# Patient Record
Sex: FEMALE | Race: WHITE
[De-identification: names, ages, dates, MRNs, and addresses within clinical notes are randomized per-mention and may not be internally consistent; named-entity substitution may affect disease eponyms.]

---

## 2018-12-10 ENCOUNTER — HOSPITAL ENCOUNTER (EMERGENCY)
Dept: HOSPITAL 84 - D.ER | Age: 83
Discharge: HOME | End: 2018-12-10
Payer: MEDICARE

## 2018-12-10 DIAGNOSIS — I10: ICD-10-CM

## 2018-12-10 DIAGNOSIS — E11.9: ICD-10-CM

## 2018-12-10 DIAGNOSIS — Z95.0: ICD-10-CM

## 2018-12-10 DIAGNOSIS — M54.5: Primary | ICD-10-CM

## 2018-12-10 DIAGNOSIS — I48.2: ICD-10-CM

## 2018-12-10 DIAGNOSIS — Z79.01: ICD-10-CM

## 2018-12-10 LAB
INR PPP: 1.74 (ref 0.85–1.17)
PROTHROMBIN TIME: 19.8 SECONDS (ref 11.6–15)

## 2018-12-13 ENCOUNTER — HOSPITAL ENCOUNTER (INPATIENT)
Dept: HOSPITAL 84 - D.ER | Age: 83
LOS: 6 days | Discharge: NURSING, REHAB OR LONG-TERM CARE | DRG: 543 | End: 2018-12-19
Attending: FAMILY MEDICINE | Admitting: FAMILY MEDICINE
Payer: MEDICARE

## 2018-12-13 VITALS
HEIGHT: 65 IN | WEIGHT: 126 LBS | HEIGHT: 65 IN | BODY MASS INDEX: 20.99 KG/M2 | BODY MASS INDEX: 20.99 KG/M2 | WEIGHT: 126 LBS | BODY MASS INDEX: 20.99 KG/M2 | BODY MASS INDEX: 20.99 KG/M2

## 2018-12-13 DIAGNOSIS — E11.9: ICD-10-CM

## 2018-12-13 DIAGNOSIS — Z86.73: ICD-10-CM

## 2018-12-13 DIAGNOSIS — I48.91: ICD-10-CM

## 2018-12-13 DIAGNOSIS — E03.9: ICD-10-CM

## 2018-12-13 DIAGNOSIS — D75.89: ICD-10-CM
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
## 2018-12-13 DIAGNOSIS — Z79.01: ICD-10-CM

## 2018-12-13 DIAGNOSIS — N17.9: ICD-10-CM

## 2018-12-13 DIAGNOSIS — I10: ICD-10-CM

## 2018-12-13 DIAGNOSIS — M48.54XA: Primary | ICD-10-CM

## 2018-12-13 DIAGNOSIS — M48.56XA: ICD-10-CM

## 2018-12-13 DIAGNOSIS — E87.5: ICD-10-CM

## 2018-12-14 VITALS — SYSTOLIC BLOOD PRESSURE: 123 MMHG | DIASTOLIC BLOOD PRESSURE: 64 MMHG

## 2018-12-14 VITALS — SYSTOLIC BLOOD PRESSURE: 131 MMHG | DIASTOLIC BLOOD PRESSURE: 41 MMHG

## 2018-12-14 VITALS — DIASTOLIC BLOOD PRESSURE: 65 MMHG | SYSTOLIC BLOOD PRESSURE: 128 MMHG

## 2018-12-14 VITALS — SYSTOLIC BLOOD PRESSURE: 122 MMHG | DIASTOLIC BLOOD PRESSURE: 62 MMHG

## 2018-12-14 VITALS — SYSTOLIC BLOOD PRESSURE: 130 MMHG | DIASTOLIC BLOOD PRESSURE: 61 MMHG

## 2018-12-14 LAB
ALBUMIN SERPL-MCNC: 2.4 G/DL (ref 3.4–5)
ALBUMIN SERPL-MCNC: 2.5 G/DL (ref 3.4–5)
ALP SERPL-CCNC: 216 U/L (ref 46–116)
ALP SERPL-CCNC: 242 U/L (ref 46–116)
ALT SERPL-CCNC: 108 U/L (ref 10–68)
ALT SERPL-CCNC: 96 U/L (ref 10–68)
ANION GAP SERPL CALC-SCNC: 13.8 MMOL/L (ref 8–16)
ANION GAP SERPL CALC-SCNC: 17.9 MMOL/L (ref 8–16)
APTT BLD: 37.8 SECONDS (ref 22.8–39.4)
BASOPHILS NFR BLD AUTO: 0.2 % (ref 0–2)
BASOPHILS NFR BLD AUTO: 0.3 % (ref 0–2)
BILIRUB SERPL-MCNC: 0.88 MG/DL (ref 0.2–1.3)
BILIRUB SERPL-MCNC: 0.95 MG/DL (ref 0.2–1.3)
BUN SERPL-MCNC: 79 MG/DL (ref 7–18)
BUN SERPL-MCNC: 87 MG/DL (ref 7–18)
CALCIUM SERPL-MCNC: 8 MG/DL (ref 8.5–10.1)
CALCIUM SERPL-MCNC: 8.6 MG/DL (ref 8.5–10.1)
CHLORIDE SERPL-SCNC: 103 MMOL/L (ref 98–107)
CHLORIDE SERPL-SCNC: 104 MMOL/L (ref 98–107)
CO2 SERPL-SCNC: 25.4 MMOL/L (ref 21–32)
CO2 SERPL-SCNC: 27.1 MMOL/L (ref 21–32)
CREAT SERPL-MCNC: 2.6 MG/DL (ref 0.6–1.3)
CREAT SERPL-MCNC: 2.7 MG/DL (ref 0.6–1.3)
EOSINOPHIL NFR BLD: 0.5 % (ref 0–7)
EOSINOPHIL NFR BLD: 0.6 % (ref 0–7)
ERYTHROCYTE [DISTWIDTH] IN BLOOD BY AUTOMATED COUNT: 15.3 % (ref 11.5–14.5)
ERYTHROCYTE [DISTWIDTH] IN BLOOD BY AUTOMATED COUNT: 15.5 % (ref 11.5–14.5)
GLOBULIN SER-MCNC: 3.7 G/L
GLOBULIN SER-MCNC: 3.9 G/L
GLUCOSE SERPL-MCNC: 117 MG/DL (ref 74–106)
GLUCOSE SERPL-MCNC: 93 MG/DL (ref 74–106)
HCT VFR BLD CALC: 34.8 % (ref 36–48)
HCT VFR BLD CALC: 34.9 % (ref 36–48)
HGB BLD-MCNC: 10.9 G/DL (ref 12–16)
HGB BLD-MCNC: 11 G/DL (ref 12–16)
IMM GRANULOCYTES NFR BLD: 0.3 % (ref 0–5)
IMM GRANULOCYTES NFR BLD: 0.3 % (ref 0–5)
INR PPP: 2.05 (ref 0.85–1.17)
LYMPHOCYTES NFR BLD AUTO: 10.6 % (ref 15–50)
LYMPHOCYTES NFR BLD AUTO: 8.6 % (ref 15–50)
MAGNESIUM SERPL-MCNC: 2.6 MG/DL (ref 1.8–2.4)
MCH RBC QN AUTO: 31.6 PG (ref 26–34)
MCH RBC QN AUTO: 31.7 PG (ref 26–34)
MCHC RBC AUTO-ENTMCNC: 31.3 G/DL (ref 31–37)
MCHC RBC AUTO-ENTMCNC: 31.5 G/DL (ref 31–37)
MCV RBC: 100.3 FL (ref 80–100)
MCV RBC: 101.2 FL (ref 80–100)
MONOCYTES NFR BLD: 14.7 % (ref 2–11)
MONOCYTES NFR BLD: 16.9 % (ref 2–11)
NEUTROPHILS NFR BLD AUTO: 71.4 % (ref 40–80)
NEUTROPHILS NFR BLD AUTO: 75.6 % (ref 40–80)
OSMOLALITY SERPL CALC.SUM OF ELEC: 302 MOSM/KG (ref 275–300)
OSMOLALITY SERPL CALC.SUM OF ELEC: 306 MOSM/KG (ref 275–300)
PLATELET # BLD: 321 10X3/UL (ref 130–400)
PLATELET # BLD: 329 10X3/UL (ref 130–400)
PMV BLD AUTO: 10.5 FL (ref 7.4–10.4)
PMV BLD AUTO: 10.7 FL (ref 7.4–10.4)
POTASSIUM SERPL-SCNC: 4.9 MMOL/L (ref 3.5–5.1)
POTASSIUM SERPL-SCNC: 6.3 MMOL/L (ref 3.5–5.1)
PROT SERPL-MCNC: 6.1 G/DL (ref 6.4–8.2)
PROT SERPL-MCNC: 6.4 G/DL (ref 6.4–8.2)
PROTHROMBIN TIME: 22.5 SECONDS (ref 11.6–15)
RBC # BLD AUTO: 3.44 10X6/UL (ref 4–5.4)
RBC # BLD AUTO: 3.48 10X6/UL (ref 4–5.4)
SODIUM SERPL-SCNC: 140 MMOL/L (ref 136–145)
SODIUM SERPL-SCNC: 140 MMOL/L (ref 136–145)
WBC # BLD AUTO: 6.6 10X3/UL (ref 4.8–10.8)
WBC # BLD AUTO: 7.9 10X3/UL (ref 4.8–10.8)

## 2018-12-15 VITALS — SYSTOLIC BLOOD PRESSURE: 148 MMHG | DIASTOLIC BLOOD PRESSURE: 61 MMHG

## 2018-12-15 VITALS — DIASTOLIC BLOOD PRESSURE: 65 MMHG | SYSTOLIC BLOOD PRESSURE: 146 MMHG

## 2018-12-15 VITALS — SYSTOLIC BLOOD PRESSURE: 84 MMHG | DIASTOLIC BLOOD PRESSURE: 59 MMHG

## 2018-12-15 VITALS — DIASTOLIC BLOOD PRESSURE: 48 MMHG | SYSTOLIC BLOOD PRESSURE: 107 MMHG

## 2018-12-15 VITALS — SYSTOLIC BLOOD PRESSURE: 103 MMHG | DIASTOLIC BLOOD PRESSURE: 77 MMHG

## 2018-12-15 VITALS — SYSTOLIC BLOOD PRESSURE: 144 MMHG | DIASTOLIC BLOOD PRESSURE: 42 MMHG

## 2018-12-15 LAB
ALBUMIN SERPL-MCNC: 1.9 G/DL (ref 3.4–5)
ALP SERPL-CCNC: 184 U/L (ref 46–116)
ALT SERPL-CCNC: 65 U/L (ref 10–68)
ANION GAP SERPL CALC-SCNC: 16.2 MMOL/L (ref 8–16)
APPEARANCE UR: CLEAR
BASOPHILS NFR BLD AUTO: 0.3 % (ref 0–2)
BILIRUB SERPL-MCNC: 0.51 MG/DL (ref 0.2–1.3)
BILIRUB SERPL-MCNC: NEGATIVE MG/DL
BUN SERPL-MCNC: 80 MG/DL (ref 7–18)
CALCIUM SERPL-MCNC: 7.5 MG/DL (ref 8.5–10.1)
CHLORIDE SERPL-SCNC: 108 MMOL/L (ref 98–107)
CO2 SERPL-SCNC: 22.4 MMOL/L (ref 21–32)
COLOR UR: (no result)
CREAT SERPL-MCNC: 2.6 MG/DL (ref 0.6–1.3)
EOSINOPHIL NFR BLD: 0.3 % (ref 0–7)
ERYTHROCYTE [DISTWIDTH] IN BLOOD BY AUTOMATED COUNT: 15.4 % (ref 11.5–14.5)
GLOBULIN SER-MCNC: 3.6 G/L
GLUCOSE SERPL-MCNC: 101 MG/DL (ref 74–106)
GLUCOSE SERPL-MCNC: NEGATIVE MG/DL
HCT VFR BLD CALC: 32.4 % (ref 36–48)
HGB BLD-MCNC: 9.9 G/DL (ref 12–16)
IMM GRANULOCYTES NFR BLD: 0.4 % (ref 0–5)
INR PPP: 2.11 (ref 0.85–1.17)
KETONES UR STRIP-MCNC: NEGATIVE MG/DL
LYMPHOCYTES NFR BLD AUTO: 8.2 % (ref 15–50)
MAGNESIUM SERPL-MCNC: 2.5 MG/DL (ref 1.8–2.4)
MCH RBC QN AUTO: 31.1 PG (ref 26–34)
MCHC RBC AUTO-ENTMCNC: 30.6 G/DL (ref 31–37)
MCV RBC: 101.9 FL (ref 80–100)
MONOCYTES NFR BLD: 14.6 % (ref 2–11)
NEUTROPHILS NFR BLD AUTO: 76.2 % (ref 40–80)
NITRITE UR-MCNC: NEGATIVE MG/ML
OSMOLALITY SERPL CALC.SUM OF ELEC: 306 MOSM/KG (ref 275–300)
PH UR STRIP: 5 [PH] (ref 5–6)
PLATELET # BLD: 339 10X3/UL (ref 130–400)
PMV BLD AUTO: 10.6 FL (ref 7.4–10.4)
POTASSIUM SERPL-SCNC: 4.6 MMOL/L (ref 3.5–5.1)
PROT SERPL-MCNC: 5.5 G/DL (ref 6.4–8.2)
PROT UR-MCNC: NEGATIVE MG/DL
PROTHROMBIN TIME: 23 SECONDS (ref 11.6–15)
RBC # BLD AUTO: 3.18 10X6/UL (ref 4–5.4)
SODIUM SERPL-SCNC: 142 MMOL/L (ref 136–145)
SP GR UR STRIP: 1.01 (ref 1–1.02)
UROBILINOGEN UR-MCNC: NORMAL MG/DL
WBC # BLD AUTO: 7.2 10X3/UL (ref 4.8–10.8)

## 2018-12-16 VITALS — DIASTOLIC BLOOD PRESSURE: 58 MMHG | SYSTOLIC BLOOD PRESSURE: 142 MMHG

## 2018-12-16 VITALS — DIASTOLIC BLOOD PRESSURE: 47 MMHG | SYSTOLIC BLOOD PRESSURE: 149 MMHG

## 2018-12-16 VITALS — SYSTOLIC BLOOD PRESSURE: 167 MMHG | DIASTOLIC BLOOD PRESSURE: 58 MMHG

## 2018-12-16 VITALS — DIASTOLIC BLOOD PRESSURE: 52 MMHG | SYSTOLIC BLOOD PRESSURE: 157 MMHG

## 2018-12-16 VITALS — DIASTOLIC BLOOD PRESSURE: 53 MMHG | SYSTOLIC BLOOD PRESSURE: 130 MMHG

## 2018-12-16 VITALS — SYSTOLIC BLOOD PRESSURE: 144 MMHG | DIASTOLIC BLOOD PRESSURE: 50 MMHG

## 2018-12-16 LAB
ALBUMIN SERPL-MCNC: 1.7 G/DL (ref 3.4–5)
ALP SERPL-CCNC: 167 U/L (ref 46–116)
ALT SERPL-CCNC: 44 U/L (ref 10–68)
ANION GAP SERPL CALC-SCNC: 19.1 MMOL/L (ref 8–16)
BASOPHILS NFR BLD AUTO: 0.1 % (ref 0–2)
BILIRUB SERPL-MCNC: 0.53 MG/DL (ref 0.2–1.3)
BUN SERPL-MCNC: 71 MG/DL (ref 7–18)
CALCIUM SERPL-MCNC: 7.4 MG/DL (ref 8.5–10.1)
CHLORIDE SERPL-SCNC: 112 MMOL/L (ref 98–107)
CO2 SERPL-SCNC: 21.3 MMOL/L (ref 21–32)
CREAT SERPL-MCNC: 1.9 MG/DL (ref 0.6–1.3)
EOSINOPHIL NFR BLD: 0.3 % (ref 0–7)
ERYTHROCYTE [DISTWIDTH] IN BLOOD BY AUTOMATED COUNT: 15.6 % (ref 11.5–14.5)
GLOBULIN SER-MCNC: 3.7 G/L
GLUCOSE SERPL-MCNC: 96 MG/DL (ref 74–106)
HCT VFR BLD CALC: 31.6 % (ref 36–48)
HGB BLD-MCNC: 9.9 G/DL (ref 12–16)
IMM GRANULOCYTES NFR BLD: 0.8 % (ref 0–5)
INR PPP: 1.61 (ref 0.85–1.17)
LYMPHOCYTES NFR BLD AUTO: 11.2 % (ref 15–50)
MAGNESIUM SERPL-MCNC: 2.4 MG/DL (ref 1.8–2.4)
MCH RBC QN AUTO: 31.5 PG (ref 26–34)
MCHC RBC AUTO-ENTMCNC: 31.3 G/DL (ref 31–37)
MCV RBC: 100.6 FL (ref 80–100)
MONOCYTES NFR BLD: 10.3 % (ref 2–11)
NEUTROPHILS NFR BLD AUTO: 77.3 % (ref 40–80)
OSMOLALITY SERPL CALC.SUM OF ELEC: 316 MOSM/KG (ref 275–300)
PLATELET # BLD: 336 10X3/UL (ref 130–400)
PMV BLD AUTO: 10.1 FL (ref 7.4–10.4)
POTASSIUM SERPL-SCNC: 3.4 MMOL/L (ref 3.5–5.1)
PROT SERPL-MCNC: 5.4 G/DL (ref 6.4–8.2)
PROTHROMBIN TIME: 18.5 SECONDS (ref 11.6–15)
RBC # BLD AUTO: 3.14 10X6/UL (ref 4–5.4)
SODIUM SERPL-SCNC: 149 MMOL/L (ref 136–145)
WBC # BLD AUTO: 7.9 10X3/UL (ref 4.8–10.8)

## 2018-12-17 VITALS — SYSTOLIC BLOOD PRESSURE: 135 MMHG | DIASTOLIC BLOOD PRESSURE: 64 MMHG

## 2018-12-17 VITALS — SYSTOLIC BLOOD PRESSURE: 131 MMHG | DIASTOLIC BLOOD PRESSURE: 68 MMHG

## 2018-12-17 VITALS — DIASTOLIC BLOOD PRESSURE: 72 MMHG | SYSTOLIC BLOOD PRESSURE: 112 MMHG

## 2018-12-17 VITALS — DIASTOLIC BLOOD PRESSURE: 61 MMHG | SYSTOLIC BLOOD PRESSURE: 135 MMHG

## 2018-12-17 VITALS — SYSTOLIC BLOOD PRESSURE: 122 MMHG | DIASTOLIC BLOOD PRESSURE: 66 MMHG

## 2018-12-17 VITALS — SYSTOLIC BLOOD PRESSURE: 129 MMHG | DIASTOLIC BLOOD PRESSURE: 72 MMHG

## 2018-12-17 LAB
ALBUMIN SERPL-MCNC: 1.7 G/DL (ref 3.4–5)
ALP SERPL-CCNC: 146 U/L (ref 46–116)
ALT SERPL-CCNC: 28 U/L (ref 10–68)
ANION GAP SERPL CALC-SCNC: 14.5 MMOL/L (ref 8–16)
BASOPHILS NFR BLD AUTO: 0.1 % (ref 0–2)
BILIRUB SERPL-MCNC: 0.38 MG/DL (ref 0.2–1.3)
BUN SERPL-MCNC: 54 MG/DL (ref 7–18)
CALCIUM SERPL-MCNC: 7.5 MG/DL (ref 8.5–10.1)
CHLORIDE SERPL-SCNC: 115 MMOL/L (ref 98–107)
CO2 SERPL-SCNC: 23 MMOL/L (ref 21–32)
CREAT SERPL-MCNC: 1.5 MG/DL (ref 0.6–1.3)
EOSINOPHIL NFR BLD: 0.4 % (ref 0–7)
ERYTHROCYTE [DISTWIDTH] IN BLOOD BY AUTOMATED COUNT: 15.4 % (ref 11.5–14.5)
GLOBULIN SER-MCNC: 3.5 G/L
GLUCOSE SERPL-MCNC: 106 MG/DL (ref 74–106)
HCT VFR BLD CALC: 31.3 % (ref 36–48)
HGB BLD-MCNC: 10 G/DL (ref 12–16)
IMM GRANULOCYTES NFR BLD: 0.9 % (ref 0–5)
INR PPP: 1.39 (ref 0.85–1.17)
LYMPHOCYTES NFR BLD AUTO: 7.2 % (ref 15–50)
MAGNESIUM SERPL-MCNC: 2.3 MG/DL (ref 1.8–2.4)
MCH RBC QN AUTO: 32.2 PG (ref 26–34)
MCHC RBC AUTO-ENTMCNC: 31.9 G/DL (ref 31–37)
MCV RBC: 100.6 FL (ref 80–100)
MONOCYTES NFR BLD: 7.2 % (ref 2–11)
NEUTROPHILS NFR BLD AUTO: 84.2 % (ref 40–80)
OSMOLALITY SERPL CALC.SUM OF ELEC: 312 MOSM/KG (ref 275–300)
PLATELET # BLD: 317 10X3/UL (ref 130–400)
PMV BLD AUTO: 9.8 FL (ref 7.4–10.4)
POTASSIUM SERPL-SCNC: 2.5 MMOL/L (ref 3.5–5.1)
PROT SERPL-MCNC: 5.2 G/DL (ref 6.4–8.2)
PROTHROMBIN TIME: 16.5 SECONDS (ref 11.6–15)
RBC # BLD AUTO: 3.11 10X6/UL (ref 4–5.4)
SODIUM SERPL-SCNC: 150 MMOL/L (ref 136–145)
WBC # BLD AUTO: 8.1 10X3/UL (ref 4.8–10.8)

## 2018-12-18 VITALS — DIASTOLIC BLOOD PRESSURE: 60 MMHG | SYSTOLIC BLOOD PRESSURE: 158 MMHG

## 2018-12-18 VITALS — SYSTOLIC BLOOD PRESSURE: 118 MMHG | DIASTOLIC BLOOD PRESSURE: 68 MMHG

## 2018-12-18 VITALS — DIASTOLIC BLOOD PRESSURE: 70 MMHG | SYSTOLIC BLOOD PRESSURE: 120 MMHG

## 2018-12-18 VITALS — SYSTOLIC BLOOD PRESSURE: 140 MMHG | DIASTOLIC BLOOD PRESSURE: 58 MMHG

## 2018-12-18 VITALS — DIASTOLIC BLOOD PRESSURE: 66 MMHG | SYSTOLIC BLOOD PRESSURE: 124 MMHG

## 2018-12-18 VITALS — SYSTOLIC BLOOD PRESSURE: 126 MMHG | DIASTOLIC BLOOD PRESSURE: 59 MMHG

## 2018-12-18 LAB
ALBUMIN SERPL-MCNC: 1.7 G/DL (ref 3.4–5)
ALP SERPL-CCNC: 146 U/L (ref 46–116)
ALT SERPL-CCNC: 28 U/L (ref 10–68)
ANION GAP SERPL CALC-SCNC: 14.9 MMOL/L (ref 8–16)
BASOPHILS NFR BLD AUTO: 0.3 % (ref 0–2)
BILIRUB SERPL-MCNC: 0.32 MG/DL (ref 0.2–1.3)
BUN SERPL-MCNC: 44 MG/DL (ref 7–18)
CALCIUM SERPL-MCNC: 8.1 MG/DL (ref 8.5–10.1)
CHLORIDE SERPL-SCNC: 114 MMOL/L (ref 98–107)
CO2 SERPL-SCNC: 23 MMOL/L (ref 21–32)
CREAT SERPL-MCNC: 1.5 MG/DL (ref 0.6–1.3)
EOSINOPHIL NFR BLD: 0.7 % (ref 0–7)
ERYTHROCYTE [DISTWIDTH] IN BLOOD BY AUTOMATED COUNT: 15.6 % (ref 11.5–14.5)
GLOBULIN SER-MCNC: 3.9 G/L
GLUCOSE SERPL-MCNC: 87 MG/DL (ref 74–106)
HCT VFR BLD CALC: 34.5 % (ref 36–48)
HGB BLD-MCNC: 10.8 G/DL (ref 12–16)
IMM GRANULOCYTES NFR BLD: 1.5 % (ref 0–5)
INR PPP: 1.39 (ref 0.85–1.17)
LYMPHOCYTES NFR BLD AUTO: 5.9 % (ref 15–50)
MAGNESIUM SERPL-MCNC: 2.3 MG/DL (ref 1.8–2.4)
MCH RBC QN AUTO: 31.6 PG (ref 26–34)
MCHC RBC AUTO-ENTMCNC: 31.3 G/DL (ref 31–37)
MCV RBC: 100.9 FL (ref 80–100)
MONOCYTES NFR BLD: 8.5 % (ref 2–11)
NEUTROPHILS NFR BLD AUTO: 83.1 % (ref 40–80)
OSMOLALITY SERPL CALC.SUM OF ELEC: 303 MOSM/KG (ref 275–300)
PLATELET # BLD: 400 10X3/UL (ref 130–400)
PMV BLD AUTO: 10.3 FL (ref 7.4–10.4)
POTASSIUM SERPL-SCNC: 3.9 MMOL/L (ref 3.5–5.1)
PROT SERPL-MCNC: 5.6 G/DL (ref 6.4–8.2)
PROTHROMBIN TIME: 16.5 SECONDS (ref 11.6–15)
RBC # BLD AUTO: 3.42 10X6/UL (ref 4–5.4)
SODIUM SERPL-SCNC: 148 MMOL/L (ref 136–145)
WBC # BLD AUTO: 11.4 10X3/UL (ref 4.8–10.8)

## 2018-12-19 ENCOUNTER — HOSPITAL ENCOUNTER (INPATIENT)
Dept: HOSPITAL 84 - D.REHAB | Age: 83
LOS: 14 days | Discharge: ANOTHER HOSPITAL | DRG: 560 | End: 2019-01-02
Attending: FAMILY MEDICINE | Admitting: FAMILY MEDICINE
Payer: MEDICARE

## 2018-12-19 VITALS — SYSTOLIC BLOOD PRESSURE: 135 MMHG | DIASTOLIC BLOOD PRESSURE: 64 MMHG

## 2018-12-19 VITALS — DIASTOLIC BLOOD PRESSURE: 73 MMHG | SYSTOLIC BLOOD PRESSURE: 176 MMHG

## 2018-12-19 VITALS
WEIGHT: 140 LBS | BODY MASS INDEX: 23.32 KG/M2 | HEIGHT: 65 IN | WEIGHT: 140 LBS | BODY MASS INDEX: 23.32 KG/M2 | HEIGHT: 65 IN | BODY MASS INDEX: 23.32 KG/M2

## 2018-12-19 VITALS — SYSTOLIC BLOOD PRESSURE: 175 MMHG | DIASTOLIC BLOOD PRESSURE: 69 MMHG

## 2018-12-19 VITALS — DIASTOLIC BLOOD PRESSURE: 59 MMHG | SYSTOLIC BLOOD PRESSURE: 150 MMHG

## 2018-12-19 VITALS — DIASTOLIC BLOOD PRESSURE: 46 MMHG | SYSTOLIC BLOOD PRESSURE: 143 MMHG

## 2018-12-19 DIAGNOSIS — R13.12: ICD-10-CM

## 2018-12-19 DIAGNOSIS — M54.16: ICD-10-CM

## 2018-12-19 DIAGNOSIS — I10: ICD-10-CM

## 2018-12-19 DIAGNOSIS — I48.2: ICD-10-CM

## 2018-12-19 DIAGNOSIS — F17.200: ICD-10-CM

## 2018-12-19 DIAGNOSIS — N17.9: ICD-10-CM

## 2018-12-19 DIAGNOSIS — E86.0: ICD-10-CM

## 2018-12-19 DIAGNOSIS — S22.089D: Primary | ICD-10-CM

## 2018-12-19 DIAGNOSIS — M54.5: ICD-10-CM

## 2018-12-19 LAB
ALBUMIN SERPL-MCNC: 1.7 G/DL (ref 3.4–5)
ALP SERPL-CCNC: 134 U/L (ref 46–116)
ALT SERPL-CCNC: 20 U/L (ref 10–68)
ANION GAP SERPL CALC-SCNC: 13.8 MMOL/L (ref 8–16)
BASOPHILS NFR BLD AUTO: 0.2 % (ref 0–2)
BILIRUB SERPL-MCNC: 0.35 MG/DL (ref 0.2–1.3)
BUN SERPL-MCNC: 40 MG/DL (ref 7–18)
CALCIUM SERPL-MCNC: 8.3 MG/DL (ref 8.5–10.1)
CHLORIDE SERPL-SCNC: 116 MMOL/L (ref 98–107)
CO2 SERPL-SCNC: 21.4 MMOL/L (ref 21–32)
CREAT SERPL-MCNC: 1.4 MG/DL (ref 0.6–1.3)
EOSINOPHIL NFR BLD: 0.6 % (ref 0–7)
ERYTHROCYTE [DISTWIDTH] IN BLOOD BY AUTOMATED COUNT: 15.3 % (ref 11.5–14.5)
GLOBULIN SER-MCNC: 3.6 G/L
GLUCOSE SERPL-MCNC: 95 MG/DL (ref 74–106)
HCT VFR BLD CALC: 32.2 % (ref 36–48)
HGB BLD-MCNC: 10.1 G/DL (ref 12–16)
IMM GRANULOCYTES NFR BLD: 3.1 % (ref 0–5)
INR PPP: 1.31 (ref 0.85–1.17)
LYMPHOCYTES NFR BLD AUTO: 7.8 % (ref 15–50)
MAGNESIUM SERPL-MCNC: 2.1 MG/DL (ref 1.8–2.4)
MCH RBC QN AUTO: 31.4 PG (ref 26–34)
MCHC RBC AUTO-ENTMCNC: 31.4 G/DL (ref 31–37)
MCV RBC: 100 FL (ref 80–100)
MONOCYTES NFR BLD: 8.2 % (ref 2–11)
NEUTROPHILS NFR BLD AUTO: 80.1 % (ref 40–80)
OSMOLALITY SERPL CALC.SUM OF ELEC: 301 MOSM/KG (ref 275–300)
PLATELET # BLD: 404 10X3/UL (ref 130–400)
PMV BLD AUTO: 10 FL (ref 7.4–10.4)
POTASSIUM SERPL-SCNC: 4.2 MMOL/L (ref 3.5–5.1)
PROT SERPL-MCNC: 5.3 G/DL (ref 6.4–8.2)
PROTHROMBIN TIME: 15.7 SECONDS (ref 11.6–15)
RBC # BLD AUTO: 3.22 10X6/UL (ref 4–5.4)
SODIUM SERPL-SCNC: 147 MMOL/L (ref 136–145)
WBC # BLD AUTO: 14.7 10X3/UL (ref 4.8–10.8)

## 2018-12-20 VITALS — SYSTOLIC BLOOD PRESSURE: 154 MMHG | DIASTOLIC BLOOD PRESSURE: 56 MMHG

## 2018-12-20 VITALS — DIASTOLIC BLOOD PRESSURE: 36 MMHG | SYSTOLIC BLOOD PRESSURE: 123 MMHG

## 2018-12-20 LAB
ANION GAP SERPL CALC-SCNC: 14.1 MMOL/L (ref 8–16)
APPEARANCE UR: CLEAR
BACTERIA #/AREA URNS HPF: (no result) /HPF
BASOPHILS NFR BLD AUTO: 0.2 % (ref 0–2)
BILIRUB SERPL-MCNC: NEGATIVE MG/DL
BUN SERPL-MCNC: 33 MG/DL (ref 7–18)
CALCIUM SERPL-MCNC: 8.4 MG/DL (ref 8.5–10.1)
CHLORIDE SERPL-SCNC: 115 MMOL/L (ref 98–107)
CO2 SERPL-SCNC: 23.2 MMOL/L (ref 21–32)
COLOR UR: YELLOW
CREAT SERPL-MCNC: 1.3 MG/DL (ref 0.6–1.3)
EOSINOPHIL NFR BLD: 1.2 % (ref 0–7)
ERYTHROCYTE [DISTWIDTH] IN BLOOD BY AUTOMATED COUNT: 15.5 % (ref 11.5–14.5)
GLUCOSE SERPL-MCNC: 87 MG/DL (ref 74–106)
GLUCOSE SERPL-MCNC: NEGATIVE MG/DL
GRAN CASTS #/AREA URNS LPF: (no result) /LPF
HCT VFR BLD CALC: 32.1 % (ref 36–48)
HGB BLD-MCNC: 9.9 G/DL (ref 12–16)
HYALINE CASTS #/AREA URNS LPF: (no result) /LPF
IMM GRANULOCYTES NFR BLD: 3.7 % (ref 0–5)
KETONES UR STRIP-MCNC: NEGATIVE MG/DL
LYMPHOCYTES NFR BLD AUTO: 6.8 % (ref 15–50)
MCH RBC QN AUTO: 30.9 PG (ref 26–34)
MCHC RBC AUTO-ENTMCNC: 30.8 G/DL (ref 31–37)
MCV RBC: 100.3 FL (ref 80–100)
MONOCYTES NFR BLD: 6.8 % (ref 2–11)
NEUTROPHILS NFR BLD AUTO: 81.3 % (ref 40–80)
NITRITE UR-MCNC: NEGATIVE MG/ML
OSMOLALITY SERPL CALC.SUM OF ELEC: 299 MOSM/KG (ref 275–300)
PH UR STRIP: 5 [PH] (ref 5–6)
PLATELET # BLD: 391 10X3/UL (ref 130–400)
PMV BLD AUTO: 10.1 FL (ref 7.4–10.4)
POTASSIUM SERPL-SCNC: 4.3 MMOL/L (ref 3.5–5.1)
PROT UR-MCNC: (no result) MG/DL
RBC # BLD AUTO: 3.2 10X6/UL (ref 4–5.4)
RBC #/AREA URNS HPF: (no result) /HPF (ref 0–5)
SODIUM SERPL-SCNC: 148 MMOL/L (ref 136–145)
SP GR UR STRIP: 1.01 (ref 1–1.02)
SQUAMOUS #/AREA URNS HPF: (no result) /HPF (ref 0–5)
UROBILINOGEN UR-MCNC: NORMAL MG/DL
WBC # BLD AUTO: 13.6 10X3/UL (ref 4.8–10.8)
WBC #/AREA URNS HPF: (no result) /HPF (ref 0–5)

## 2018-12-21 VITALS — SYSTOLIC BLOOD PRESSURE: 165 MMHG | DIASTOLIC BLOOD PRESSURE: 55 MMHG

## 2018-12-21 VITALS — SYSTOLIC BLOOD PRESSURE: 137 MMHG | DIASTOLIC BLOOD PRESSURE: 58 MMHG

## 2018-12-21 LAB
ANION GAP SERPL CALC-SCNC: 14.5 MMOL/L (ref 8–16)
BASOPHILS NFR BLD AUTO: 0.2 % (ref 0–2)
BUN SERPL-MCNC: 34 MG/DL (ref 7–18)
CALCIUM SERPL-MCNC: 8.1 MG/DL (ref 8.5–10.1)
CHLORIDE SERPL-SCNC: 112 MMOL/L (ref 98–107)
CO2 SERPL-SCNC: 24.4 MMOL/L (ref 21–32)
CREAT SERPL-MCNC: 1.3 MG/DL (ref 0.6–1.3)
EOSINOPHIL NFR BLD: 1 % (ref 0–7)
ERYTHROCYTE [DISTWIDTH] IN BLOOD BY AUTOMATED COUNT: 15.4 % (ref 11.5–14.5)
GLUCOSE SERPL-MCNC: 88 MG/DL (ref 74–106)
HCT VFR BLD CALC: 30.9 % (ref 36–48)
HGB BLD-MCNC: 9.8 G/DL (ref 12–16)
IMM GRANULOCYTES NFR BLD: 2.6 % (ref 0–5)
INR PPP: 1.19 (ref 0.85–1.17)
LYMPHOCYTES NFR BLD AUTO: 7.5 % (ref 15–50)
MCH RBC QN AUTO: 31.7 PG (ref 26–34)
MCHC RBC AUTO-ENTMCNC: 31.7 G/DL (ref 31–37)
MCV RBC: 100 FL (ref 80–100)
MONOCYTES NFR BLD: 5.9 % (ref 2–11)
NEUTROPHILS NFR BLD AUTO: 82.8 % (ref 40–80)
OSMOLALITY SERPL CALC.SUM OF ELEC: 298 MOSM/KG (ref 275–300)
PLATELET # BLD: 347 10X3/UL (ref 130–400)
PMV BLD AUTO: 9.8 FL (ref 7.4–10.4)
POTASSIUM SERPL-SCNC: 3.9 MMOL/L (ref 3.5–5.1)
PROTHROMBIN TIME: 14.5 SECONDS (ref 11.6–15)
RBC # BLD AUTO: 3.09 10X6/UL (ref 4–5.4)
SODIUM SERPL-SCNC: 147 MMOL/L (ref 136–145)
WBC # BLD AUTO: 11.2 10X3/UL (ref 4.8–10.8)

## 2018-12-22 VITALS — SYSTOLIC BLOOD PRESSURE: 130 MMHG | DIASTOLIC BLOOD PRESSURE: 52 MMHG

## 2018-12-22 VITALS — SYSTOLIC BLOOD PRESSURE: 148 MMHG | DIASTOLIC BLOOD PRESSURE: 51 MMHG

## 2018-12-22 LAB
INR PPP: 1.14 (ref 0.85–1.17)
PROTHROMBIN TIME: 14 SECONDS (ref 11.6–15)

## 2018-12-23 VITALS — DIASTOLIC BLOOD PRESSURE: 46 MMHG | SYSTOLIC BLOOD PRESSURE: 112 MMHG

## 2018-12-23 LAB
INR PPP: 1.36 (ref 0.85–1.17)
PROTHROMBIN TIME: 16.2 SECONDS (ref 11.6–15)

## 2018-12-24 VITALS — DIASTOLIC BLOOD PRESSURE: 55 MMHG | SYSTOLIC BLOOD PRESSURE: 129 MMHG

## 2018-12-24 VITALS — DIASTOLIC BLOOD PRESSURE: 65 MMHG | SYSTOLIC BLOOD PRESSURE: 131 MMHG

## 2018-12-24 VITALS — DIASTOLIC BLOOD PRESSURE: 46 MMHG | SYSTOLIC BLOOD PRESSURE: 122 MMHG

## 2018-12-24 LAB
ANION GAP SERPL CALC-SCNC: 12.2 MMOL/L (ref 8–16)
BASOPHILS NFR BLD AUTO: 0.2 % (ref 0–2)
BUN SERPL-MCNC: 37 MG/DL (ref 7–18)
CALCIUM SERPL-MCNC: 8.2 MG/DL (ref 8.5–10.1)
CHLORIDE SERPL-SCNC: 105 MMOL/L (ref 98–107)
CO2 SERPL-SCNC: 27.5 MMOL/L (ref 21–32)
CREAT SERPL-MCNC: 1.2 MG/DL (ref 0.6–1.3)
EOSINOPHIL NFR BLD: 2.5 % (ref 0–7)
ERYTHROCYTE [DISTWIDTH] IN BLOOD BY AUTOMATED COUNT: 15.1 % (ref 11.5–14.5)
GLUCOSE SERPL-MCNC: 89 MG/DL (ref 74–106)
HCT VFR BLD CALC: 31.1 % (ref 36–48)
HGB BLD-MCNC: 9.6 G/DL (ref 12–16)
IMM GRANULOCYTES NFR BLD: 1.2 % (ref 0–5)
INR PPP: 1.73 (ref 0.85–1.17)
LYMPHOCYTES NFR BLD AUTO: 7.6 % (ref 15–50)
MCH RBC QN AUTO: 30.8 PG (ref 26–34)
MCHC RBC AUTO-ENTMCNC: 30.9 G/DL (ref 31–37)
MCV RBC: 99.7 FL (ref 80–100)
MONOCYTES NFR BLD: 6.7 % (ref 2–11)
NEUTROPHILS NFR BLD AUTO: 81.8 % (ref 40–80)
OSMOLALITY SERPL CALC.SUM OF ELEC: 288 MOSM/KG (ref 275–300)
PLATELET # BLD: 388 10X3/UL (ref 130–400)
PMV BLD AUTO: 10.1 FL (ref 7.4–10.4)
POTASSIUM SERPL-SCNC: 3.7 MMOL/L (ref 3.5–5.1)
PROTHROMBIN TIME: 19.6 SECONDS (ref 11.6–15)
RBC # BLD AUTO: 3.12 10X6/UL (ref 4–5.4)
SODIUM SERPL-SCNC: 141 MMOL/L (ref 136–145)
WBC # BLD AUTO: 13 10X3/UL (ref 4.8–10.8)

## 2018-12-25 VITALS — SYSTOLIC BLOOD PRESSURE: 119 MMHG | DIASTOLIC BLOOD PRESSURE: 35 MMHG

## 2018-12-25 VITALS — SYSTOLIC BLOOD PRESSURE: 159 MMHG | DIASTOLIC BLOOD PRESSURE: 58 MMHG

## 2018-12-25 LAB
INR PPP: 2.19 (ref 0.85–1.17)
PROTHROMBIN TIME: 23.7 SECONDS (ref 11.6–15)

## 2018-12-26 VITALS — SYSTOLIC BLOOD PRESSURE: 152 MMHG | DIASTOLIC BLOOD PRESSURE: 56 MMHG

## 2018-12-26 VITALS — SYSTOLIC BLOOD PRESSURE: 125 MMHG | DIASTOLIC BLOOD PRESSURE: 45 MMHG

## 2018-12-26 LAB
INR PPP: 2.77 (ref 0.85–1.17)
PROTHROMBIN TIME: 28.5 SECONDS (ref 11.6–15)

## 2018-12-27 VITALS — SYSTOLIC BLOOD PRESSURE: 143 MMHG | DIASTOLIC BLOOD PRESSURE: 48 MMHG

## 2018-12-27 VITALS — SYSTOLIC BLOOD PRESSURE: 111 MMHG | DIASTOLIC BLOOD PRESSURE: 48 MMHG

## 2018-12-27 LAB
INR PPP: 3.24 (ref 0.85–1.17)
PROTHROMBIN TIME: 32.3 SECONDS (ref 11.6–15)

## 2018-12-28 VITALS — DIASTOLIC BLOOD PRESSURE: 45 MMHG | SYSTOLIC BLOOD PRESSURE: 121 MMHG

## 2018-12-28 LAB
ANION GAP SERPL CALC-SCNC: 13.6 MMOL/L (ref 8–16)
BASOPHILS NFR BLD AUTO: 0.3 % (ref 0–2)
BUN SERPL-MCNC: 56 MG/DL (ref 7–18)
CALCIUM SERPL-MCNC: 8.3 MG/DL (ref 8.5–10.1)
CHLORIDE SERPL-SCNC: 106 MMOL/L (ref 98–107)
CO2 SERPL-SCNC: 28.8 MMOL/L (ref 21–32)
CREAT SERPL-MCNC: 2.8 MG/DL (ref 0.6–1.3)
EOSINOPHIL NFR BLD: 1.3 % (ref 0–7)
ERYTHROCYTE [DISTWIDTH] IN BLOOD BY AUTOMATED COUNT: 15.7 % (ref 11.5–14.5)
GLUCOSE SERPL-MCNC: 105 MG/DL (ref 74–106)
HCT VFR BLD CALC: 31.6 % (ref 36–48)
HGB BLD-MCNC: 9.8 G/DL (ref 12–16)
IMM GRANULOCYTES NFR BLD: 0.7 % (ref 0–5)
INR PPP: 3.32 (ref 0.85–1.17)
LYMPHOCYTES NFR BLD AUTO: 6.6 % (ref 15–50)
MCH RBC QN AUTO: 31.6 PG (ref 26–34)
MCHC RBC AUTO-ENTMCNC: 31 G/DL (ref 31–37)
MCV RBC: 101.9 FL (ref 80–100)
MONOCYTES NFR BLD: 8.7 % (ref 2–11)
NEUTROPHILS NFR BLD AUTO: 82.4 % (ref 40–80)
OSMOLALITY SERPL CALC.SUM OF ELEC: 300 MOSM/KG (ref 275–300)
PLATELET # BLD: 416 10X3/UL (ref 130–400)
PMV BLD AUTO: 10.3 FL (ref 7.4–10.4)
POTASSIUM SERPL-SCNC: 5.4 MMOL/L (ref 3.5–5.1)
PROTHROMBIN TIME: 32.9 SECONDS (ref 11.6–15)
RBC # BLD AUTO: 3.1 10X6/UL (ref 4–5.4)
SODIUM SERPL-SCNC: 143 MMOL/L (ref 136–145)
WBC # BLD AUTO: 11.6 10X3/UL (ref 4.8–10.8)

## 2018-12-29 VITALS — SYSTOLIC BLOOD PRESSURE: 133 MMHG | DIASTOLIC BLOOD PRESSURE: 55 MMHG

## 2018-12-29 VITALS — SYSTOLIC BLOOD PRESSURE: 113 MMHG | DIASTOLIC BLOOD PRESSURE: 37 MMHG

## 2018-12-29 LAB
INR PPP: 3.41 (ref 0.85–1.17)
PROTHROMBIN TIME: 33.6 SECONDS (ref 11.6–15)

## 2018-12-30 VITALS — DIASTOLIC BLOOD PRESSURE: 62 MMHG | SYSTOLIC BLOOD PRESSURE: 127 MMHG

## 2018-12-30 VITALS — DIASTOLIC BLOOD PRESSURE: 36 MMHG | SYSTOLIC BLOOD PRESSURE: 115 MMHG

## 2018-12-30 LAB
INR PPP: 3.14 (ref 0.85–1.17)
PROTHROMBIN TIME: 31.5 SECONDS (ref 11.6–15)

## 2018-12-31 VITALS — DIASTOLIC BLOOD PRESSURE: 46 MMHG | SYSTOLIC BLOOD PRESSURE: 135 MMHG

## 2018-12-31 LAB
ANION GAP SERPL CALC-SCNC: 13.6 MMOL/L (ref 8–16)
BASOPHILS NFR BLD AUTO: 0.2 % (ref 0–2)
BUN SERPL-MCNC: 57 MG/DL (ref 7–18)
CALCIUM SERPL-MCNC: 8.1 MG/DL (ref 8.5–10.1)
CHLORIDE SERPL-SCNC: 105 MMOL/L (ref 98–107)
CO2 SERPL-SCNC: 29.1 MMOL/L (ref 21–32)
CREAT SERPL-MCNC: 2.7 MG/DL (ref 0.6–1.3)
EOSINOPHIL NFR BLD: 0.6 % (ref 0–7)
ERYTHROCYTE [DISTWIDTH] IN BLOOD BY AUTOMATED COUNT: 16.2 % (ref 11.5–14.5)
GLUCOSE SERPL-MCNC: 85 MG/DL (ref 74–106)
HCT VFR BLD CALC: 31.4 % (ref 36–48)
HGB BLD-MCNC: 9.8 G/DL (ref 12–16)
IMM GRANULOCYTES NFR BLD: 0.3 % (ref 0–5)
INR PPP: 2.88 (ref 0.85–1.17)
LYMPHOCYTES NFR BLD AUTO: 10.5 % (ref 15–50)
MCH RBC QN AUTO: 31.1 PG (ref 26–34)
MCHC RBC AUTO-ENTMCNC: 31.2 G/DL (ref 31–37)
MCV RBC: 99.7 FL (ref 80–100)
MONOCYTES NFR BLD: 9.1 % (ref 2–11)
NEUTROPHILS NFR BLD AUTO: 79.3 % (ref 40–80)
OSMOLALITY SERPL CALC.SUM OF ELEC: 299 MOSM/KG (ref 275–300)
PLATELET # BLD: 439 10X3/UL (ref 130–400)
PMV BLD AUTO: 10.2 FL (ref 7.4–10.4)
POTASSIUM SERPL-SCNC: 4.7 MMOL/L (ref 3.5–5.1)
PROTHROMBIN TIME: 29.4 SECONDS (ref 11.6–15)
RBC # BLD AUTO: 3.15 10X6/UL (ref 4–5.4)
SODIUM SERPL-SCNC: 143 MMOL/L (ref 136–145)
WBC # BLD AUTO: 10.4 10X3/UL (ref 4.8–10.8)

## 2019-01-01 VITALS — DIASTOLIC BLOOD PRESSURE: 33 MMHG | SYSTOLIC BLOOD PRESSURE: 109 MMHG

## 2019-01-01 VITALS — DIASTOLIC BLOOD PRESSURE: 35 MMHG | SYSTOLIC BLOOD PRESSURE: 103 MMHG

## 2019-01-01 LAB
APPEARANCE UR: (no result)
BACTERIA #/AREA URNS HPF: (no result) /HPF
BILIRUB SERPL-MCNC: NEGATIVE MG/DL
COLOR UR: YELLOW
GLUCOSE SERPL-MCNC: NEGATIVE MG/DL
KETONES UR STRIP-MCNC: NEGATIVE MG/DL
NITRITE UR-MCNC: NEGATIVE MG/ML
PH UR STRIP: 6 [PH] (ref 5–6)
PROT UR-MCNC: (no result) MG/DL
RBC #/AREA URNS HPF: (no result) /HPF (ref 0–5)
SP GR UR STRIP: 1 (ref 1–1.02)
SQUAMOUS #/AREA URNS HPF: (no result) /HPF (ref 0–5)
UROBILINOGEN UR-MCNC: NORMAL MG/DL

## 2019-01-02 ENCOUNTER — HOSPITAL ENCOUNTER (INPATIENT)
Dept: HOSPITAL 84 - D.M2 | Age: 84
LOS: 22 days | Discharge: HOME HEALTH | DRG: 981 | End: 2019-01-24
Attending: FAMILY MEDICINE | Admitting: FAMILY MEDICINE
Payer: MEDICARE

## 2019-01-02 VITALS
BODY MASS INDEX: 24.01 KG/M2 | WEIGHT: 144.1 LBS | BODY MASS INDEX: 24.01 KG/M2 | BODY MASS INDEX: 24.01 KG/M2 | HEIGHT: 65 IN | WEIGHT: 144.1 LBS | HEIGHT: 65 IN | BODY MASS INDEX: 24.01 KG/M2

## 2019-01-02 VITALS — DIASTOLIC BLOOD PRESSURE: 38 MMHG | SYSTOLIC BLOOD PRESSURE: 111 MMHG

## 2019-01-02 VITALS — DIASTOLIC BLOOD PRESSURE: 46 MMHG | SYSTOLIC BLOOD PRESSURE: 123 MMHG

## 2019-01-02 VITALS — DIASTOLIC BLOOD PRESSURE: 38 MMHG | SYSTOLIC BLOOD PRESSURE: 120 MMHG

## 2019-01-02 DIAGNOSIS — E87.5: ICD-10-CM

## 2019-01-02 DIAGNOSIS — F03.90: ICD-10-CM

## 2019-01-02 DIAGNOSIS — E78.5: ICD-10-CM

## 2019-01-02 DIAGNOSIS — E43: ICD-10-CM

## 2019-01-02 DIAGNOSIS — E03.9: ICD-10-CM

## 2019-01-02 DIAGNOSIS — D75.89: ICD-10-CM

## 2019-01-02 DIAGNOSIS — Z95.0: ICD-10-CM

## 2019-01-02 DIAGNOSIS — R62.7: ICD-10-CM

## 2019-01-02 DIAGNOSIS — N18.9: ICD-10-CM

## 2019-01-02 DIAGNOSIS — Z79.01: ICD-10-CM

## 2019-01-02 DIAGNOSIS — M48.55XD: ICD-10-CM

## 2019-01-02 DIAGNOSIS — N39.0: ICD-10-CM

## 2019-01-02 DIAGNOSIS — Z86.73: ICD-10-CM

## 2019-01-02 DIAGNOSIS — B95.2: ICD-10-CM

## 2019-01-02 DIAGNOSIS — G93.41: ICD-10-CM

## 2019-01-02 DIAGNOSIS — N17.9: Primary | ICD-10-CM

## 2019-01-02 DIAGNOSIS — E11.22: ICD-10-CM

## 2019-01-02 DIAGNOSIS — A04.72: ICD-10-CM

## 2019-01-02 DIAGNOSIS — I12.9: ICD-10-CM

## 2019-01-02 DIAGNOSIS — B37.81: ICD-10-CM

## 2019-01-02 DIAGNOSIS — I48.91: ICD-10-CM

## 2019-01-02 LAB
ALBUMIN SERPL-MCNC: 2.2 G/DL (ref 3.4–5)
ALP SERPL-CCNC: 116 U/L (ref 46–116)
ALT SERPL-CCNC: 15 U/L (ref 10–68)
ANION GAP SERPL CALC-SCNC: 17.8 MMOL/L (ref 8–16)
ANION GAP SERPL CALC-SCNC: 18.2 MMOL/L (ref 8–16)
BASOPHILS NFR BLD AUTO: 0.1 % (ref 0–2)
BASOPHILS NFR BLD AUTO: 0.2 % (ref 0–2)
BILIRUB SERPL-MCNC: 0.3 MG/DL (ref 0.2–1.3)
BUN SERPL-MCNC: 76 MG/DL (ref 7–18)
BUN SERPL-MCNC: 80 MG/DL (ref 7–18)
CALCIUM SERPL-MCNC: 8 MG/DL (ref 8.5–10.1)
CALCIUM SERPL-MCNC: 8.1 MG/DL (ref 8.5–10.1)
CHLORIDE SERPL-SCNC: 104 MMOL/L (ref 98–107)
CHLORIDE SERPL-SCNC: 106 MMOL/L (ref 98–107)
CO2 SERPL-SCNC: 26 MMOL/L (ref 21–32)
CO2 SERPL-SCNC: 26.5 MMOL/L (ref 21–32)
CREAT SERPL-MCNC: 4.1 MG/DL (ref 0.6–1.3)
CREAT SERPL-MCNC: 4.2 MG/DL (ref 0.6–1.3)
EOSINOPHIL NFR BLD: 0.1 % (ref 0–7)
EOSINOPHIL NFR BLD: 0.3 % (ref 0–7)
ERYTHROCYTE [DISTWIDTH] IN BLOOD BY AUTOMATED COUNT: 16.3 % (ref 11.5–14.5)
ERYTHROCYTE [DISTWIDTH] IN BLOOD BY AUTOMATED COUNT: 16.4 % (ref 11.5–14.5)
ERYTHROCYTE [SEDIMENTATION RATE] IN BLOOD: 10 MM/HR (ref 0–42)
GLOBULIN SER-MCNC: 3.2 G/L
GLUCOSE SERPL-MCNC: 104 MG/DL (ref 74–106)
GLUCOSE SERPL-MCNC: 84 MG/DL (ref 74–106)
HCT VFR BLD CALC: 31.2 % (ref 36–48)
HCT VFR BLD CALC: 33.8 % (ref 36–48)
HGB BLD-MCNC: 10.5 G/DL (ref 12–16)
HGB BLD-MCNC: 9.8 G/DL (ref 12–16)
IMM GRANULOCYTES NFR BLD: 0.3 % (ref 0–5)
IMM GRANULOCYTES NFR BLD: 0.6 % (ref 0–5)
INR PPP: 3.57 (ref 0.85–1.17)
LYMPHOCYTES NFR BLD AUTO: 12.8 % (ref 15–50)
LYMPHOCYTES NFR BLD AUTO: 9.6 % (ref 15–50)
MCH RBC QN AUTO: 31.4 PG (ref 26–34)
MCH RBC QN AUTO: 31.4 PG (ref 26–34)
MCHC RBC AUTO-ENTMCNC: 31.1 G/DL (ref 31–37)
MCHC RBC AUTO-ENTMCNC: 31.4 G/DL (ref 31–37)
MCV RBC: 100 FL (ref 80–100)
MCV RBC: 101.2 FL (ref 80–100)
MONOCYTES NFR BLD: 8.9 % (ref 2–11)
MONOCYTES NFR BLD: 8.9 % (ref 2–11)
NEUTROPHILS NFR BLD AUTO: 77.2 % (ref 40–80)
NEUTROPHILS NFR BLD AUTO: 81 % (ref 40–80)
OSMOLALITY SERPL CALC.SUM OF ELEC: 308 MOSM/KG (ref 275–300)
OSMOLALITY SERPL CALC.SUM OF ELEC: 310 MOSM/KG (ref 275–300)
PLATELET # BLD: 466 10X3/UL (ref 130–400)
PLATELET # BLD: 470 10X3/UL (ref 130–400)
PMV BLD AUTO: 10.1 FL (ref 7.4–10.4)
PMV BLD AUTO: 10.2 FL (ref 7.4–10.4)
POTASSIUM SERPL-SCNC: 5.2 MMOL/L (ref 3.5–5.1)
POTASSIUM SERPL-SCNC: 5.3 MMOL/L (ref 3.5–5.1)
PROT SERPL-MCNC: 5.4 G/DL (ref 6.4–8.2)
PROTHROMBIN TIME: 34.9 SECONDS (ref 11.6–15)
RBC # BLD AUTO: 3.12 10X6/UL (ref 4–5.4)
RBC # BLD AUTO: 3.34 10X6/UL (ref 4–5.4)
SODIUM SERPL-SCNC: 143 MMOL/L (ref 136–145)
SODIUM SERPL-SCNC: 145 MMOL/L (ref 136–145)
TSH SERPL-ACNC: 4.09 UIU/ML (ref 0.36–3.74)
WBC # BLD AUTO: 11 10X3/UL (ref 4.8–10.8)
WBC # BLD AUTO: 11.7 10X3/UL (ref 4.8–10.8)

## 2019-01-02 NOTE — NUR
RESUME CARE. PT IN BED A&O NO C/O OF PAIN/ OR DISTRESS AT THIS TIME CALL LIGHT
IN REACH WILL CONT TO GARY

## 2019-01-02 NOTE — NUR
DR RESTREPO IN ROOM. NEW ORDERS RECIEVED FOR NEW IV WITH NS TO INFUSE. ATTEMPT
AT IV PLACEMENT UNSUCCESFUL. REPORT GIVEN TO NIGHT NURSE.

## 2019-01-02 NOTE — NUR
PATIENT TO UNIT FROM REHAB UNIT. PATIENT IS AWAKE AND ALERT. ORIENTED X 4.
SPEECH IS CLEAR. DEMONSTRATES RT SIDE GAZE AND LT SIDE NEGLECT.EQUAL STRENGTH
TO UPPER AND LOWER EXTREMITIES. PACEMAKER IMPLANT INTACT. HEART RRR. LUNGS
CLEAR JOSH. VITAL SIGNS ARE GOOD. PATIENT DENIES ANY NEEDS OR PAIN. NO FAMILY
AT BEDSIDE. WILL CONTINUE WITH PLAN OF CARE.

## 2019-01-02 NOTE — HEMODYNAMI
PATIENT:CHANTAL TIPTON                                     MEDICAL RECORD: [id number]
: [birth year]                                            LOCATION:[facility]     
ACCT# [id number]                                       ADMISSION DATE: 19
 
 
 Generatedon:201916:16
Patient name: CHANTAL TIPTON Patient #: [id number] Visit #: [id number] SSN: :
 1931
Date of study: 2019
Page: Of
Hemodynamic Procedure Report
****************************
Patient Data
Patient Demographics
Procedure consent was obtained
First Name: CHANTAL           Gender: Female
Last Name: SAEED           : 1931
Patient #: [id number]       Age: 88 year(s)
Visit #: [id number]       Race: Unknown
Accession #:
[id number]
Additional ID: [id number]
Contact details
Address: [address] [place] [address]  Phone: [phone]
State: [place]
City: [place]
Zip code: [address]
Past Medical History
Allergies
Allergen        Reaction        Date         Comments
Reported
Penicillins                     2019
Admission
Admission Data
Admission Date: 2019    Admission Time: 14:45
Room #: [facility]
Height (in.): 65            BSA: 1.76 (m2)
Height (cm.): 165.1         BMI: 25.13 (kg/m2)
Weight (lbs.): 151
Weight (kg.): 68.49
Procedure
Procedure Types
Cath Procedure
Peripheral Cath Diagnostic Procedure
Cath Lab Peripheral Procedures
Kyphoplasty
Kyphoplasty Thoracic
Procedure Description
Procedure Date
Procedure Date: 2019
Procedure Start Time: 15:29
Procedure Staff
Name                            Function
Macho Telles MD                  Performing Physician
Delores Edmondson RT                  Circulator
Marli Espinosa CRNA                Additional personnel
Kathy Bales RN             Nurse
Daniela Allan RN                Nurse
Davon Cohen RT              Scrub
 
Procedure Data
Cath Procedure
Fluoroscopy
Diagnostic fluoroscopy      Total fluoroscopy Time: 9.1
time: 9.1 min               min
Diagnostic fluoroscopy      Total fluoroscopy dose: 156
dose: 156 mGy               mGy
Procedure Medications
Medication           Administration Route Dosage
Oxygen               etCO2 Nasal cannula  4 l/min
Vancomycin           I.V.P.B              500 mg
Lidocaine 1%         added to field       20
Heparin Flush Bag    added to field       1 bags
(1000units/500ml NS)
Hemodynamics
Rest
BSA: 1.76 (m2) O2 Consumption: Estimated: 167.61 (ml/min) O2 Consumption indexed
:
Estimated:95.23 (ml/min/m) Heart Rate: 88 (bpm)
Snapshots
Pre Cath      Intra         NCS           Post Cath
Vital Signs
Time     Heart  Resp   SPO2 etCO2   NIBP       Rhythm  Pain    Sedation
Rate   (ipm)  (%)  (mmHg)  (mmHg)             Status  Level
(bpm)
15:21:09 89     17     100  24      88/62(79)  A-Fib   0 (11)  10(A)
, No
pain
15:25:24 80     12     100  21      96/53(63)  A-Fib   0 (11)  10(A)
, No
pain
15:29:42 86     12     100  27      93/46(74)  A-Fib   0 (11)  10(A)
, No
pain
15:33:56 88     11     100  33      89/55(73)  A-Fib   0 (11)  7(A)
, No
pain
15:38:10 92     11     100  27      99/50(58)  NSR     0 (11)  7(A)
, No
pain
15:42:26 82     11     99   31.5    82/54(70)  NSR     0 (11)  7(A)
, No
pain
15:47:25 86     23     100  36      Measuring  NSR     0 (11)  7(A)
, No
pain
15:47:33 97     23     100  21.7    74/37(40)  NSR     0 (11)  7(A)
, No
pain
15:49:17 84     11     99   21      92/59(76)  NSR     0 (11)  7(A)
, No
pain
15:53:31 80     10     99   35.2    88/54(72)  NSR     0 (11)  7(A)
, No
pain
15:57:43 90     10     99   21      98/51(73)  NSR     0 (11)  7(A)
, No
pain
16:01:57 94     11     99   15.7    92/49(79)  NSR     0 (11)  7(A)
, No
 
pain
16:06:11 82     13     98   13.5    93/56(71)  NSR     0 (11)  7(A)
, No
pain
16:10:19 87     17     99   24      101/71(89) NSR     0 (11)  7(A)
, No
pain
16:15:02                    25.5    No Cuff    NSR     0 (11)  7(A)
, No
pain
Medications
Time     Medication       Route   Dose  Verified Delivered Reason    Notes  Effe
ctiveness
by       by
15:30:34 Oxygen           etCO2   4     Macho Chavez for
Nasal   l/min Nii Telles RN sedation
cannula       MD
15:31:02 Vancomycin       I.V.P.B 500   Macho Chavez used for
mg    Nii Telles RN procedure
MD
15:31:21 Lidocaine 1%     added   20ml  Macho Pritchard      used for
to      vial  Elfego Telles MD procedure
field         MD
15:31:52 Heparin Flush    added   1     Macho Pritchard      used for
Bag              to      bags  Elfego Telles MD procedure
(1000units/500ml field         MD
NS)
Procedure Log
Time     Note
14:53:48 Patient Weight : 151 lbs
14:53:53 Patient Height : 65 inches
14:54:26 Time tracking: Regular hours (M-F 7:00 - 5:00)
14:54:57 Use device set IR Diagnostic
14:54:58 Tegaderm 4 x 4 (1626W) opened to sterile field.
14:54:59 Sterile Angiographic Pack opened to sterile field.
14:55:00 Bag Decanter (2002S) opened to sterile field.
14:55:16 Plan of Care:Hemodynamics will remain stable., Cardiac rhythm will
remain stable., Comfort level will be maintained., Respiratory function
will remain adequate., Patient/ family verbilizes understanding of
procedure., Procedure tolerated without complication., Recovers from
procedure without complications..
14:55:30 Patient received from Med II to IR Alert and oriented. Tansferred to
table in Prone position.
14:55:43 Signed procedure consent form obtained from patient.
14:55:49 H&P Date Dictated: 2019 Within 30 days and on chart..
14:55:52 Pre-procedure instructions explained to patient.
14:55:53 Pre-op teaching completed and patient verbalized understanding.
14:55:56 Family in waiting room.
14:55:58 Patient NPO since Midnight.
14:56:14 Patient allergic to Penicillins
14:56:22 -----------------------------------------------------------------------
-
14:56:25 ----Pre-sedation anethsthesia assessment.----see anesthesia notes for
monitoring of patient during procedure
14:57:20 IV patent on arrival in Rt subclavian with D5/.45%NaCl at KVO.
14:57:37 Thoracic area was prepped with dura-prep and draped in sterile fashion
14:57:46 -----------------------------------------------------------------------
-
14:59:30 [place] AUTOPLEX MIXER W/VHV opened to sterile field.
14:59:31 DIANA BNCMNT CURV BALLOON [id number] opened to sterile field.
 
14:59:32 [place] BONE BX 11GA kit opened to sterile field.
15:19:48 ECG and BP/O2 sat monitors applied to patient.
15:19:49 Vital chart was started
15:19:52 Baseline sample Acquired.
15:19:56 Full Disclosure recording started
15:19:57 -----------------------------------------------------------------------
-
15:27:03 Physician arrived
15:27:05 --------ALL STOP TIME OUT------
15:27:07 Final Timeout: patient, procedure, and site verified with staff and
physician. All members of the team are in agreement.
15:27:17 Thoracic site verified by team.
15:27:35 Sedation plan: TIVA Medication:Propofol
15:28:24 Procedure started.
15:29:18 Local anesthetic to Thoracic area with Lidocaine 1% by Macho Telles MD.**INITIAL ACCESS ONLY**
15:30:34 Oxygen 4 l/min etCO2 Nasal cannula was administered by Kathy Bales RN; for sedation;
15:31:02 Vancomycin 500 mg I.V.P.B was administered by Kathy Nii RN; used
for procedure;
15:31:21 Lidocaine 1% 20ml vial added to field was administered by Macho Telles MD; used for procedure;
15:31:52 Heparin Flush Bag (1000units/500ml NS) 1 bags added to field was
administered by Macho Telles MD; used for procedure;
15:33:56 Jamshidi needle introduced.
15:41:16 Bone bx needle placed.
15:41:37 Kyphoplasty balloon introduced.
16:00:14 Cement introduced to vertebral body.
16:05:52 Jamshidi needle removed.
16:10:09 Procedure ended.(Physican Out)
16:10:24 Fluoroscopy time 09.10 minutes.
16:10:35 Fluoroscopy dose: 156 mGy
16:10:35 Flurop Dose total: 156
16:10:42 Procedure and supply charges have been captured, reviewed, submitted an
d
are correct.
16:15:48 Report given to Outpatients.
16:16:15 Patient transfered to [facility] with Bed.
16:16:37 Vital chart was stopped
Device Usage
Item Name    Manufacture  Quantity  Catalog       Hospital Part    Current Minim
al Lot# /
Number        Charge   Number  Stock   Stock   Serial#
Code
Tegaderm 4 x 3M           1         1626W         [id number]   [id number]  [id number]  5
4 (1626W)
Sterile      Cardinal     1         [id number]    [id number]           [id number]  5
[employer]
Pack
Bag Decanter [employer]     1         S         [id number]   [id number]   [id number]  5
(S)      [employer]
DIANA      [place]      1         [phone]  [id number]   [id number]   [id number]  5
AUTOPLEX
MIXER W/VHV
DIANA      [place]      1         [phone]  198486   [id number]  84686   9
BNCMNT CURV
BALLOON
[id number]
[place] BONE [place]      1         [id number]     [id number]   [id number]  [id number]  5
BX 11GA kit
 
Signature Audit [place]
Stage           Time        Signature      Unsigned
Intra-Procedure 2019   Delores Edmondson
4:16:34 PM  RT(R)
 
 
 
 
 
 
 
 
 
 
 
 
 
 
 
 
 
 
 
 
 
 
 
 
 
 
 
 
 
 
 
 
 
 
 
 
 
 
 
 
 
 
 
 
 
 
 
 
 
 
 
[facility]                                          
[address] [place] [address]

## 2019-01-03 VITALS — SYSTOLIC BLOOD PRESSURE: 123 MMHG | DIASTOLIC BLOOD PRESSURE: 44 MMHG

## 2019-01-03 VITALS — SYSTOLIC BLOOD PRESSURE: 139 MMHG | DIASTOLIC BLOOD PRESSURE: 50 MMHG

## 2019-01-03 VITALS — DIASTOLIC BLOOD PRESSURE: 53 MMHG | SYSTOLIC BLOOD PRESSURE: 118 MMHG

## 2019-01-03 VITALS — SYSTOLIC BLOOD PRESSURE: 112 MMHG | DIASTOLIC BLOOD PRESSURE: 50 MMHG

## 2019-01-03 VITALS — DIASTOLIC BLOOD PRESSURE: 49 MMHG | SYSTOLIC BLOOD PRESSURE: 121 MMHG

## 2019-01-03 LAB
ANION GAP SERPL CALC-SCNC: 16 MMOL/L (ref 8–16)
APPEARANCE UR: (no result)
BACTERIA #/AREA URNS HPF: (no result) /HPF
BASOPHILS NFR BLD AUTO: 0.1 % (ref 0–2)
BILIRUB SERPL-MCNC: NEGATIVE MG/DL
BUN SERPL-MCNC: 78 MG/DL (ref 7–18)
CALCIUM SERPL-MCNC: 8.1 MG/DL (ref 8.5–10.1)
CHLORIDE SERPL-SCNC: 108 MMOL/L (ref 98–107)
CO2 SERPL-SCNC: 24.9 MMOL/L (ref 21–32)
COLOR UR: YELLOW
CREAT SERPL-MCNC: 3.9 MG/DL (ref 0.6–1.3)
EOSINOPHIL NFR BLD: 0.3 % (ref 0–7)
ERYTHROCYTE [DISTWIDTH] IN BLOOD BY AUTOMATED COUNT: 16.3 % (ref 11.5–14.5)
GLUCOSE SERPL-MCNC: 97 MG/DL (ref 74–106)
GLUCOSE SERPL-MCNC: NEGATIVE MG/DL
HCT VFR BLD CALC: 30.9 % (ref 36–48)
HGB BLD-MCNC: 9.8 G/DL (ref 12–16)
IMM GRANULOCYTES NFR BLD: 0.3 % (ref 0–5)
INR PPP: 3.52 (ref 0.85–1.17)
KETONES UR STRIP-MCNC: NEGATIVE MG/DL
LYMPHOCYTES NFR BLD AUTO: 10.9 % (ref 15–50)
MCH RBC QN AUTO: 31.5 PG (ref 26–34)
MCHC RBC AUTO-ENTMCNC: 31.7 G/DL (ref 31–37)
MCV RBC: 99.4 FL (ref 80–100)
MONOCYTES NFR BLD: 10.8 % (ref 2–11)
MUCOUS THREADS #/AREA URNS LPF: (no result) /LPF
NEUTROPHILS NFR BLD AUTO: 77.6 % (ref 40–80)
NITRITE UR-MCNC: POSITIVE MG/ML
OSMOLALITY SERPL CALC.SUM OF ELEC: 309 MOSM/KG (ref 275–300)
PH UR STRIP: 6 [PH] (ref 5–6)
PLATELET # BLD: 420 10X3/UL (ref 130–400)
PMV BLD AUTO: 9.9 FL (ref 7.4–10.4)
POTASSIUM SERPL-SCNC: 4.9 MMOL/L (ref 3.5–5.1)
PROT UR-MCNC: (no result) MG/DL
PROTHROMBIN TIME: 34.5 SECONDS (ref 11.6–15)
RBC # BLD AUTO: 3.11 10X6/UL (ref 4–5.4)
SODIUM SERPL-SCNC: 144 MMOL/L (ref 136–145)
SP GR UR STRIP: 1.01 (ref 1–1.02)
SQUAMOUS #/AREA URNS HPF: (no result) /HPF (ref 0–5)
UROBILINOGEN UR-MCNC: NORMAL MG/DL
WBC # BLD AUTO: 9.3 10X3/UL (ref 4.8–10.8)

## 2019-01-03 NOTE — NUR
EOSINOPHIL URINE ORDERED. IN AND OUT DONE EARLIER TODAY FOR US. SPOKE WITH LAB
AND THEY STATED THEY CAN USE THE SAME URINE FOR THE EOSINOPHIL URINE ORDERED.

## 2019-01-03 NOTE — NUR
RESUME CARE. PT IN BED ALERT BREATHS SOUNDS EVEN NO C/O OF PAIN OR DISTRESS
AT THIS TIME CALL LIGHT IN REACH WILL CONT MONTIOR

## 2019-01-03 NOTE — NUR
PT NEEDS ASSISTANCE WITH MEALS. CNA ASSISTED PT WITH FEEDING AND PT ATE TWO
BITES OF EGGS AND STATED TO HER "I DON'T WANT ANYMORE BECAUSE I'M GONNA BE
SICK."

## 2019-01-03 NOTE — NUR
LAYLA BERMUDEZ AND PT'S WIFE IS PATIENTS POA AND WANTS TO DISCUSS DISCHARGE
PLANNING WITH PERSON NEED BE HIS PHONE NUMBER [phone] HIS WIFE IS
AMARI BERMUDEZ AND HER NUMBER [phone].

## 2019-01-03 NOTE — NUR
PT'S FAMILY AT BEDSIDE THEY STATE THEY ARE IN TOWN FROM [facility]. QUESTIONS
ANSWERED. BED LOW. CL IN REACH.

## 2019-01-03 NOTE — NUR
PER TALHA KUHN SHE GAVE ME V.O. TO IN AND OUT CATH PT TO COLLECT UA. IN AND OUT
CATH DONE AND UA COLLECTED. PT TOLERATED WELL.

## 2019-01-04 VITALS — DIASTOLIC BLOOD PRESSURE: 64 MMHG | SYSTOLIC BLOOD PRESSURE: 149 MMHG

## 2019-01-04 VITALS — DIASTOLIC BLOOD PRESSURE: 54 MMHG | SYSTOLIC BLOOD PRESSURE: 100 MMHG

## 2019-01-04 VITALS — DIASTOLIC BLOOD PRESSURE: 86 MMHG | SYSTOLIC BLOOD PRESSURE: 138 MMHG

## 2019-01-04 VITALS — SYSTOLIC BLOOD PRESSURE: 145 MMHG | DIASTOLIC BLOOD PRESSURE: 73 MMHG

## 2019-01-04 VITALS — DIASTOLIC BLOOD PRESSURE: 57 MMHG | SYSTOLIC BLOOD PRESSURE: 148 MMHG

## 2019-01-04 LAB
ALBUMIN SERPL-MCNC: 2.1 G/DL (ref 3.4–5)
ALP SERPL-CCNC: 101 U/L (ref 46–116)
ALT SERPL-CCNC: 14 U/L (ref 10–68)
ANION GAP SERPL CALC-SCNC: 13.4 MMOL/L (ref 8–16)
BASOPHILS NFR BLD AUTO: 0.2 % (ref 0–2)
BILIRUB SERPL-MCNC: 0.35 MG/DL (ref 0.2–1.3)
BUN SERPL-MCNC: 60 MG/DL (ref 7–18)
CALCIUM SERPL-MCNC: 8.2 MG/DL (ref 8.5–10.1)
CHLORIDE SERPL-SCNC: 111 MMOL/L (ref 98–107)
CO2 SERPL-SCNC: 25.1 MMOL/L (ref 21–32)
CREAT SERPL-MCNC: 2.8 MG/DL (ref 0.6–1.3)
EOSINOPHIL NFR BLD: 1.1 % (ref 0–7)
ERYTHROCYTE [DISTWIDTH] IN BLOOD BY AUTOMATED COUNT: 16.2 % (ref 11.5–14.5)
GLOBULIN SER-MCNC: 3.6 G/L
GLUCOSE SERPL-MCNC: 114 MG/DL (ref 74–106)
HCT VFR BLD CALC: 31.1 % (ref 36–48)
HGB BLD-MCNC: 9.8 G/DL (ref 12–16)
IMM GRANULOCYTES NFR BLD: 0.7 % (ref 0–5)
INR PPP: 3 (ref 0.85–1.17)
LYMPHOCYTES NFR BLD AUTO: 15.6 % (ref 15–50)
MAGNESIUM SERPL-MCNC: 1.8 MG/DL (ref 1.8–2.4)
MCH RBC QN AUTO: 31.6 PG (ref 26–34)
MCHC RBC AUTO-ENTMCNC: 31.5 G/DL (ref 31–37)
MCV RBC: 100.3 FL (ref 80–100)
MONOCYTES NFR BLD: 11 % (ref 2–11)
NEUTROPHILS NFR BLD AUTO: 71.4 % (ref 40–80)
OSMOLALITY SERPL CALC.SUM OF ELEC: 306 MOSM/KG (ref 275–300)
PHOSPHATE SERPL-MCNC: 2.7 MG/DL (ref 2.5–4.9)
PLATELET # BLD: 404 10X3/UL (ref 130–400)
PMV BLD AUTO: 9.6 FL (ref 7.4–10.4)
POTASSIUM SERPL-SCNC: 4.5 MMOL/L (ref 3.5–5.1)
PROT SERPL-MCNC: 5.7 G/DL (ref 6.4–8.2)
PROTHROMBIN TIME: 30.4 SECONDS (ref 11.6–15)
RBC # BLD AUTO: 3.1 10X6/UL (ref 4–5.4)
SODIUM SERPL-SCNC: 145 MMOL/L (ref 136–145)
WBC # BLD AUTO: 8.3 10X3/UL (ref 4.8–10.8)

## 2019-01-04 NOTE — MORECARE
CASE MANAGEMENT DISCHARGE SUMMARY
 
 
PATIENT: CHANTAL TIPTON                        UNIT: [id number]
ACCOUNT#: [id number]                       ADM DATE: 19
AGE: 87     : [birth year]  SEX: F            ROOM/BED: D.2112    
AUTHOR: CHELE RAMON                             PHYSICIAN:                               
 
REFERRING PHYSICIAN: SHARATH TORRES MD               
DATE OF SERVICE: 19
Discharge Plan
 
 
Patient Name: CHANTAL TIPTON
[facility]
Encounter #: [id number]
Medical Record #: [id number]
: 1931
Planned Disposition: Skilled Nursing Facility
Anticipated Discharge Date: 
 
Discharge Date: 
Expected LOS: 
Initial Reviewer: [email or web address]
Initial Review Date: 2019
Generated: 19   6:46 pm 
 DCPIA - Discharge Planning Initial Assessment
 
Updated by [email or web address]: Austin Hoff on 19   5:43 pm
*  Is the patient Alert and Oriented?
Yes
*  How many steps to enter\exit or inside your home? NONE *  PCP DR. MCCALL *  Pharmacy
[facility]
*  Preadmission Environment
Acute Inpatient Rehab
*  Facility Name
[facility] INPATIENT REHAB
*  ADLs
Total Dependent
*  Equipment
None
*  Other Equipment
NO MEDICAL EQUIPMENT PROVIDER PREFERECE
*  List name and contact numbers for known caregivers / representatives who 
currently or will assist patient after discharge:
JULIANNE BERMUDEZ, NATE/POA, [phone]
*  Verbal permission to speak to the caregivers and representatives has been 
obtained from the patient.
Yes
*  Community resources currently utilized
 
None
*  Please name any agencies selected above.
NONE
*  Additional services required to return to the preadmission environment?
Yes
*  Can the patient safely return to the preadmission environment?
Yes
*  Has this patient been hospitalized within the prior 30 days at any 
hospital?
Yes
 
 
 
 
 
 
Patient Name: CHANTAL TIPTON
Encounter #: [id number]
Page [phone]
 
 
 
 
 
Electronically Signed by CHELE RAMON on 19 at 1746
 
 
 
 
 
 
**All edits/amendments must be made on the electronic document**
 
DICTATION DATE: 19     : KARY  19     
RPT#: [id number]                                DC DATE:        
                                               STATUS: ADM IN  
[facility]
[address] [place] [address]
***END OF REPORT***

## 2019-01-04 NOTE — NUR
PT MORE ALERT AND ORIENTED TODAY. PT WONDERS IF SHE HAS HAD A STROKE LAST WEEK
AND THAT'S WHAT HAS CAUSED THIS. ASKED PT IF SHE HAS HAD A STROKE BEFORE SHE
STATED "YES YEARS AGO AND IT AFFECTED MY RIGHT HAND." I STATED TO PT THE CT OF
HER HEAD SHE HAD ON 12/31/81 WAS NORMAL. SHE STATED "OK WELL THAT'S GOOD
BECAUSE I'VE BEEN WORRIED ABOUT IT." PT THEN ALSO SHOWS CONCERNS REGARDING
DISCHARGE PLANNING AND WHERE SHE'LL GO. SHE STATES "I THINK A NURSING HOME IS
BEST FOR ME." WHEN I SPOKE WITH FAMILY YESTERDAY THEY WERE ALSO WANTING TO
SPEAK WITH CASE MANAGEMENT REGARDING PT'S DISCHARGE PLANNING. WILL SPEAK WITH
MD ABOUT PLACING A CASE MANAGEMENT CONSULT.

## 2019-01-04 NOTE — NUR
PT EVALUATED BY LOGAN FROM PHYSICAL THERAPY. HE STATES PT WON'T BE ABLE TO
WALK WITH PHYSICAL THERAPY. HE ALSO STATES SHE IS A MAX/TOTAL ASSIST AND
FAMILY WAS PRESENT DURING EVALUATION.

## 2019-01-04 NOTE — NUR
ORDER RECIEVED FOR NG TUBE TO BE DROPPED FOR A FEEDING TUBE. MEASURED FOR
PLACEMENT. PROCEDURE EXPLAINED TO PT. NG INSERTED VIA L NARES. PT IS
SWALLOWING WATER AND TUBE IS PROGRESSING. AIR INSERTED FOR PLACEMENT. AIR
HEARD. XRAY ORDERED TO CONFIRM. NG TAPED.

## 2019-01-04 NOTE — MORECARE
CASE MANAGEMENT DISCHARGE SUMMARY
 
 
PATIENT: CHANTAL TIPTON                        UNIT: [id number]
ACCOUNT#: [id number]                       ADM DATE: 19
AGE: 87     : [birth year]  SEX: F            ROOM/BED: D.2112    
AUTHOR: CHELE RAMON                             PHYSICIAN:                               
 
REFERRING PHYSICIAN: SHARATH TORRES MD               
DATE OF SERVICE: 19
Discharge Plan
 
 
Patient Name: CHANTAL TIPTON
[facility]
Encounter #: [id number]
Medical Record #: [id number]
: 1931
Planned Disposition: Skilled Nursing Facility
Anticipated Discharge Date: 
 
Discharge Date: 
Expected LOS: 
Initial Reviewer: [email or web address]
Initial Review Date: 2019
Generated: 19   6:54 pm 
Comments
 
DCP- Discharge Planning
 
Updated by [email or web address]: Austin Hoff on 19   4:52 pm CT
Patient Name: CHANTAL TIPTON                                     
Admission Status: Elective   
Accout number: [id number]                              
Admission Date: 2019   
: 1931                                                        
Admission Diagnosis:ACUTE KIDNEY FAILURE, UNSPECIFIED   
Attending: SHARATH TORRES                                                
Current LOS:  2   
  
Anticipated DC Date:    
Planned Disposition: Skilled Nursing Facility   
Primary Insurance: [employer] CHOICE PPO MCR ADVANT   
PLANNED EXTERNAL PROVIDER:  [facility], MEDICARE REHAB 
BED  
  
Discharge Planning Comments:   
CM RECEIVED ORDER INDICATING PT WANTS NURSING HOME CARE.  CM MET WITH PT AND 
SON IN ROOM TO DISCUSS DISCHARGE PLANNING AND NEEDS. PT REPORTS THAT BEFORE 
GETTING SICK, SHE WAS LIVING AT HOME WITH HER SON, INDEPENDENT IN HER CARE.  
 
PT REPORTS SHE DID TELL THE DOCTOR THAT SHE WOULD BE BETTER OFF IN A NURSING 
HOME BEFORE FAMILY ARRIVED TODAY.  PT WANTS REHAB, NOT LONG TERM CARE.   PT 
HAS NO MEDICAL EQUIPMENT AND NO OUTSIDE SERVICES ASSISTING IN THE HOME. CM 
DISCUSSED AVAILABILITY OF HOME HEALTH, REHAB SERVICES AND MEDICAL EQUIPMENT. 
PT'S SON INITIALLY DECLINED TO PARTICIPATE IN DISCHARGE PLANNING REPORTING PT 
WAS SENT TO REHAB DOWNSTAIRS TOO SOON WHEN SHE WAS NOT ABLE TO WALK OR EVEN 
FEED HERSELF. SON WANTS PT CONSIDERED FOR INPATIENT REHAB AGAIN STATING THEY 
SENT HER BACK HERE BECAUSE SHE USED ALL OF HER DAYS DOWN THERE.  PT DOES NOT 
THINK SHE CAN PARTICIPATE IN THREE HOURS OF PROGRESSIVE THERAPY PER DAY.  CM 
EXPLAINED THAT IF PT IS NOT ABLE TO PARTICIPATE IN THE THREE HOURS OF 
PROGRESSIVE THERAPY, IS NOT ABLE TO STAND OR FEED HERSELF, SHE IS NOT GOING 
TO BE APPROPRIATE FOR READMISSION TO INPATENT REHAB.  CM DISCUSSED 
AVAILABILITY OF SKILLED NURSING REHAB SERVICES AND PROVIDED CHOICE FORM.  
PT'S SON REPORTS HE ALREADY SIGNED ONE OF THESE FOR [facility] THE LAST 
VISIT AND SPEAKING WITH THIS CM.  CM EXPLAINED A NEW ONE WAS NEEDED.  PT'S 
SON SIGNED THE FORM AND DOES NOT WANT PT SENT TO REHAB BEFORE SHE IS STABLE.  
CM EXPLAINED THAT ALL DOCTORS WOULD HAVE TO INDICATE PT IS READY TO DISCHARGE 
AND CM IS ONLY WANTING TO BE PROACTIVE ON PT'S BEHALF FOR DISCHARGE PLANNING 
AND SECURE REHAB BED AHEAD OF TIME TO ENSURE THAT PT IS ABLE TO GET INTO [facility] WHEN DISCHARGED AS [facility] IS POPULAR REHAB AND DOES FILL UP.  
PT'S SON WILL WANTS PT TO BE STABLE FOR DISCHARGE FOR CM TO SEND REFERRALS.  
CHOICE SIGNED. CM PROVIDED PT'S SON WITH CM CONTACT INFORMATION.  
  
CM TO SEND REFERRAL TO [facility] WHEN PROJECTED 
DISCHARGE DATE IS KNOWN, AS PT'S SON DOES NOT WANT REHAB REFERRAL SENT OUT 
PRIOR TO PT'S MEDICAL STABILITY FOR DISCHARGE TO REHAB.  
  
: Austin Hoff
 
 DCPIA - Discharge Planning Initial Assessment
 
Updated by [id number]: Austin Hoff on 19   5:43 pm
*  Is the patient Alert and Oriented?
Yes
*  How many steps to enter\exit or inside your home? NONE *  PCP DR. MCCALL *  Pharmacy
[facility]
*  Preadmission Environment
Acute Inpatient Rehab
*  Facility Name
[facility] INPATIENT REHAB
*  ADLs
Total Dependent
*  Equipment
None
*  Other Equipment
NO MEDICAL EQUIPMENT PROVIDER PREFERECE
*  List name and contact numbers for known caregivers / representatives who 
currently or will assist patient after discharge:
JULIANNE BERMUDEZ, SON/POA, [phone]
*  Verbal permission to speak to the caregivers and representatives has been 
obtained from the patient.
Yes
 
*  Community resources currently utilized
None
*  Please name any agencies selected above.
NONE
*  Additional services required to return to the preadmission environment?
Yes
*  Can the patient safely return to the preadmission environment?
Yes
*  Has this patient been hospitalized within the prior 30 days at any 
hospital?
Yes
 
External Providers
External Provider: [facility] [place]
 
Next Contact Date: 2019
Service Request Date: 
Service Type: 
Resolution: 
 
Reviewer: 
Comments: 
 
 
 
 
Coverage Notice
 
Reviewer: [email or web address] - Austin Hoff
 
Notice Issued Date-Time: 2019  13:00
Notice Type: Patient Choice Letter
 
Notice Delivered To: Family Member
Relationship to Patient: Son
Representative Name: JULIANNE BERMUDEZ
 
Delivery Method: HAND - Hand Delivered
Ariadne Days:
Prior Verbal Notification: 
 
Recipient Understood Notice: Yes
Recipient Signature: Yes
Med Rec Note Co-signed by Attending:
 
Coverage Notice Comment:  [facility]
 
Last DP export: 19   4:46 pm
Patient Name: CHANTAL TIPTON
 
Encounter #: [id number]
Page [id number]
 
 
 
 
 
Electronically Signed by CHELE RAMON on 19 at 1754
 
 
 
 
 
 
**All edits/amendments must be made on the electronic document**
 
DICTATION DATE: 19     : KARY  19     
RPT#: [id number]                                DC DATE:        
                                               STATUS: ADM IN  
[facility]
[address] [place] [address]
***END OF REPORT***

## 2019-01-04 NOTE — NUR
RESUME CARE, PT IN BED ALERT NG TUBE IN PLACE, ASKED PT HOW SHE WAS SHE
FEELING STATED NOT TO GOOD SHE WAS UNCOMFORTABLE, HAD TECH TO ASSIST ME WITH
ADJUSTING IN BED AND NOTICE HER FIRST STEP MATTRESS WAS UNPLUGED, READJUSTING
AND PLUGGING UP MATTRESS PT STATED SHE FELT BETTER PT C/O PAIN IN HER BACK
ADVISED HER O WOULD BRING SOMETHING FOR PAIN AFTER I ROUNDED SHE VOICED
UNDERSTANDING NO OTHER COMPLAINTS OR DISTRESS AT THIS TIME CALL LIGHT IN REACH
WILL CONT TO MONITOR

## 2019-01-04 NOTE — NUR
NG TUBE FLUSHED WITH 20ML OF WATER. NG TUBE FEEDINGS SET ON KANGAROO PUMP PER
ODERS OF OSMOLITE 1.0 AT 20ML/HR AND FLUSH 60ML OF WATER Q4H AND TF STARTED.
PT HOB AT 35 DEGREES. WILL CONTINUE TO MONITOR.

## 2019-01-04 NOTE — NUR
Nutrition consult:
Received order for TF.
When NGT placed, start Osmolite 1.0 aubrey @ 20 ml/hr.
Check residuals per protocol.
If residuals are < 125 ml, increase TF 10 ml Q 6 hours to goal rate of 65
ml/hr
Flush tube with 60 mlH2O q 4 hours.
RDN following.

## 2019-01-04 NOTE — NUR
NGT TUB INSERTED IN RIGHT NARE. LISTENED WITH STETHOSCOPE AT ABDOMEN AND AIR
HEARD. STAT XRAY ORDERED.

## 2019-01-05 VITALS — SYSTOLIC BLOOD PRESSURE: 173 MMHG | DIASTOLIC BLOOD PRESSURE: 81 MMHG

## 2019-01-05 VITALS — DIASTOLIC BLOOD PRESSURE: 66 MMHG | SYSTOLIC BLOOD PRESSURE: 126 MMHG

## 2019-01-05 VITALS — DIASTOLIC BLOOD PRESSURE: 82 MMHG | SYSTOLIC BLOOD PRESSURE: 175 MMHG

## 2019-01-05 VITALS — SYSTOLIC BLOOD PRESSURE: 132 MMHG | DIASTOLIC BLOOD PRESSURE: 65 MMHG

## 2019-01-05 LAB
ALBUMIN SERPL-MCNC: 1.8 G/DL (ref 3.4–5)
ALP SERPL-CCNC: 85 U/L (ref 46–116)
ALT SERPL-CCNC: 13 U/L (ref 10–68)
ANION GAP SERPL CALC-SCNC: 16.5 MMOL/L (ref 8–16)
BASOPHILS NFR BLD AUTO: 0.1 % (ref 0–2)
BILIRUB SERPL-MCNC: 0.21 MG/DL (ref 0.2–1.3)
BUN SERPL-MCNC: 42 MG/DL (ref 7–18)
CALCIUM SERPL-MCNC: 7.6 MG/DL (ref 8.5–10.1)
CHLORIDE SERPL-SCNC: 115 MMOL/L (ref 98–107)
CO2 SERPL-SCNC: 19.8 MMOL/L (ref 21–32)
CREAT SERPL-MCNC: 1.9 MG/DL (ref 0.6–1.3)
EOSINOPHIL NFR BLD: 0.6 % (ref 0–7)
ERYTHROCYTE [DISTWIDTH] IN BLOOD BY AUTOMATED COUNT: 16.1 % (ref 11.5–14.5)
GLOBULIN SER-MCNC: 3.2 G/L
GLUCOSE SERPL-MCNC: 104 MG/DL (ref 74–106)
HCT VFR BLD CALC: 28.5 % (ref 36–48)
HGB BLD-MCNC: 9 G/DL (ref 12–16)
IMM GRANULOCYTES NFR BLD: 1.6 % (ref 0–5)
INR PPP: 2.69 (ref 0.85–1.17)
LYMPHOCYTES NFR BLD AUTO: 16 % (ref 15–50)
MCH RBC QN AUTO: 31.6 PG (ref 26–34)
MCHC RBC AUTO-ENTMCNC: 31.6 G/DL (ref 31–37)
MCV RBC: 100 FL (ref 80–100)
MONOCYTES NFR BLD: 12.4 % (ref 2–11)
NEUTROPHILS NFR BLD AUTO: 69.3 % (ref 40–80)
OSMOLALITY SERPL CALC.SUM OF ELEC: 302 MOSM/KG (ref 275–300)
PLATELET # BLD: 323 10X3/UL (ref 130–400)
PMV BLD AUTO: 9.8 FL (ref 7.4–10.4)
POTASSIUM SERPL-SCNC: 4.3 MMOL/L (ref 3.5–5.1)
PROT SERPL-MCNC: 5 G/DL (ref 6.4–8.2)
PROTHROMBIN TIME: 27.9 SECONDS (ref 11.6–15)
RBC # BLD AUTO: 2.85 10X6/UL (ref 4–5.4)
SODIUM SERPL-SCNC: 147 MMOL/L (ref 136–145)
WBC # BLD AUTO: 6.8 10X3/UL (ref 4.8–10.8)

## 2019-01-05 NOTE — NUR
RECIEVED UP IN BED WITH HOB ELEVATED. NG TUBE IN PLACE WITH OSMOLITE 1.0
INFUSING AT 65ML/HR AND 100ML H20 FLUSH Q4 HR. AROUSES WITH VERBAL STIMULI.
DENEIS ANY NEEDS AT THIS TIME. WILL CONT. POC.

## 2019-01-05 NOTE — NUR
Calorie count ordered
 
Date: 1/4/19:
Breakfast:   0 calories
Lunch:       10 calories
Dinner:      Not recorded
 
RD following

## 2019-01-05 NOTE — NUR
IV PAIN MEDICATION BROUGHT DOWN TO DIALYSIS CLINIC FOR REQUEST OF PAIN
MEDICATION FOR RIGHT THIGH LEG

## 2019-01-05 NOTE — NUR
PATIENT BACK IN ROOM AFTER WHOLE BODY BONE SCAN. IV NORMAL SALINE RECONNECTED.
NO AT 75CC/HR RUNNING. G TUBE CHECKED FOR RESIDUAL. NO RESIDUAL. FEEDING
ADVANCED TO 65CC/HR WITH 100CC FLUSH Q 4 HOURSE.

## 2019-01-06 VITALS — SYSTOLIC BLOOD PRESSURE: 157 MMHG | DIASTOLIC BLOOD PRESSURE: 61 MMHG

## 2019-01-06 VITALS — SYSTOLIC BLOOD PRESSURE: 154 MMHG | DIASTOLIC BLOOD PRESSURE: 87 MMHG

## 2019-01-06 VITALS — DIASTOLIC BLOOD PRESSURE: 81 MMHG | SYSTOLIC BLOOD PRESSURE: 179 MMHG

## 2019-01-06 VITALS — DIASTOLIC BLOOD PRESSURE: 65 MMHG | SYSTOLIC BLOOD PRESSURE: 181 MMHG

## 2019-01-06 VITALS — SYSTOLIC BLOOD PRESSURE: 109 MMHG | DIASTOLIC BLOOD PRESSURE: 76 MMHG

## 2019-01-06 VITALS — SYSTOLIC BLOOD PRESSURE: 177 MMHG | DIASTOLIC BLOOD PRESSURE: 82 MMHG

## 2019-01-06 LAB
ALBUMIN SERPL-MCNC: 1.8 G/DL (ref 3.4–5)
ALP SERPL-CCNC: 89 U/L (ref 46–116)
ALT SERPL-CCNC: 15 U/L (ref 10–68)
ANION GAP SERPL CALC-SCNC: 13.1 MMOL/L (ref 8–16)
BASOPHILS NFR BLD AUTO: 0.2 % (ref 0–2)
BILIRUB SERPL-MCNC: 0.26 MG/DL (ref 0.2–1.3)
BUN SERPL-MCNC: 33 MG/DL (ref 7–18)
CALCIUM SERPL-MCNC: 7.5 MG/DL (ref 8.5–10.1)
CHLORIDE SERPL-SCNC: 114 MMOL/L (ref 98–107)
CO2 SERPL-SCNC: 20.3 MMOL/L (ref 21–32)
CREAT SERPL-MCNC: 1.4 MG/DL (ref 0.6–1.3)
EOSINOPHIL NFR BLD: 1 % (ref 0–7)
ERYTHROCYTE [DISTWIDTH] IN BLOOD BY AUTOMATED COUNT: 16.4 % (ref 11.5–14.5)
GLOBULIN SER-MCNC: 3.4 G/L
GLUCOSE SERPL-MCNC: 142 MG/DL (ref 74–106)
HCT VFR BLD CALC: 30.1 % (ref 36–48)
HGB BLD-MCNC: 9.3 G/DL (ref 12–16)
IMM GRANULOCYTES NFR BLD: 1.2 % (ref 0–5)
INR PPP: 1.69 (ref 0.85–1.17)
LYMPHOCYTES NFR BLD AUTO: 10.5 % (ref 15–50)
MCH RBC QN AUTO: 31.1 PG (ref 26–34)
MCHC RBC AUTO-ENTMCNC: 30.9 G/DL (ref 31–37)
MCV RBC: 100.7 FL (ref 80–100)
MONOCYTES NFR BLD: 10.5 % (ref 2–11)
NEUTROPHILS NFR BLD AUTO: 76.6 % (ref 40–80)
OSMOLALITY SERPL CALC.SUM OF ELEC: 293 MOSM/KG (ref 275–300)
PHOSPHATE SERPL-MCNC: 1.6 MG/DL (ref 2.5–4.9)
PLATELET # BLD: 411 10X3/UL (ref 130–400)
PMV BLD AUTO: 10.2 FL (ref 7.4–10.4)
POTASSIUM SERPL-SCNC: 4.4 MMOL/L (ref 3.5–5.1)
PROT SERPL-MCNC: 5.2 G/DL (ref 6.4–8.2)
PROTHROMBIN TIME: 19.3 SECONDS (ref 11.6–15)
RBC # BLD AUTO: 2.99 10X6/UL (ref 4–5.4)
SODIUM SERPL-SCNC: 143 MMOL/L (ref 136–145)
WBC # BLD AUTO: 10.4 10X3/UL (ref 4.8–10.8)

## 2019-01-06 NOTE — NUR
PATIENT ALERT WITH SOME FORGETFULLNESS NOTED. ON A FIRST STEP MATTRESS. CALL
LIGHT WITHIN REACH. VOICES NO NEEDS AT THIS TIME. WILL CONTINUE WITH PLAN OF
CARE.

## 2019-01-06 NOTE — NUR
RECIEVED UP IN BED WITH HOB ELEVATED. AROUSES WITH VERBAL STIMULI. N/B TUBE IN
PLACE AND OSMOLITE 1.0 INFUSING AT 65CC/HR. ABD SOFT AND NONTENDER. 1ST STEP
OVERLAY IN PLACE. DENIES ANY NEEDS. WILL CONT. POC.

## 2019-01-06 NOTE — NUR
RESTING IN BED WITH EYES CLOSED AT THIS TIME. HOB ELEVATED. NG INFUSINING
OSMOLITE AT 65CC/HR. WILL CONT. POC.

## 2019-01-07 VITALS — DIASTOLIC BLOOD PRESSURE: 62 MMHG | SYSTOLIC BLOOD PRESSURE: 117 MMHG

## 2019-01-07 VITALS — SYSTOLIC BLOOD PRESSURE: 160 MMHG | DIASTOLIC BLOOD PRESSURE: 91 MMHG

## 2019-01-07 VITALS — SYSTOLIC BLOOD PRESSURE: 146 MMHG | DIASTOLIC BLOOD PRESSURE: 58 MMHG

## 2019-01-07 VITALS — DIASTOLIC BLOOD PRESSURE: 80 MMHG | SYSTOLIC BLOOD PRESSURE: 137 MMHG

## 2019-01-07 VITALS — SYSTOLIC BLOOD PRESSURE: 150 MMHG | DIASTOLIC BLOOD PRESSURE: 76 MMHG

## 2019-01-07 VITALS — DIASTOLIC BLOOD PRESSURE: 62 MMHG | SYSTOLIC BLOOD PRESSURE: 161 MMHG

## 2019-01-07 LAB
ALBUMIN SERPL-MCNC: 2 G/DL (ref 3.4–5)
ALP SERPL-CCNC: 103 U/L (ref 46–116)
ALT SERPL-CCNC: 31 U/L (ref 10–68)
AMORPHOUS SEDIMENT: (no result) /LPF
ANION GAP SERPL CALC-SCNC: 14.5 MMOL/L (ref 8–16)
APPEARANCE UR: (no result)
BACTERIA #/AREA URNS HPF: (no result) /HPF
BASOPHILS NFR BLD AUTO: 0.2 % (ref 0–2)
BILIRUB SERPL-MCNC: 0.36 MG/DL (ref 0.2–1.3)
BILIRUB SERPL-MCNC: NEGATIVE MG/DL
BUN SERPL-MCNC: 29 MG/DL (ref 7–18)
CALCIUM SERPL-MCNC: 8.1 MG/DL (ref 8.5–10.1)
CHLORIDE SERPL-SCNC: 107 MMOL/L (ref 98–107)
CO2 SERPL-SCNC: 23.5 MMOL/L (ref 21–32)
COLOR UR: YELLOW
CREAT SERPL-MCNC: 1.2 MG/DL (ref 0.6–1.3)
EOSINOPHIL NFR BLD: 0.7 % (ref 0–7)
ERYTHROCYTE [DISTWIDTH] IN BLOOD BY AUTOMATED COUNT: 16.2 % (ref 11.5–14.5)
GLOBULIN SER-MCNC: 3.7 G/L
GLUCOSE SERPL-MCNC: 114 MG/DL (ref 74–106)
GLUCOSE SERPL-MCNC: NEGATIVE MG/DL
HCT VFR BLD CALC: 32.2 % (ref 36–48)
HGB BLD-MCNC: 10.1 G/DL (ref 12–16)
IMM GRANULOCYTES NFR BLD: 2.4 % (ref 0–5)
INR PPP: 1.32 (ref 0.85–1.17)
KETONES UR STRIP-MCNC: NEGATIVE MG/DL
LYMPHOCYTES NFR BLD AUTO: 11.6 % (ref 15–50)
MAGNESIUM SERPL-MCNC: 1.8 MG/DL (ref 1.8–2.4)
MCH RBC QN AUTO: 31.3 PG (ref 26–34)
MCHC RBC AUTO-ENTMCNC: 31.4 G/DL (ref 31–37)
MCV RBC: 99.7 FL (ref 80–100)
MONOCYTES NFR BLD: 9.9 % (ref 2–11)
MUCOUS THREADS #/AREA URNS LPF: (no result) /LPF
NEUTROPHILS NFR BLD AUTO: 75.2 % (ref 40–80)
NITRITE UR-MCNC: NEGATIVE MG/ML
OSMOLALITY SERPL CALC.SUM OF ELEC: 285 MOSM/KG (ref 275–300)
PH UR STRIP: 5 [PH] (ref 5–6)
PHOSPHATE SERPL-MCNC: 1.4 MG/DL (ref 2.5–4.9)
PLATELET # BLD: 508 10X3/UL (ref 130–400)
PMV BLD AUTO: 9.9 FL (ref 7.4–10.4)
POTASSIUM SERPL-SCNC: 5 MMOL/L (ref 3.5–5.1)
PROT SERPL-MCNC: 5.7 G/DL (ref 6.4–8.2)
PROT UR-MCNC: NEGATIVE MG/DL
PROTHROMBIN TIME: 15.8 SECONDS (ref 11.6–15)
RBC # BLD AUTO: 3.23 10X6/UL (ref 4–5.4)
RBC #/AREA URNS HPF: (no result) /HPF (ref 0–5)
SODIUM SERPL-SCNC: 140 MMOL/L (ref 136–145)
SP GR UR STRIP: 1.01 (ref 1–1.02)
SQUAMOUS #/AREA URNS HPF: (no result) /HPF (ref 0–5)
UROBILINOGEN UR-MCNC: NORMAL MG/DL
WBC # BLD AUTO: 12.9 10X3/UL (ref 4.8–10.8)
WBC #/AREA URNS HPF: (no result) /HPF (ref 0–5)

## 2019-01-07 NOTE — NUR
CRITICAL PHOS 1.4. DR RESTREPO HERE AND MADE AWARE OF THIS. HE ALSO IS MADE
AWARE OF NEEDING NAUSEA MEDICATION.

## 2019-01-07 NOTE — NUR
THE PATIENT IS MORE CONFUSED, NOT KNOWING WHERE SHE IS, AND SHE IS SPEAKING
OUT TO HER DOG. WILL CONT TO MONITOR

## 2019-01-07 NOTE — NUR
LAB RESULTS RECIEVED, AND PHOS LEVEL 1.4 (CL).  JAN 6, 2019 PHOS RESULT WAS
1.6 (L)AND WENT UNTREATED. WILL CONT TO MONITOR. LABS ORDERED FOR NEXT 5 DAYS
PER PROTOCOL

## 2019-01-07 NOTE — NUR
PER DR RESTREPO, THE PATIENT WILL HAVE IV PHOS TO COVER CRITICAL LOW RESULT VS
FOLLOR THE PO PACKETS.

## 2019-01-07 NOTE — NUR
PT ALERT AND ORIENTED WHEN ENTERING THE ROOM. PT STATES NO PAIN AT THIS
TIME. PT HAS NGT FOR NURTIRION WITH OSMOLITE 1.0 INFUSING CURRENTLY AT 60CC
PER HOUR. WILL FLUSH ACCORDING TO ORDER. PT HAS SALINE LOCKED RIGHT HAND IV.
PT ON FIRST STEP OVERLAY MATTRESS. NO DISTRESS. SLIGHT HAND CONTRACTURES NOTED
TO THE RIGHT HAND/ARM. HEELS ARE BRIDGED WITH PINK BOOTIES ON. NO DISTRESS
NOTED AT THIS TIME.

## 2019-01-07 NOTE — NUR
DR FREED IN ROOM AT THIS TIME WITH HERBERTH BISHOP NURSE FROM IR. ASKED IF THEY
WANTED US TO MAKE HER NPO AND SHE SAID THAT SHE WILL LET US KNOW. Patient comes to clinic for follow up anticoagulation visit.   Last INR 6/13/18 was 2.6.  Dose maintained per protocol.   Today's INR is 2.5 and is within goal range.    Current warfarin dosing verified with patient. Patient was informed that their INR result is within therapeutic range and instructed to maintain current dose per protocol. Discussed dose and return date for next INR.    Dr. Contreras is in the office today supervising the treatment. Note forwarded to physician for review.    Patient was instructed to contact the clinic with any unusual bleeding or bruising, any changes in medications, diet, health status, lifestyle, or any other changes, questions or concerns. Patient verbalized understanding of all discussed.

## 2019-01-07 NOTE — NUR
PT APPEARS TO BE CONFUSED AT TIMES. STATES SHE KNOWS HOW TO TAKE MEDS BUT HAS
TO BE PROMPTED BY ASKING HER TO OPEN HER MOUTH TO ACTUALLY TAKE MEDICATIONS.
PT DID TAKE AND TOLERATED WELL. NO OTHER ISSUES AT THIS TIME. HANK ALARM IS
ON FOR FALL PRECAUTIONS. FSBS 131. REPOSITIONED TO RIGHT SIDE FOR COMFORT.
WILL CONTINUE TO MONITOR.

## 2019-01-07 NOTE — NUR
RECIEVED PATIENT CARE/REPORT WITH PATIENT RESTING IN BED, SUPINE, LIGHTS AND
TV OFF.  REPORT GIVEN DID NOT INCLUDE THE ELECTROLYT PROTOCOL.  LABS ORDERED
PER PROTOCOL. PATIENT REPORT BACK PAIN. NG TUBE TO LEFT NARE INFUSING AT 65
ML/HR.  CALL LIGHT IN REACH

## 2019-01-07 NOTE — NUR
ORDER TO RESUME PREVIOUS DIET AND RESTART NG FEEDING OF OSMOLITE, RESTARTED
FEED TO NG TUBE AT THIS TIME

## 2019-01-07 NOTE — NUR
CALLED TO ROOM, PATIENT TO COMPLAIN OF BEING NAUSEATED. NO MEDS ORDERED FOR
THIS. WILL PLACE CALL TO MD.

## 2019-01-07 NOTE — NUR
PATIENT IS PLACED ON LEFT SIDE WITH PILLOW BEHIND BACK FOR COMFORT. ROLLED UP
SHEET PLACED IN BETWEEN KNEES FOR COMFORT.

## 2019-01-08 VITALS — SYSTOLIC BLOOD PRESSURE: 134 MMHG | DIASTOLIC BLOOD PRESSURE: 62 MMHG

## 2019-01-08 VITALS — SYSTOLIC BLOOD PRESSURE: 157 MMHG | DIASTOLIC BLOOD PRESSURE: 70 MMHG

## 2019-01-08 VITALS — DIASTOLIC BLOOD PRESSURE: 67 MMHG | SYSTOLIC BLOOD PRESSURE: 142 MMHG

## 2019-01-08 VITALS — DIASTOLIC BLOOD PRESSURE: 59 MMHG | SYSTOLIC BLOOD PRESSURE: 140 MMHG

## 2019-01-08 VITALS — DIASTOLIC BLOOD PRESSURE: 68 MMHG | SYSTOLIC BLOOD PRESSURE: 139 MMHG

## 2019-01-08 VITALS — SYSTOLIC BLOOD PRESSURE: 131 MMHG | DIASTOLIC BLOOD PRESSURE: 54 MMHG

## 2019-01-08 LAB
ALBUMIN SERPL-MCNC: 1.8 G/DL (ref 3.4–5)
ALP SERPL-CCNC: 106 U/L (ref 46–116)
ALT SERPL-CCNC: 33 U/L (ref 10–68)
ANION GAP SERPL CALC-SCNC: 12.4 MMOL/L (ref 8–16)
BASOPHILS NFR BLD AUTO: 0.2 % (ref 0–2)
BILIRUB SERPL-MCNC: 0.25 MG/DL (ref 0.2–1.3)
BUN SERPL-MCNC: 29 MG/DL (ref 7–18)
CALCIUM SERPL-MCNC: 7.9 MG/DL (ref 8.5–10.1)
CHLORIDE SERPL-SCNC: 107 MMOL/L (ref 98–107)
CO2 SERPL-SCNC: 24.9 MMOL/L (ref 21–32)
CREAT SERPL-MCNC: 1.3 MG/DL (ref 0.6–1.3)
EOSINOPHIL NFR BLD: 1 % (ref 0–7)
ERYTHROCYTE [DISTWIDTH] IN BLOOD BY AUTOMATED COUNT: 16.4 % (ref 11.5–14.5)
GLOBULIN SER-MCNC: 3.9 G/L
GLUCOSE SERPL-MCNC: 93 MG/DL (ref 74–106)
HCT VFR BLD CALC: 30.9 % (ref 36–48)
HGB BLD-MCNC: 9.8 G/DL (ref 12–16)
IMM GRANULOCYTES NFR BLD: 2 % (ref 0–5)
LYMPHOCYTES NFR BLD AUTO: 9 % (ref 15–50)
MAGNESIUM SERPL-MCNC: 1.9 MG/DL (ref 1.8–2.4)
MCH RBC QN AUTO: 31.5 PG (ref 26–34)
MCHC RBC AUTO-ENTMCNC: 31.7 G/DL (ref 31–37)
MCV RBC: 99.4 FL (ref 80–100)
MONOCYTES NFR BLD: 10.8 % (ref 2–11)
NEUTROPHILS NFR BLD AUTO: 77 % (ref 40–80)
OSMOLALITY SERPL CALC.SUM OF ELEC: 283 MOSM/KG (ref 275–300)
PHOSPHATE SERPL-MCNC: 2.9 MG/DL (ref 2.5–4.9)
PLATELET # BLD: 446 10X3/UL (ref 130–400)
PMV BLD AUTO: 10.1 FL (ref 7.4–10.4)
POTASSIUM SERPL-SCNC: 5.3 MMOL/L (ref 3.5–5.1)
PROT SERPL-MCNC: 5.7 G/DL (ref 6.4–8.2)
RBC # BLD AUTO: 3.11 10X6/UL (ref 4–5.4)
SODIUM SERPL-SCNC: 139 MMOL/L (ref 136–145)
WBC # BLD AUTO: 10.2 10X3/UL (ref 4.8–10.8)

## 2019-01-08 NOTE — NUR
ORAL AMD MOUTH CARE PERFORMMED. PATIENT TOOK SEVERAL SMALL SIPS OF WATER USING
A STRAW. HEAD TILT CHIN POSITION TO SWALLOW. TOLERATED WELL. RE-POSTIONED TO
LEFT SIDE WITH PILLOWS FOR COMFORT.

## 2019-01-08 NOTE — NUR
PT RESTING IN BED ALERT AND CONFUSED. PT HAS NG TUBE WITH RISIDUAL OF 30.
FEEDING AT 65ML/HR WITH 100ML FLUSH. PT BED LOW CALL LIGHT WITHIN REACH. WILL
CONTINUE TO MONITOR.

## 2019-01-08 NOTE — NUR
CHECKED PATIENT FOR INCONT. DRY AND CLEAN AT THIS TIME. PATIENT IS MORE ALERT
THIS AFTERNOON, DENIES ANY NEEDS AT THIS TIME. ORAL CARE GIVEN AGAIN.

## 2019-01-08 NOTE — NUR
PT COMPLAINING OF BACK PAIN. ADMINISTERED PRN PAIN MEDICINE AND REPOSITIONED
PT MULTIPLE TIMES IN ORDER TO BE COMFORTABLE.

## 2019-01-08 NOTE — NUR
WITH MY ASSISTANCE, PATIENT TOOK 4 BITES EACH OF HER RICE AND GRAVY AND SOME
CHICKEN BREAST. SITTING IN UPRIGHT POSITION WHILE EATING.

## 2019-01-08 NOTE — NUR
Intact blister noted on right heel measuring 2cm x 2cm (stage 2 pressure
injury). Recommended covering blister with skin protectant (wipes) several
times a day and keeping heels bridged.
Pt has heel protectors in place.
Also noted redness to right great toe (medial side of nail), possibly an
ingrown nail but warrents continued assessment.
Wound care will continue monitoring.

## 2019-01-08 NOTE — NUR
I ASSISTED PATIENT IN EATING GREENS BEANS AND RENAL SPAGHETTI, 4 BITES OF
EACH. SHE DID NOT WANT A BITE OF HER CAKE OR DRINK OF TEA.

## 2019-01-09 VITALS — SYSTOLIC BLOOD PRESSURE: 165 MMHG | DIASTOLIC BLOOD PRESSURE: 85 MMHG

## 2019-01-09 VITALS — SYSTOLIC BLOOD PRESSURE: 167 MMHG | DIASTOLIC BLOOD PRESSURE: 78 MMHG

## 2019-01-09 VITALS — DIASTOLIC BLOOD PRESSURE: 52 MMHG | SYSTOLIC BLOOD PRESSURE: 157 MMHG

## 2019-01-09 VITALS — SYSTOLIC BLOOD PRESSURE: 93 MMHG | DIASTOLIC BLOOD PRESSURE: 60 MMHG

## 2019-01-09 VITALS — DIASTOLIC BLOOD PRESSURE: 41 MMHG | SYSTOLIC BLOOD PRESSURE: 153 MMHG

## 2019-01-09 VITALS — DIASTOLIC BLOOD PRESSURE: 53 MMHG | SYSTOLIC BLOOD PRESSURE: 140 MMHG

## 2019-01-09 LAB
ALBUMIN SERPL-MCNC: 1.8 G/DL (ref 3.4–5)
ALP SERPL-CCNC: 118 U/L (ref 46–116)
ALT SERPL-CCNC: 33 U/L (ref 10–68)
ANION GAP SERPL CALC-SCNC: 15.1 MMOL/L (ref 8–16)
BASOPHILS NFR BLD AUTO: 0.9 % (ref 0–2)
BILIRUB SERPL-MCNC: 0.37 MG/DL (ref 0.2–1.3)
BUN SERPL-MCNC: 35 MG/DL (ref 7–18)
CALCIUM SERPL-MCNC: 8 MG/DL (ref 8.5–10.1)
CHLORIDE SERPL-SCNC: 105 MMOL/L (ref 98–107)
CO2 SERPL-SCNC: 20.9 MMOL/L (ref 21–32)
CREAT SERPL-MCNC: 1.2 MG/DL (ref 0.6–1.3)
EOSINOPHIL NFR BLD: 1.6 % (ref 0–7)
ERYTHROCYTE [DISTWIDTH] IN BLOOD BY AUTOMATED COUNT: 16.4 % (ref 11.5–14.5)
GLOBULIN SER-MCNC: 3 G/L
GLUCOSE SERPL-MCNC: 114 MG/DL (ref 74–106)
HCT VFR BLD CALC: 34.6 % (ref 36–48)
HGB BLD-MCNC: 11.2 G/DL (ref 12–16)
IMM GRANULOCYTES NFR BLD: 3.7 % (ref 0–5)
LYMPHOCYTES NFR BLD AUTO: 10.3 % (ref 15–50)
MAGNESIUM SERPL-MCNC: 2.2 MG/DL (ref 1.8–2.4)
MCH RBC QN AUTO: 31.9 PG (ref 26–34)
MCHC RBC AUTO-ENTMCNC: 32.4 G/DL (ref 31–37)
MCV RBC: 98.6 FL (ref 80–100)
MONOCYTES NFR BLD: 11 % (ref 2–11)
NEUTROPHILS NFR BLD AUTO: 72.5 % (ref 40–80)
OSMOLALITY SERPL CALC.SUM OF ELEC: 278 MOSM/KG (ref 275–300)
PHOSPHATE SERPL-MCNC: 2.4 MG/DL (ref 2.5–4.9)
PLATELET # BLD: 455 10X3/UL (ref 130–400)
PMV BLD AUTO: 10.7 FL (ref 7.4–10.4)
POTASSIUM SERPL-SCNC: 6 MMOL/L (ref 3.5–5.1)
PROT SERPL-MCNC: 4.8 G/DL (ref 6.4–8.2)
RBC # BLD AUTO: 3.51 10X6/UL (ref 4–5.4)
SODIUM SERPL-SCNC: 135 MMOL/L (ref 136–145)
WBC # BLD AUTO: 13.7 10X3/UL (ref 4.8–10.8)

## 2019-01-09 NOTE — NUR
Nutrition follow-up:
Pt with Osmolite 1.0 aubrey @ 65 ml/hr.  K trending up; Dr. Flores wants TF
formula changed to Nepro.
Will change TF formula to Nepro @ 30 ml/hr.
RDN following.

## 2019-01-09 NOTE — NUR
PT JUST HOLLERED AT ME STATING "GET THE DOG OUT OF HERE AND OFF ME" EXPLAINED
TO PT SHE IS IN THE HOSPITAL AND THERE IS NO DOG HERE HOWEVER SHE IS VERY
CONVINCED THERE IS. PT WAS ORIENTED THIS MORNING BUT APPARENTLY HAS IN/OUT
BOUTS OF CONFUSION. CNA AT BEDSIDE AND PROVIDED PT WITH COMPLETE BED BATH PT
NOW LYING FLAT IN BED RESTING QUIETLY. CL IN REACH. WILL CTM.

## 2019-01-09 NOTE — NUR
PT RESTING QUIETLY IN BED WITH EYES CLOSED. RR NONLABORED ON RA. PT DENIES ANY
CURRENT NEEDS. CL IN REACH. WILL PASS ON REPORT.

## 2019-01-09 NOTE — NUR
PTS R.FA PIV IS LEAKING. D/C WITH CATHETER TIP FULLY INTACT. NEW 22 GUAGE PIV
INSERTED TO R.FA X1 STICK. PT RESTING WITH  AT BEDSIDE. NOW ORIENTED
WILL CTM.

## 2019-01-09 NOTE — NUR
PT WANTING HER NGT OUT SO SHE CAN TRY TO EAT AND STATES ITS HURTING HER. PTS
POTASSIUM WAS HIGH AND WILL NEED KAYEXELATE. PROVIDED VIA NGT AND FLUSHED
WITHOUT ANY DIFFICULTIES. REMOVED NGT WITH TIP FULLY INTACT AND PT TOLERATED
ALRIGHT HOWEVER SHE DID COMPLAIN PAIN WITH IT AND WANTS HER THROAT TO REST AND
IS NOT WANTING LUNCH BUT WAS ABLE TO DRINK A FULL 120CC OF APPLE JUICE. ORAL
CARE PROVIDED AND PROVIDED PT WITH LIP MOISTURIZOR AS HER LIPS WERE DRY AND
CRACKING. PT VOICED THANKS AND IS VISITING WITH HER  AT BEDSIDE. CL IN
REACH, BED IN LOWEST, SIDE RAILS X2. WILL CTM.

## 2019-01-09 NOTE — NUR
ASSISTED PT WITH REPOSITIONING IN BED. PT STATES SHE IS NOW COMFORTABLE. PT IS
A&O NO LONGER THINKING A DOG IS AROUND. SHE STATES SHE KNOWS SHE IS IN
HOSPITAL AND SITUATION. PT IS NEEDING TO EAT DINNER BUT STATES SHE STILL IS
NOT REALLY HUNGRY BUT WILL TRY. PT WAS COOPERATIVE AND ATE ABOUT 5 BITES AND
DRANK SOME OF HER SWEET TEA. WILL CONTINUE TO ENCOURAGE PTS INTAKE HOWEVER HER
THROAT IS STILL SLIGHTLY SORE FROM THE NGT. PT NOW RESTING QUIETLY IN BED.
DENIES ANY CURRENT PAIN OR NEEDS AT THIS TIME. WILL CTM.

## 2019-01-09 NOTE — NUR
PT ON CONTINOUS FEEDING VIA ND TUBE. PT IS LATHARGIC AND CONFUSED. PT
RESPIRATIONS EVEN AND UNLABORED. PT BED LOW CALL LIGHT WITHIN REACH WILL
CONTINUE TO MONITOR.

## 2019-01-09 NOTE — NUR
AM ROUNDS COMPLETED. SHIFT ASSESSMENT COMPLETED. INTRODUCED MYSELF TO PT AS
PRIMARY RN FOR TODAYS SHIFT. PT IS A&O SITTING UP IN BED RESTING QUIETLY. PT
IS VERY UNCOMFORTABLE IN BED. REPOSITIONED AND SHE WAS SOILED WITH URINE.
CLEANED PT UP AND COMPLETE BED CHANGE PROVIDED. PULLED PT UP AND SHE C/O PAIN
ALL OVER BUT IS NOW COMFORTABLE. PTS BILAT HEELS ARE BOGGY AND I PLACED IN
BOOTIES FOR COMFORT AND TO PROTECT HEELS. PLACED PILLOW UNDER BILAT ARMS FOR
COMFORT AND R.SIDE OF NECK AS IT APPEARS TO STAY KINKED TO R.SIDE. PT HAS A
NGT TO L.NARE WITH OSMOLITE AT 65ML/HR INFUSING  ROUNDED AND WANTS IT
CHANGED TO RENAL FRIENDLY, WILL PASS ONTO DIETICIAN. PT IS ABLE TO SWALLOW PER
SPEECH LAST NOTE YESTERDAY AND SHE CAN HAVE NGT OUT IF ABLE TO INCREASE
APPETITE. PT C/O PAIN IN THROAT FROM NGT AND INQUIRING HOW CAN SHE BE HUNGRY
IF WE ARE FEEDING HER THROUGH THE TUBE. WILL DISCUSS WITH PRIMARY AND SEE
ABOUT REMOVING IT AND SEEING IF SHE CAN TOLERATE ORAL DIET. PT VOICED THANKS
AND IS RESTING QUIETLY IN BED. DENIES ANY IMMEDIATE NEEDS AT THIS TIME. CL IN
REACH, BED IN LOWEST, SIDE RAILS X2. POSEY PAD IN PLACE AND AIR MATTRESS
INFLATED. WILL CTM.

## 2019-01-10 VITALS — DIASTOLIC BLOOD PRESSURE: 74 MMHG | SYSTOLIC BLOOD PRESSURE: 168 MMHG

## 2019-01-10 VITALS — SYSTOLIC BLOOD PRESSURE: 137 MMHG | DIASTOLIC BLOOD PRESSURE: 64 MMHG

## 2019-01-10 VITALS — DIASTOLIC BLOOD PRESSURE: 62 MMHG | SYSTOLIC BLOOD PRESSURE: 125 MMHG

## 2019-01-10 VITALS — DIASTOLIC BLOOD PRESSURE: 50 MMHG | SYSTOLIC BLOOD PRESSURE: 121 MMHG

## 2019-01-10 VITALS — DIASTOLIC BLOOD PRESSURE: 61 MMHG | SYSTOLIC BLOOD PRESSURE: 146 MMHG

## 2019-01-10 VITALS — SYSTOLIC BLOOD PRESSURE: 176 MMHG | DIASTOLIC BLOOD PRESSURE: 57 MMHG

## 2019-01-10 LAB
ALBUMIN SERPL-MCNC: 1.7 G/DL (ref 3.4–5)
ALP SERPL-CCNC: 125 U/L (ref 46–116)
ALT SERPL-CCNC: 29 U/L (ref 10–68)
ANION GAP SERPL CALC-SCNC: 12.8 MMOL/L (ref 8–16)
APPEARANCE UR: (no result)
BACTERIA #/AREA URNS HPF: (no result) /HPF
BILIRUB SERPL-MCNC: 0.36 MG/DL (ref 0.2–1.3)
BILIRUB SERPL-MCNC: NEGATIVE MG/DL
BUN SERPL-MCNC: 30 MG/DL (ref 7–18)
CALCIUM SERPL-MCNC: 8.4 MG/DL (ref 8.5–10.1)
CHLORIDE SERPL-SCNC: 106 MMOL/L (ref 98–107)
CO2 SERPL-SCNC: 25.4 MMOL/L (ref 21–32)
COLOR UR: YELLOW
CREAT SERPL-MCNC: 1.3 MG/DL (ref 0.6–1.3)
ERYTHROCYTE [DISTWIDTH] IN BLOOD BY AUTOMATED COUNT: 16.2 % (ref 11.5–14.5)
GLOBULIN SER-MCNC: 3.8 G/L
GLUCOSE SERPL-MCNC: 81 MG/DL (ref 74–106)
GLUCOSE SERPL-MCNC: NEGATIVE MG/DL
GRAN CASTS #/AREA URNS LPF: (no result) /LPF
HCT VFR BLD CALC: 26.8 % (ref 36–48)
HGB BLD-MCNC: 8.9 G/DL (ref 12–16)
HYALINE CASTS #/AREA URNS LPF: (no result) /LPF
KETONES UR STRIP-MCNC: NEGATIVE MG/DL
LYMPHOCYTES NFR BLD AUTO: 10.6 % (ref 15–50)
MAGNESIUM SERPL-MCNC: 2.1 MG/DL (ref 1.8–2.4)
MCH RBC QN AUTO: 32.5 PG (ref 26–34)
MCHC RBC AUTO-ENTMCNC: 33.2 G/DL (ref 31–37)
MCV RBC: 97.8 FL (ref 80–100)
MUCOUS THREADS #/AREA URNS LPF: (no result) /LPF
NEUTROPHILS NFR BLD AUTO: 77.7 % (ref 40–80)
NITRITE UR-MCNC: NEGATIVE MG/ML
OSMOLALITY SERPL CALC.SUM OF ELEC: 282 MOSM/KG (ref 275–300)
PH UR STRIP: 5 [PH] (ref 5–6)
PHOSPHATE SERPL-MCNC: 3.5 MG/DL (ref 2.5–4.9)
PLATELET # BLD: 405 10X3/UL (ref 130–400)
PMV BLD AUTO: 9.2 FL (ref 7.4–10.4)
POTASSIUM SERPL-SCNC: 5.2 MMOL/L (ref 3.5–5.1)
PROT SERPL-MCNC: 5.5 G/DL (ref 6.4–8.2)
PROT UR-MCNC: (no result) MG/DL
RBC # BLD AUTO: 2.74 10X6/UL (ref 4–5.4)
RBC #/AREA URNS HPF: (no result) /HPF (ref 0–5)
SODIUM SERPL-SCNC: 139 MMOL/L (ref 136–145)
SP GR UR STRIP: 1.01 (ref 1–1.02)
SQUAMOUS #/AREA URNS HPF: (no result) /HPF (ref 0–5)
UROBILINOGEN UR-MCNC: NORMAL MG/DL
WBC # BLD AUTO: 9.3 10X3/UL (ref 4.8–10.8)
WBC #/AREA URNS HPF: >50 /HPF (ref 0–5)

## 2019-01-10 NOTE — NUR
PTS R.FA PIV INFILTRATED WHILE VANCOMYCIN WAS INFUSING AND PTS R.AC AREA IS
NOW SWOLLEN AND REDDENED. OFFERED HEAT/ICE AND WILL APPLY IF PT ALLOWS. D/C
WITH CATHETER TIP FULLY INTACT AND ATTEMPTED TO RESITE PT IN THE L.ARM X2
STICKS HOWEVER UNSUCCESSFUL. WILL PAGE VASCULAR NURSE AT THIS TIME TO TRY.

## 2019-01-10 NOTE — NUR
PT FINALLY AGREED ON EATING SOMETHING AND STATES SHE LIKES SALAD. PROVIDED PT
WITH SALAD AND FELISA AUSTIN AS REQUESTED AND ASSISTED PT AND SHE ATE 50% PT
VOICED THANKS AND STATES IT WAS VERY GOOD. ORAL CARE PROVIDED AND PROVIDED
MOISTURIZOR TO LIPS AS THEY ARE STILL DRY. PT STILL C/O SORE THROAT FROM NGT.
NO CURRENT NEEDS. WILL CTM.

## 2019-01-10 NOTE — NUR
IN/OUT PERFORMED TO COLLECT UA AS ORDERED. UPON PERFORMING IT PROVIDED MARCUS
CARE AND COLLECTED IT AND SENT TO LAB AS ORDERED. PT CONTINUED TO HAVE URINE
DRAINAGE SO I LEFT IN PLACE TO COMPLETELY DRAIN AND PT HAD 700ML OF YELLOW
CLOUDY URINE OUT. REMOVED IN/OUT CATH AND MAY HAVE TO PLACE ALEXANDER AS PT DIDNT
REALIZE SHE HAD RETENTION. PT ALSO WAS JUST NOW WET AND REQUIRED COMPLETE
LINEN CHANGE R/T ALL HER URINE. CLEANED PT UP AND REPOSITIONED IN BED FOR
COMFORT.  NOW AT BEDSIDE. NO CURRENT NEEDS. WILL CTM.

## 2019-01-10 NOTE — NUR
PT RESTING COMFORTABLY IN BED. RESPIRATIONS EVEN AND UNLABORED. BED LOW CALL
LIGHT WITHIN REACH. WILL CONTINUE TO MONITOR

## 2019-01-10 NOTE — NUR
PT RESTING QUIETLY IN BED. NO S/S OF DISTRESS, NO PAIN OR DISCOMFORT NOTED.
REPOSITIONED PT AND PULLED UP IN BED. PT STATES SHE FEELS BETTER FOR NOW.
ADMINISTERED PM MEDS WITH APPLE SAUCE AND PT HAD SEVERAL SIPS OF NEPHRO DRINK.
TOLERATED WELL. VSS. WCTM AND FOLLOW POC. SR UP X2, BED ALARM ON, CL IN REACH,
BED IN LOW POSITION.

## 2019-01-10 NOTE — NUR
PT REMAINS WITHOUT IV ACCESS AS VASCULAR NURSE WAS UNABLE AND UNABLE TO EVEN
PLACE A MIDLINE. WILL DISCUSS WITH PRIMARY.

## 2019-01-10 NOTE — NUR
PT RECIEVED BATH FROM TERENCE GOMES. PT COMPLAING OF PAIN. PRN PAIN MEDS
ADMINSTERED. BED LOW CALL LIGHT WITHIN REACH. WILL CONTINUE TO MONITOR.

## 2019-01-10 NOTE — NUR
VASCULAR NURSE UNABLE TO PLACE PIV AND RECCOMENDS MIDLINE. NOTIFIED PRIMARY
AND PT WILL HAVE ONE PLACED.

## 2019-01-11 VITALS — SYSTOLIC BLOOD PRESSURE: 190 MMHG | DIASTOLIC BLOOD PRESSURE: 79 MMHG

## 2019-01-11 VITALS — SYSTOLIC BLOOD PRESSURE: 158 MMHG | DIASTOLIC BLOOD PRESSURE: 83 MMHG

## 2019-01-11 VITALS — SYSTOLIC BLOOD PRESSURE: 151 MMHG | DIASTOLIC BLOOD PRESSURE: 76 MMHG

## 2019-01-11 VITALS — DIASTOLIC BLOOD PRESSURE: 69 MMHG | SYSTOLIC BLOOD PRESSURE: 108 MMHG

## 2019-01-11 VITALS — SYSTOLIC BLOOD PRESSURE: 112 MMHG | DIASTOLIC BLOOD PRESSURE: 62 MMHG

## 2019-01-11 VITALS — SYSTOLIC BLOOD PRESSURE: 115 MMHG | DIASTOLIC BLOOD PRESSURE: 72 MMHG

## 2019-01-11 LAB
ALBUMIN SERPL-MCNC: 1.9 G/DL (ref 3.4–5)
ALP SERPL-CCNC: 143 U/L (ref 46–116)
ALT SERPL-CCNC: 26 U/L (ref 10–68)
ANION GAP SERPL CALC-SCNC: 17.4 MMOL/L (ref 8–16)
BASOPHILS NFR BLD AUTO: 0.9 % (ref 0–2)
BILIRUB SERPL-MCNC: 0.44 MG/DL (ref 0.2–1.3)
BUN SERPL-MCNC: 32 MG/DL (ref 7–18)
CALCIUM SERPL-MCNC: 8.3 MG/DL (ref 8.5–10.1)
CHLORIDE SERPL-SCNC: 107 MMOL/L (ref 98–107)
CO2 SERPL-SCNC: 21.5 MMOL/L (ref 21–32)
CREAT SERPL-MCNC: 1.4 MG/DL (ref 0.6–1.3)
EOSINOPHIL NFR BLD: 1.6 % (ref 0–7)
ERYTHROCYTE [DISTWIDTH] IN BLOOD BY AUTOMATED COUNT: 16.7 % (ref 11.5–14.5)
GLOBULIN SER-MCNC: 4.1 G/L
GLUCOSE SERPL-MCNC: 97 MG/DL (ref 74–106)
HCT VFR BLD CALC: 32.4 % (ref 36–48)
HGB BLD-MCNC: 10.6 G/DL (ref 12–16)
IMM GRANULOCYTES NFR BLD: 1.6 % (ref 0–5)
LYMPHOCYTES NFR BLD AUTO: 9.6 % (ref 15–50)
MAGNESIUM SERPL-MCNC: 2.1 MG/DL (ref 1.8–2.4)
MCH RBC QN AUTO: 32.2 PG (ref 26–34)
MCHC RBC AUTO-ENTMCNC: 32.7 G/DL (ref 31–37)
MCV RBC: 98.5 FL (ref 80–100)
MONOCYTES NFR BLD: 11.5 % (ref 2–11)
NEUTROPHILS NFR BLD AUTO: 74.8 % (ref 40–80)
OSMOLALITY SERPL CALC.SUM OF ELEC: 287 MOSM/KG (ref 275–300)
PHOSPHATE SERPL-MCNC: 4.2 MG/DL (ref 2.5–4.9)
PLATELET # BLD: 324 10X3/UL (ref 130–400)
PMV BLD AUTO: 10.9 FL (ref 7.4–10.4)
POTASSIUM SERPL-SCNC: 4.9 MMOL/L (ref 3.5–5.1)
PROT SERPL-MCNC: 6 G/DL (ref 6.4–8.2)
RBC # BLD AUTO: 3.29 10X6/UL (ref 4–5.4)
SODIUM SERPL-SCNC: 141 MMOL/L (ref 136–145)
WBC # BLD AUTO: 10.3 10X3/UL (ref 4.8–10.8)

## 2019-01-11 NOTE — NUR
RECIEVED BEDSIDE REPORT. AM ROUNDS COMPLETED. PT IN BED WITH BOTH EYES OPEN.
VSS, PT AAO X2, BREATHING UNLABORED. PT HAD ONE BM THIS AM, PT DENIES NEEDS
FOR PAIN AT THIS TIME. WILL CONTINUE POC. CL IN REACH BED IN LOW.

## 2019-01-11 NOTE — NUR
PT A/O WITH CONFUSION SPELLS. PT REPORTS HER NEPHRO DRINK "MAKES ME WANNA
THROW UP." ENCOURAGED SIPS OF WATER AND BITES OF APPLE SAUCE, PT HAD BOTH.
TOLERATED WELL. LINENS CLEANED AND PT CHANGED WITH BARRIER CREAM APPLIE PER
CNA. WILL CONTINUE TO ASSESS. SR UP X2, CL IN REACH, BED ALARM ON, BED IN
LOWEST POSITION. NO PAIN OR DISCOMFORT NOTED AT THIS TIME.

## 2019-01-11 NOTE — NUR
Nutrition follow-up:
NGT removed
Diet: Renal mechanical soft as tolerated with Nepro TID
PO intake ~25% of meals; pt sipping Nepro
Labs reviewed
+BM
Wt: 140#
Will need nutrition support restarted if PO intake does not imvprove.
May need to consider PEG tube placement.
RDN  following.

## 2019-01-11 NOTE — NUR
INITIAL ROUNDS COMPLETED - PT ALERT BUT DISORIENTED TO PLACE AND SITUATION.
REPEATEDLY ASKS "WHY IS THAT DOG BARKING." REORIENTED THAT THERE WAS NO DOG IN
THE ROOM. CNA IN TO GET VITALS. PT DENIES NEEDS AT THIS TIME. WCTM AND FOLLOW
POC. SR UP X2, CL IN REACH, BED ALARM ON.

## 2019-01-11 NOTE — NUR
PT PERIPHERAL IV ON RIGHT HAND INFILTRATED. REMOVED THE INFILTRATED IV,
COVERED WITH 2X2 AND TAPE. CONSULT VASCULAR ACCESS. PT CURRENTLY RESTING IN
BED WITH EYES CLOSE. CL IN REACH BED IN LOW, WILL CTM.

## 2019-01-11 NOTE — NUR
CVL PLACED IN PT, PT TOLERATED WELL, CONSULT RADIOLOGY FOR PLACEMENT
VERIFICATION. PT CURRENTLY RESTING IN BED. PT DENIES NEEDS FOR PAIN AT THIS
TIME. PT STATES SHE WILL LIKE TO REST. WILL CTM. CL IN REACH, BED IN LOW.

## 2019-01-12 VITALS — SYSTOLIC BLOOD PRESSURE: 146 MMHG | DIASTOLIC BLOOD PRESSURE: 55 MMHG

## 2019-01-12 VITALS — DIASTOLIC BLOOD PRESSURE: 57 MMHG | SYSTOLIC BLOOD PRESSURE: 152 MMHG

## 2019-01-12 VITALS — SYSTOLIC BLOOD PRESSURE: 146 MMHG | DIASTOLIC BLOOD PRESSURE: 54 MMHG

## 2019-01-12 VITALS — SYSTOLIC BLOOD PRESSURE: 157 MMHG | DIASTOLIC BLOOD PRESSURE: 69 MMHG

## 2019-01-12 LAB
ALBUMIN SERPL-MCNC: 1.9 G/DL (ref 3.4–5)
ALP SERPL-CCNC: 137 U/L (ref 46–116)
ALT SERPL-CCNC: 23 U/L (ref 10–68)
ANION GAP SERPL CALC-SCNC: 15.8 MMOL/L (ref 8–16)
BASOPHILS NFR BLD AUTO: 0.3 % (ref 0–2)
BILIRUB SERPL-MCNC: 0.43 MG/DL (ref 0.2–1.3)
BUN SERPL-MCNC: 30 MG/DL (ref 7–18)
CALCIUM SERPL-MCNC: 8.1 MG/DL (ref 8.5–10.1)
CHLORIDE SERPL-SCNC: 108 MMOL/L (ref 98–107)
CO2 SERPL-SCNC: 24 MMOL/L (ref 21–32)
CREAT SERPL-MCNC: 1.4 MG/DL (ref 0.6–1.3)
EOSINOPHIL NFR BLD: 0.6 % (ref 0–7)
ERYTHROCYTE [DISTWIDTH] IN BLOOD BY AUTOMATED COUNT: 16.8 % (ref 11.5–14.5)
GLOBULIN SER-MCNC: 3.8 G/L
GLUCOSE SERPL-MCNC: 97 MG/DL (ref 74–106)
HCT VFR BLD CALC: 28.3 % (ref 36–48)
HGB BLD-MCNC: 9.1 G/DL (ref 12–16)
IMM GRANULOCYTES NFR BLD: 0.5 % (ref 0–5)
LYMPHOCYTES NFR BLD AUTO: 10.3 % (ref 15–50)
MAGNESIUM SERPL-MCNC: 2.1 MG/DL (ref 1.8–2.4)
MCH RBC QN AUTO: 31.8 PG (ref 26–34)
MCHC RBC AUTO-ENTMCNC: 32.2 G/DL (ref 31–37)
MCV RBC: 99 FL (ref 80–100)
MONOCYTES NFR BLD: 10.4 % (ref 2–11)
NEUTROPHILS NFR BLD AUTO: 77.9 % (ref 40–80)
OSMOLALITY SERPL CALC.SUM OF ELEC: 292 MOSM/KG (ref 275–300)
PHOSPHATE SERPL-MCNC: 3.7 MG/DL (ref 2.5–4.9)
PLATELET # BLD: 487 10X3/UL (ref 130–400)
PMV BLD AUTO: 10 FL (ref 7.4–10.4)
POTASSIUM SERPL-SCNC: 3.8 MMOL/L (ref 3.5–5.1)
PROT SERPL-MCNC: 5.7 G/DL (ref 6.4–8.2)
RBC # BLD AUTO: 2.86 10X6/UL (ref 4–5.4)
SODIUM SERPL-SCNC: 144 MMOL/L (ref 136–145)
WBC # BLD AUTO: 10.1 10X3/UL (ref 4.8–10.8)

## 2019-01-12 NOTE — NUR
LATE ENTRY FOR 0900. REC'D PT WITH CLEAN PAD AND BM IN BETWEEN BUTTOCKS AND IN
MARCUS AREA. CLEANED PT MARCUS AREA AND IN BETWEEN BUTTOCKS. REPOSITIONED PT AND
TURNED TO L SIDE AND OFFLOADED WITH A PILLOW TO R SIDE. NO S/S OF ACUTE
DISTRESS. CL IN PLACE.

## 2019-01-12 NOTE — NUR
PT RESTING IN BED WITH EYES CLOSED. CHEST RISING AND FALLING. NO S/S OF ACUTE
DISTRESS. CL IN PLACE.

## 2019-01-12 NOTE — NUR
ATTEMPTED TO GET PT TO EAT DINNER. PT ATE X4 BITES OF CHICKEN AND DUMPLINGS
AND DRANK 1/8 OF MILK SHAKE. PT STATES, "NO MORE". REPOSITIONED PT WITH X2
PILLOWS. PT TENDS TO LEAN TOWARD R SIDE. NO S/S OF ACUTE DISTRESS. PINK HEEL
PROTECTORS ON BLE. CL IN PLACE.

## 2019-01-12 NOTE — NUR
RECEIVED REPORT, WILL ASSUME CARE OF PT, PT IS SLEEPING, BED IS LOW, SRX2,
CALL LIGHT IN REACH, WILL CONTINUE PLAN OF CARE

## 2019-01-12 NOTE — NUR
IN TO DRAW BLOOD FROM CENTRAL LINE. GREAT BLOOD RETURN NOTED FROM BOTH PORTS,
AM LAB OBTAINED FROM PROXIMAL PORT. SENT TO LAB. READJUSTED PT IN BED, PT
CLEAN AND DRY. NO PAIN OR DISCOMFORT NOTED. DENIES FURTHER NEEDS. WCTM. SR UP
X2, CL IN REACH, BED ALARM ON AND FUNCTIONING PROPERLY.

## 2019-01-12 NOTE — NUR
LATE ENTRY 0900. CLEANED PT. REPOSITIONED PT. OFFLOADED HEELS. APPLIED
 
BEAUDREAUS TO BOTTOM. NO S/S OF ACUTE DISTRESS. (1030) FAMILY IN AT BEDSIDE.
UPSET AND WANTING TO KNOW IF THE PT WAS STILL RECIEVING ABT. EXPLINED TO
FAMILY PT WAS ON VANC AND 1 OF 7 HAVE BEEN GIVEN AND IT LOOKS AS IF SHE WILL
HAVE 6 MORE DOSES UNLESS MD CHANGES. DID EXPLAIN WE WOULD NOT RECOLLECT UA/CNS
UNTIL PT WAS COMPLETE WITH ABT. PT UNDERSTOOD. PT COMPLAINED ABOUT THE CARE
AND ASKED WHO THE MANAGER WAS. TOLD THE FAMILY THEY COULD SPEAK WITH BRIDGET WITH
ANY CONCERNS. PT UP IN CHAIR. REPOSITIONED PT. PINK HEEL PROTECTORS PLACED ON
BOTH FEET. (1200) FAMILY AT BEDSIDE ATTEMPTING TO FEED PT. POOR APPETITE
NOTED. PT REFUSING TO EAT. NO S/S OF ACUTE DISTRESS. CL IN PLACE.

## 2019-01-13 VITALS — DIASTOLIC BLOOD PRESSURE: 77 MMHG | SYSTOLIC BLOOD PRESSURE: 159 MMHG

## 2019-01-13 VITALS — DIASTOLIC BLOOD PRESSURE: 56 MMHG | SYSTOLIC BLOOD PRESSURE: 133 MMHG

## 2019-01-13 VITALS — DIASTOLIC BLOOD PRESSURE: 86 MMHG | SYSTOLIC BLOOD PRESSURE: 153 MMHG

## 2019-01-13 VITALS — SYSTOLIC BLOOD PRESSURE: 137 MMHG | DIASTOLIC BLOOD PRESSURE: 45 MMHG

## 2019-01-13 VITALS — SYSTOLIC BLOOD PRESSURE: 142 MMHG | DIASTOLIC BLOOD PRESSURE: 111 MMHG

## 2019-01-13 VITALS — SYSTOLIC BLOOD PRESSURE: 146 MMHG | DIASTOLIC BLOOD PRESSURE: 53 MMHG

## 2019-01-13 LAB
ALBUMIN SERPL-MCNC: 1.9 G/DL (ref 3.4–5)
ALP SERPL-CCNC: 127 U/L (ref 46–116)
ALT SERPL-CCNC: 22 U/L (ref 10–68)
ANION GAP SERPL CALC-SCNC: 12.2 MMOL/L (ref 8–16)
BASOPHILS NFR BLD AUTO: 0.2 % (ref 0–2)
BILIRUB SERPL-MCNC: 0.32 MG/DL (ref 0.2–1.3)
BUN SERPL-MCNC: 27 MG/DL (ref 7–18)
CALCIUM SERPL-MCNC: 8.1 MG/DL (ref 8.5–10.1)
CHLORIDE SERPL-SCNC: 112 MMOL/L (ref 98–107)
CO2 SERPL-SCNC: 26.5 MMOL/L (ref 21–32)
CREAT SERPL-MCNC: 1.4 MG/DL (ref 0.6–1.3)
EOSINOPHIL NFR BLD: 1.3 % (ref 0–7)
ERYTHROCYTE [DISTWIDTH] IN BLOOD BY AUTOMATED COUNT: 16.9 % (ref 11.5–14.5)
GLOBULIN SER-MCNC: 3.3 G/L
GLUCOSE SERPL-MCNC: 101 MG/DL (ref 74–106)
HCT VFR BLD CALC: 26.9 % (ref 36–48)
HGB BLD-MCNC: 8.6 G/DL (ref 12–16)
IMM GRANULOCYTES NFR BLD: 0.6 % (ref 0–5)
LYMPHOCYTES NFR BLD AUTO: 12.9 % (ref 15–50)
MAGNESIUM SERPL-MCNC: 2.2 MG/DL (ref 1.8–2.4)
MCH RBC QN AUTO: 32 PG (ref 26–34)
MCHC RBC AUTO-ENTMCNC: 32 G/DL (ref 31–37)
MCV RBC: 100 FL (ref 80–100)
MONOCYTES NFR BLD: 8.6 % (ref 2–11)
NEUTROPHILS NFR BLD AUTO: 76.4 % (ref 40–80)
OSMOLALITY SERPL CALC.SUM OF ELEC: 296 MOSM/KG (ref 275–300)
PLATELET # BLD: 420 10X3/UL (ref 130–400)
PMV BLD AUTO: 9.7 FL (ref 7.4–10.4)
POTASSIUM SERPL-SCNC: 3.7 MMOL/L (ref 3.5–5.1)
PROT SERPL-MCNC: 5.2 G/DL (ref 6.4–8.2)
RBC # BLD AUTO: 2.69 10X6/UL (ref 4–5.4)
SODIUM SERPL-SCNC: 147 MMOL/L (ref 136–145)
WBC # BLD AUTO: 8.8 10X3/UL (ref 4.8–10.8)

## 2019-01-13 NOTE — NUR
PT RESTING IN BED. CNA CLEANED, TURNED PT. CHEST RISING AND FALLING. NO S/S OF
ACUTE DISTRESS. CL IN PLACE.

## 2019-01-13 NOTE — NUR
LATE ENTRY FOR 0930. CLEANED PT MARCUS AREA WITH SOAP AND WATER. BEAUDREAUS
APPLIOED. IN BETWEEN BUTTOCK SLIGHTLY PINK. NO OPEN AREAS NOTED. MEPLILEX
APPLIED TO R HEEL. REDDENED GOLF BALL SIZE AREA ON HEEL NOTED. MEPLILEX
CHANGED TO UPPER MIDDLE BACK. CENTRAL LINE DRESSING CHANGED. OFFLOADED WITH A
PILLOW TO L SIDE. R ARM ELEVATED. BRUSHED HAIR. PT DRANK A CARTON OF APPLE
JUICE AND ATE 1 PUDDING. REFUSED BREAKFAST. " I DO NOT WANT ANYMORE" PT
STATED. GAVE PT SOME COFFEE. DRANK A FEW SIPS. NO S/S OF ACUTE DISTRESS. NO
FAMILY AT BEDSIDE. CL IN PLACE.

## 2019-01-13 NOTE — NUR
RECEIVED REPORT, WILL ASSUME CARE OF PT, PT IS SLEEPING, NO DISTRESS NOTICED
AT THIS TIME, BED IS LOW, SRX2, ALARM IS ON, CALL LIGHT IN REACH, WILL
CONTINUE PLAN OF CARE

## 2019-01-13 NOTE — NUR
PT STATES, "SHE THINKS SHE HAS HAS A STROKE A WEEK AGO AND THAT IS WHY SHE IS
HERE" FAMILY STATED, "PT HAS BEEN TELLING US THAT". NO ACUTE CHANGES NOTED
FROM YESTERDAY. CVL DRESSING WET. CALLED BAM TO SEE IF IT WAS SAFE TO USE AND
TO REPORT WHAT THE PT HAD STATED OF HAVING A CVA.
.
XRAY CONFIRMED THE PLACEMENT OF THE CVL AND IT SHOWED
IT TO BE IN THE VEIN. CALLED RUDI WHO STATED, "IT WOULD BE OK TO
USE". FAMILY AT BEDSIDE NO S/S OF ACUTE DISTRESS. CL IN PLACE.

## 2019-01-13 NOTE — NUR
LATE ENTRY 1700 CHANGED CVL DRESSING DT DRESSING BEING SATURATED WITH CLEAR
LIQUID. NO S/S OF ACUTE DISTRESS. CL IN PLACE.

## 2019-01-13 NOTE — NUR
PT RESTING IN BED. HOB ELEVATED 45. CVL DRSNG WET AND REPORTED TO EM FROM
PREVIOUS SHIFT TO BE WET AND CHANGED. CHEST X RAY ORDERED. CVL IN PLACE.
PT DRINKING APPLE JUICE AND COFFEE FOR ME THIS AM. NO
S/S OF ACUTE DISTRESS. CL IN PLACE.

## 2019-01-14 VITALS — SYSTOLIC BLOOD PRESSURE: 128 MMHG | DIASTOLIC BLOOD PRESSURE: 48 MMHG

## 2019-01-14 VITALS — DIASTOLIC BLOOD PRESSURE: 77 MMHG | SYSTOLIC BLOOD PRESSURE: 136 MMHG

## 2019-01-14 VITALS — SYSTOLIC BLOOD PRESSURE: 132 MMHG | DIASTOLIC BLOOD PRESSURE: 55 MMHG

## 2019-01-14 VITALS — SYSTOLIC BLOOD PRESSURE: 108 MMHG | DIASTOLIC BLOOD PRESSURE: 69 MMHG

## 2019-01-14 VITALS — DIASTOLIC BLOOD PRESSURE: 60 MMHG | SYSTOLIC BLOOD PRESSURE: 132 MMHG

## 2019-01-14 LAB
ANION GAP SERPL CALC-SCNC: 10.2 MMOL/L (ref 8–16)
BASOPHILS NFR BLD AUTO: 0.3 % (ref 0–2)
BUN SERPL-MCNC: 22 MG/DL (ref 7–18)
CALCIUM SERPL-MCNC: 7.9 MG/DL (ref 8.5–10.1)
CHLORIDE SERPL-SCNC: 111 MMOL/L (ref 98–107)
CO2 SERPL-SCNC: 27.2 MMOL/L (ref 21–32)
CREAT SERPL-MCNC: 1.3 MG/DL (ref 0.6–1.3)
EOSINOPHIL NFR BLD: 1.6 % (ref 0–7)
ERYTHROCYTE [DISTWIDTH] IN BLOOD BY AUTOMATED COUNT: 16.9 % (ref 11.5–14.5)
GLUCOSE SERPL-MCNC: 78 MG/DL (ref 74–106)
HCT VFR BLD CALC: 27.1 % (ref 36–48)
HGB BLD-MCNC: 8.5 G/DL (ref 12–16)
IMM GRANULOCYTES NFR BLD: 0.6 % (ref 0–5)
LYMPHOCYTES NFR BLD AUTO: 13.6 % (ref 15–50)
MAGNESIUM SERPL-MCNC: 2 MG/DL (ref 1.8–2.4)
MCH RBC QN AUTO: 31.5 PG (ref 26–34)
MCHC RBC AUTO-ENTMCNC: 31.4 G/DL (ref 31–37)
MCV RBC: 100.4 FL (ref 80–100)
MONOCYTES NFR BLD: 9.7 % (ref 2–11)
NEUTROPHILS NFR BLD AUTO: 74.2 % (ref 40–80)
OSMOLALITY SERPL CALC.SUM OF ELEC: 290 MOSM/KG (ref 275–300)
PLATELET # BLD: 442 10X3/UL (ref 130–400)
PMV BLD AUTO: 9.8 FL (ref 7.4–10.4)
POTASSIUM SERPL-SCNC: 3.4 MMOL/L (ref 3.5–5.1)
RBC # BLD AUTO: 2.7 10X6/UL (ref 4–5.4)
SODIUM SERPL-SCNC: 145 MMOL/L (ref 136–145)
WBC # BLD AUTO: 8.9 10X3/UL (ref 4.8–10.8)

## 2019-01-14 NOTE — NUR
Right heel blister is ruptured and shows signs of healing. No drainage or odor
noted.  Area measures 2cm x 2cm. Cleansed and protected with mepilex border.
Right great toe (medial) continues to be red. No drainage or odor noted. Pt
states it is not tender anymore.
Left heel has a 1cm x 1cm nonblanchable area. (stage 1 pressure injury).
Covered with mepilex border to protect.
Recommend:
-continue bridging heels off of the mattress and pillow.
-continue turn/repositioning q 2 hours
-continue air overlay mattress
-continue pericare as needed for incontinence and use calmoseptine cream
Wound care continues to monitor

## 2019-01-14 NOTE — NUR
Nutrition follow-up:
Visited with family; upset due to pt not eating and NGT out.  RDN discussed pt
wanted tube out; however, family may want to consider placing a PEG tube for
nutrition and hydration due to poor po intake.
RDN changed diet order to regular mechanical soft from renal to encourage
increased po intake.
Labs reviewed
Wt: 138#
Will discuss pt with PCP re: PEG tube placement per family wishes.
RDN following.

## 2019-01-14 NOTE — NUR
AM MEDICATIONS ADMINISTERED. TERENCE DREW CURRENTLY FEEDING PT BREAKFAST. PT
DENIES ANY NEEDS. WILL CTM.

## 2019-01-14 NOTE — NUR
RECEIVED REPORT,WILL ASSUME CARE OF PT, DENIES ANY NEEDS, BED IS LOW, SRX2.
CALL LIGHT IN REACH, WILL CONTINUE PLAN OF CARE

## 2019-01-14 NOTE — NUR
REPORT RECEIVED. WILL CONTINUE WITH POC. PT CURRENTLY LYING SUPINE. CALL LIGHT
W/I REACH. PT IS ON A FIRST STEP OVERLAY. PT IS RESTING AT THE MOMENT. RR EVEN
AND UNLABORED ON RA. R.SUB TRIALYSIS IS SALINE LOCKED. WILL CTM.

## 2019-01-15 VITALS — SYSTOLIC BLOOD PRESSURE: 181 MMHG | DIASTOLIC BLOOD PRESSURE: 90 MMHG

## 2019-01-15 VITALS — SYSTOLIC BLOOD PRESSURE: 169 MMHG | DIASTOLIC BLOOD PRESSURE: 84 MMHG

## 2019-01-15 VITALS — DIASTOLIC BLOOD PRESSURE: 89 MMHG | SYSTOLIC BLOOD PRESSURE: 173 MMHG

## 2019-01-15 VITALS — DIASTOLIC BLOOD PRESSURE: 74 MMHG | SYSTOLIC BLOOD PRESSURE: 130 MMHG

## 2019-01-15 VITALS — DIASTOLIC BLOOD PRESSURE: 82 MMHG | SYSTOLIC BLOOD PRESSURE: 142 MMHG

## 2019-01-15 VITALS — SYSTOLIC BLOOD PRESSURE: 182 MMHG | DIASTOLIC BLOOD PRESSURE: 69 MMHG

## 2019-01-15 LAB
ANION GAP SERPL CALC-SCNC: 11.7 MMOL/L (ref 8–16)
APPEARANCE UR: CLEAR
BASOPHILS NFR BLD AUTO: 0.4 % (ref 0–2)
BILIRUB SERPL-MCNC: NEGATIVE MG/DL
BUN SERPL-MCNC: 20 MG/DL (ref 7–18)
CALCIUM SERPL-MCNC: 8.1 MG/DL (ref 8.5–10.1)
CHLORIDE SERPL-SCNC: 112 MMOL/L (ref 98–107)
CO2 SERPL-SCNC: 25.2 MMOL/L (ref 21–32)
COLOR UR: YELLOW
CREAT SERPL-MCNC: 1.5 MG/DL (ref 0.6–1.3)
EOSINOPHIL NFR BLD: 1.2 % (ref 0–7)
ERYTHROCYTE [DISTWIDTH] IN BLOOD BY AUTOMATED COUNT: 16.9 % (ref 11.5–14.5)
GLUCOSE SERPL-MCNC: 90 MG/DL (ref 74–106)
GLUCOSE SERPL-MCNC: NEGATIVE MG/DL
HCT VFR BLD CALC: 28.4 % (ref 36–48)
HGB BLD-MCNC: 8.8 G/DL (ref 12–16)
IMM GRANULOCYTES NFR BLD: 0.8 % (ref 0–5)
KETONES UR STRIP-MCNC: NEGATIVE MG/DL
LYMPHOCYTES NFR BLD AUTO: 14 % (ref 15–50)
MAGNESIUM SERPL-MCNC: 2 MG/DL (ref 1.8–2.4)
MCH RBC QN AUTO: 31.4 PG (ref 26–34)
MCHC RBC AUTO-ENTMCNC: 31 G/DL (ref 31–37)
MCV RBC: 101.4 FL (ref 80–100)
MONOCYTES NFR BLD: 9.6 % (ref 2–11)
NEUTROPHILS NFR BLD AUTO: 74 % (ref 40–80)
NITRITE UR-MCNC: NEGATIVE MG/ML
OSMOLALITY SERPL CALC.SUM OF ELEC: 291 MOSM/KG (ref 275–300)
PH UR STRIP: 5 [PH] (ref 5–6)
PLATELET # BLD: 487 10X3/UL (ref 130–400)
PMV BLD AUTO: 10 FL (ref 7.4–10.4)
POTASSIUM SERPL-SCNC: 3.9 MMOL/L (ref 3.5–5.1)
PROT UR-MCNC: (no result) MG/DL
RBC # BLD AUTO: 2.8 10X6/UL (ref 4–5.4)
SODIUM SERPL-SCNC: 145 MMOL/L (ref 136–145)
SP GR UR STRIP: 1.01 (ref 1–1.02)
UROBILINOGEN UR-MCNC: NORMAL MG/DL
WBC # BLD AUTO: 9.9 10X3/UL (ref 4.8–10.8)

## 2019-01-15 NOTE — NUR
PT UP TO BEDSIDE CHAIR WANTING TO GET BACK IN BED HOWEVER SHE HAS ONLY BEEN UP
FOR ABOUT 30MINS SO I ENCOURAGED HER TO STAY IN THE CHAIR LONGER AND SHE
AGREED. PT DENIES ANY CURRENT NEEDS. FAMILY AT BEDSIDE. WILL CTM.

## 2019-01-15 NOTE — NUR
PT RESTING IN BED WITH EYES CLOSED RESPIRATIONS EVEN AND UNLABORED. BED LOW
CALL LIGHT WITHIN REACH. WILL CONTINUE TO MONITOR.

## 2019-01-15 NOTE — NUR
LYING IN BED, RESPIRATIONS EVEN AND UNLABORED.  CALL LIGHT IN REACH, WILL
CONTINUE WITH PLAN OF CARE.

## 2019-01-15 NOTE — NUR
CHANGED BILAT DRSGS ON PTS HEELS. L.HEEL PROTECTED AND SKIN INTACT. R.HEEL IS
A BLISTER THAT HAS POPPED OPEN. CURRENTLY SKIN STILL HANGING ON. CLEANSED WITH
ASEPTIC FOAM SOAP. PATTED DRY AND THEN APPLIED MEPILEX BACK IN PLACE. PT
SITTING UP IN BEDSIDE CHAIR RESTING WITH FAMILY AT BEDSIDE. NO CURRENT NEEDS.
WILL CTM.

## 2019-01-15 NOTE — EC
PATIENT:CHANTAL TIPTON                    DATE OF SERVICE: 01/02/19
SEX: F                                  MEDICAL RECORD: [id number]
DATE OF BIRTH: [birth year]                        LOCATION:D.M2      D.211
AGE OF PATIENT: 88                             ADMISSION DATE: 01/02/19
 
REFERRING PHYSICIAN:                               
 
INTERPRETING PHYSICIAN: BENNY LAUREANO MD             
 
 
 
                             ECHOCARDIOGRAM REPORT
  ECHO CHARGES 4               ECHO COMPLETE                 Date: 01/04/19
 
 
 
CLINICAL DIAGNOSIS: CHF/  HX OF MVR, MECHANICAL,  
                    AND PACEMAKER                 
                         ECHOCARDIOGRAPHIC MEASUREMENTS
      (adult normal given)
   AC root (d.<3.7cm) 3.8  cm   LV Septum d (<1.2 cm> 1.7  cm
      Valve Excursion 1.2  cm     LV Septum (systole) 1.8  cm
Left Atria (s.<4.0cm> 4.6  cm          LVPW d(<1.2cm) 1.4  cm
        RV (d.<2.3cm) 4.9  cm           LVPW (sytole) 1.7  cm
  LV diastole(<5.6CM) 3.7  cm       MV E-F(>70mm/sec)      cm
           LV systole 2.3  cm           LVOT Diameter 1.5  cm
       MV exc.(>10mm)      cm
Est.ejection fraction (50-75%)     %
 
   DOPPLER:
     LVIT      cm/sec A 28.0 cm/sec E 142   cm/sec
       LA      cm/sec      RVSP 63   mmHg
     LVOT      cm/sec   AOP1/2T      m/s
  Asc. Ao      cm/sec
     RVOT 90   cm/sec
       RA      cm/sec
         cm/sec
 AV Gradient Peak      mmHg  AV Mean      mmHg  AV Area 1.6  cm
 MV Gradient Peak 13.99mmHg  MV Mean 3.723mmHg  MV Area      cm
   COMMENTS:                                              
 
 
 Cardiac Sonographer: Kandy CLOUD              
      Cardiologist: 1          Dr. Laureano                
             TAPE# PACS           
                                       Pericardial Effusion N                        
 
 
DATE OF SERVICE:  01/04/2019
 
PROCEDURE:  Echocardiogram.
 
FINDINGS:
1.  Left ventricular chamber size is within normal limits.  Left ventricular
systolic function is normal.  Overall ejection fraction estimated at 55%.
2.  Left atrium is enlarged at 4.6 cm.  Right atrium and right ventricle chamber
sizes are as well moderately dilated.
3.  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
 
 
 
ECHOCARDIOGRAM REPORT                          [id number]    CHANTAL TIPTON            
 
 
with normal structure and function in this position.  The remaining valvular
structures have normal structure and motion.
4.  Doppler interrogation reveals mild mitral regurgitation, severe tricuspid
regurgitation, no other valvular insufficiency or stenosis.  Pulmonary systolic
pressure is estimated at 63 mmHg.
5.  No evidence of pericardial effusion or left ventricular thrombus.
 
TRANSINT:[id number] Voice Confirmation ID: [id number] DOCUMENT ID: [id number]
                                           
                                           BENNY LAUREANO MD             
 
 
 
Electronically Signed by BENNY LAUREANO on 01/15/19 at 1329
 
 
 
 
 
 
 
 
 
 
 
 
 
 
 
 
 
 
 
 
 
 
 
 
 
 
 
 
 
 
 
 
CC:                                                             [id number]
DICTATION DATE: 01/04/19 1256     :     01/04/19 1305      ADM IN  
                                                                              
[place] [address]

## 2019-01-15 NOTE — NUR
PT IS REFUSING LUNCH AT THIS TIME. STATES SHE WILL TRY WITH HER FAMILY WHEN
THEY RETURN. PT IS STILL NEEDING AN IN/OUT CATH FOR STERILE URINE SPECIMEN AND
VERBALIZED UNDERSTANDING AND WE WILL OBTAIN WHEN SHE GETS BACK INTO BED WITH
THERAPY. NO CURRENT NEEDS AT THIS TIME. CL IN REACH, BED IN LOWEST, SIDE RAILS
X2. WILL CTM.

## 2019-01-15 NOTE — NUR
PTS R.CHEST CVL DRSG IS SOILED WITH FLUID, UNSURE HOW OR WHERE FROM. STERILE
DRSG CHANGE COMPLETED AND NOW IS CLEAN DRY AND INTACT. BIOPATCH ADHERED TO
SKIN, FLUSHED ALL LUMENS OF THE TRIPLE LUMEN WITHOUT ANY RESISTANCE MET, SWAB
CAPS IN USE. WILL CTM.

## 2019-01-15 NOTE — NUR
16 FR ALEXANDER CATHETER PLACED AS ORDERED R/T RETENTION. STERILE UA COLLECTED AND
SENT TO LAB AS ORDERED. STAT LOCK SECURED TO R.INNER THIGH. 600CC OF CLEAR
YELLOW URINE IMMEDIATELY DRAINING.

## 2019-01-16 VITALS — SYSTOLIC BLOOD PRESSURE: 173 MMHG | DIASTOLIC BLOOD PRESSURE: 80 MMHG

## 2019-01-16 VITALS — SYSTOLIC BLOOD PRESSURE: 160 MMHG | DIASTOLIC BLOOD PRESSURE: 70 MMHG

## 2019-01-16 VITALS — SYSTOLIC BLOOD PRESSURE: 170 MMHG | DIASTOLIC BLOOD PRESSURE: 66 MMHG

## 2019-01-16 VITALS — DIASTOLIC BLOOD PRESSURE: 71 MMHG | SYSTOLIC BLOOD PRESSURE: 173 MMHG

## 2019-01-16 VITALS — DIASTOLIC BLOOD PRESSURE: 78 MMHG | SYSTOLIC BLOOD PRESSURE: 176 MMHG

## 2019-01-16 VITALS — SYSTOLIC BLOOD PRESSURE: 178 MMHG | DIASTOLIC BLOOD PRESSURE: 83 MMHG

## 2019-01-16 LAB
ALBUMIN SERPL-MCNC: 1.8 G/DL (ref 3.4–5)
ANION GAP SERPL CALC-SCNC: 12 MMOL/L (ref 8–16)
BASOPHILS NFR BLD AUTO: 0.5 % (ref 0–2)
BUN SERPL-MCNC: 19 MG/DL (ref 7–18)
CALCIUM SERPL-MCNC: 7.1 MG/DL (ref 8.5–10.1)
CHLORIDE SERPL-SCNC: 116 MMOL/L (ref 98–107)
CO2 SERPL-SCNC: 22.6 MMOL/L (ref 21–32)
CREAT SERPL-MCNC: 1.1 MG/DL (ref 0.6–1.3)
EOSINOPHIL NFR BLD: 1.4 % (ref 0–7)
ERYTHROCYTE [DISTWIDTH] IN BLOOD BY AUTOMATED COUNT: 17 % (ref 11.5–14.5)
GLUCOSE SERPL-MCNC: 81 MG/DL (ref 74–106)
HCT VFR BLD CALC: 24.8 % (ref 36–48)
HGB BLD-MCNC: 7.8 G/DL (ref 12–16)
IMM GRANULOCYTES NFR BLD: 0.8 % (ref 0–5)
LYMPHOCYTES NFR BLD AUTO: 12 % (ref 15–50)
MAGNESIUM SERPL-MCNC: 1.9 MG/DL (ref 1.8–2.4)
MCH RBC QN AUTO: 31.8 PG (ref 26–34)
MCHC RBC AUTO-ENTMCNC: 31.5 G/DL (ref 31–37)
MCV RBC: 101.2 FL (ref 80–100)
MONOCYTES NFR BLD: 7.9 % (ref 2–11)
NEUTROPHILS NFR BLD AUTO: 77.4 % (ref 40–80)
OSMOLALITY SERPL CALC.SUM OF ELEC: 292 MOSM/KG (ref 275–300)
PHOSPHATE SERPL-MCNC: 2.7 MG/DL (ref 2.5–4.9)
PLATELET # BLD: 366 10X3/UL (ref 130–400)
PMV BLD AUTO: 9.4 FL (ref 7.4–10.4)
POTASSIUM SERPL-SCNC: 3.6 MMOL/L (ref 3.5–5.1)
PREALB SERPL-MCNC: 19.2 MG/DL (ref 18–35.7)
RBC # BLD AUTO: 2.45 10X6/UL (ref 4–5.4)
SODIUM SERPL-SCNC: 147 MMOL/L (ref 136–145)
WBC # BLD AUTO: 7.6 10X3/UL (ref 4.8–10.8)

## 2019-01-16 PROCEDURE — 0DB68ZX EXCISION OF STOMACH, VIA NATURAL OR ARTIFICIAL OPENING ENDOSCOPIC, DIAGNOSTIC: ICD-10-PCS | Performed by: SURGERY

## 2019-01-16 PROCEDURE — 0DH63UZ INSERTION OF FEEDING DEVICE INTO STOMACH, PERCUTANEOUS APPROACH: ICD-10-PCS | Performed by: SURGERY

## 2019-01-16 PROCEDURE — 0DB58ZX EXCISION OF ESOPHAGUS, VIA NATURAL OR ARTIFICIAL OPENING ENDOSCOPIC, DIAGNOSTIC: ICD-10-PCS | Performed by: SURGERY

## 2019-01-16 NOTE — MORECARE
CASE MANAGEMENT DISCHARGE SUMMARY
 
 
PATIENT: CHANTAL TIPTON                        UNIT: [id number]
ACCOUNT#: [id number]                       ADM DATE: 19
AGE: 88     : [birth year]  SEX: F            ROOM/BED: D.2112    
AUTHOR: CHELE RAMON                             PHYSICIAN:                               
 
REFERRING PHYSICIAN: SHARATH TORRES MD               
DATE OF SERVICE: 19
Discharge Plan
 
 
Patient Name: CHANTAL TIPTON
Facility: [facility]:[place]
Encounter #: [id number]
Medical Record #: [id number]
: 1931
Planned Disposition: Skilled Nursing Facility
Anticipated Discharge Date: 19
 
Discharge Date: 
Expected LOS: 15
Initial Reviewer: [id number]
Initial Review Date: 2019
Generated: 19   5:35 pm 
Comments
 
DCP- Discharge Planning
 
Updated by [id number]: Austin Hoff on 19   3:34 pm CT
Patient Name:  CHANTAL TIPTON   
Encounter No:  [id number]   
:  1931   
Primary Insurance:  [employer] CHOICE PPO MCR ADVANT  
Anticipated DC Date: 2019   
Planned Disposition:  Skilled Nursing Facility  
External Planned Provider: [facility], MEDICARE REHAB BED 
  
  
DCP follow-up note: CM MET WITH PT AND SON/JENNYFERAFRANCISCAPRINCE BERMUDEZ, AT FAMILY 
REQUEST.  MR. BERMUDEZ REPORTS IT IS NOW TIME TO SEND REFERRAL TO [place] 
FOR REHAB PLACEMENT.  IMPORTANT MESSAGE FROM MEDICARE PROVIDED AND EXPLAINED. 
  
CM FAXED REFERRAL TO [facility], [phone].   
  
CM WAITING ADMISSION DETERMINATION FROM [facility] FOR 
REHAB PLACEMENT.  
  
NEERU VILLATORO
 
DCP- Discharge Planning
 
Updated by [email or web address]: Austin Hoff on 19   4:52 pm CT
Patient Name: CHANTAL TIPTON                                     
Admission Status: Elective   
Accout number: [id number]                              
Admission Date: 2019   
: 1931                                                        
Admission Diagnosis:ACUTE KIDNEY FAILURE, UNSPECIFIED   
Attending: SHARATH TORRES                                                
Current LOS:  2   
  
Anticipated DC Date:    
Planned Disposition: Skilled Nursing Facility   
Primary Insurance: [employer] CHOICE PPO MCR ADVANT   
PLANNED EXTERNAL PROVIDER:  [facility], MEDICARE REHAB 
BED  
  
Discharge Planning Comments:   
CM RECEIVED ORDER INDICATING PT WANTS NURSING HOME CARE.  CM MET WITH PT AND 
SON IN ROOM TO DISCUSS DISCHARGE PLANNING AND NEEDS. PT REPORTS THAT BEFORE 
GETTING SICK, SHE WAS LIVING AT HOME WITH HER SON, INDEPENDENT IN HER CARE.  
PT REPORTS SHE DID TELL THE DOCTOR THAT SHE WOULD BE BETTER OFF IN A NURSING 
HOME BEFORE FAMILY ARRIVED TODAY.  PT WANTS REHAB, NOT LONG TERM CARE.   PT 
HAS NO MEDICAL EQUIPMENT AND NO OUTSIDE SERVICES ASSISTING IN THE HOME. CM 
DISCUSSED AVAILABILITY OF HOME HEALTH, REHAB SERVICES AND MEDICAL EQUIPMENT. 
PT'S SON INITIALLY DECLINED TO PARTICIPATE IN DISCHARGE PLANNING REPORTING PT 
WAS SENT TO REHAB DOWNSTAIRS TOO SOON WHEN SHE WAS NOT ABLE TO WALK OR EVEN 
FEED HERSELF. SON WANTS PT CONSIDERED FOR INPATIENT REHAB AGAIN STATING THEY 
SENT HER BACK HERE BECAUSE SHE USED ALL OF HER DAYS DOWN THERE.  PT DOES NOT 
THINK SHE CAN PARTICIPATE IN THREE HOURS OF PROGRESSIVE THERAPY PER DAY.  CM 
EXPLAINED THAT IF PT IS NOT ABLE TO PARTICIPATE IN THE THREE HOURS OF 
PROGRESSIVE THERAPY, IS NOT ABLE TO STAND OR FEED HERSELF, SHE IS NOT GOING 
TO BE APPROPRIATE FOR READMISSION TO INPATENT REHAB.  CM DISCUSSED 
AVAILABILITY OF SKILLED NURSING REHAB SERVICES AND PROVIDED CHOICE FORM.  
PT'S SON REPORTS HE ALREADY SIGNED ONE OF THESE FOR [place] THE LAST 
VISIT AND SPEAKING WITH THIS CM.  CM EXPLAINED A NEW ONE WAS NEEDED.  PT'S 
SON SIGNED THE FORM AND DOES NOT WANT PT SENT TO REHAB BEFORE SHE IS STABLE.  
CM EXPLAINED THAT ALL DOCTORS WOULD HAVE TO INDICATE PT IS READY TO DISCHARGE 
AND CM IS ONLY WANTING TO BE PROACTIVE ON PT'S BEHALF FOR DISCHARGE PLANNING 
AND SECURE REHAB BED AHEAD OF TIME TO ENSURE THAT PT IS ABLE TO GET INTO [place] WHEN DISCHARGED AS [place] IS POPULAR REHAB AND DOES FILL UP.  
PT'S SON WILL WANTS PT TO BE STABLE FOR DISCHARGE FOR CM TO SEND REFERRALS.  
CHOICE SIGNED. CM PROVIDED PT'S SON WITH CM CONTACT INFORMATION.  
  
CM TO SEND REFERRAL TO [place] [facility] WHEN PROJECTED 
DISCHARGE DATE IS KNOWN, AS PT'S SON DOES NOT WANT REHAB REFERRAL SENT OUT 
PRIOR TO PT'S MEDICAL STABILITY FOR DISCHARGE TO REHAB.  
  
: Austin Hoff
 
 DCPIA - Discharge Planning Initial Assessment
 
 
Updated by [email or web address]: Austin Hoff on 19   5:43 pm
*  Is the patient Alert and Oriented?
Yes
*  How many steps to enter\exit or inside your home? NONE *  PCP DR. MCCALL *  Pharmacy
[facility]
*  Preadmission Environment
Acute Inpatient Rehab
*  Facility Name
[facility] INPATIENT REHAB
*  ADLs
Total Dependent
*  Equipment
None
*  Other Equipment
NO MEDICAL EQUIPMENT PROVIDER PREFERECE
*  List name and contact numbers for known caregivers / representatives who 
currently or will assist patient after discharge:
NATE KUNZ/PAUL, [phone]
*  Verbal permission to speak to the caregivers and representatives has been 
obtained from the patient.
Yes
*  Community resources currently utilized
None
*  Please name any agencies selected above.
NONE
*  Additional services required to return to the preadmission environment?
Yes
*  Can the patient safely return to the preadmission environment?
Yes
*  Has this patient been hospitalized within the prior 30 days at any 
hospital?
Yes
 
 
 
 
 
Coverage Notice
 
Reviewer: [id number] Gopi Hoff
 
Notice Issued Date-Time: 2019  13:00
Notice Type: Patient Choice Letter
 
Notice Delivered To: Family Member
Relationship to Patient: Nate
Representative Name: JULIANNE BERMUDEZ
 
Delivery Method: HAND - Hand Delivered
Ariadne Days:
Prior Verbal Notification: 
 
 
Recipient Understood Notice: Yes
Recipient Signature: Yes
Med Rec Note Co-signed by Attending:
 
[facility]age Notice Comment:  [facility]
Reviewer: [id number] Hoff
 
Notice Issued Date-Time: 2019  12:20
Notice Type: IM Discharge Notice
 
Notice Delivered To: Family Member
Relationship to Patient: Nate
Representative Name: JULIANNE BERMUDEZ
 
Delivery Method: HAND - Hand Delivered
Ariadne Days:
Prior Verbal Notification: 
 
Recipient Understood Notice: Yes
Recipient Signature: Yes
Med Rec Note Co-signed by Attending:
 
Coverage Notice Comment:  
 
Last DP export: 19   3:16 p
Patient Name: CHANTAL TIPTON
Encounter #: [id number]
Page [id number]
 
 
 
 
 
Electronically Signed by CHELE RAMON on 19 at 1635
 
 
 
 
 
 
**All edits/amendments must be made on the electronic document**
 
DICTATION DATE: 19 1635     : KARY  19 1635     
RPT#: [id number]                                DC DATE:        
                                               STATUS: ADM IN  
[facility]
[address] [place] [address]
***END OF REPORT***

## 2019-01-16 NOTE — NUR
Nutrition follow-up:
Pt NPO for PEG tube placement today
Pt has been refusing meals; no po intake in several days
Labs reviewed
Wt: 139#
Recommend starting Osmolite 1.0 aubrey @ 25 ml/hr with increas to goal rate of
65 ml/hr with 60 ml H2O flush Q 4 hours.
RDN following.

## 2019-01-16 NOTE — NUR
THERAPY GOT PT UP TO CHAIR. PT IS RESTING QUIETLY DENIES ANY PAIN OR NEEDS.
JUST SITTING UP IN CHAIR. CL IN REACH, WILL CTM.

## 2019-01-16 NOTE — NUR
MORNING MEDICATIONS GIVEN WITH SIP OF WATER WITH THE EXCEPTION OF HOLDING
BLOOD THINNERS FOR PLANNED PROCEDURE TODAY. PT IS A&O AND VERBALIZED
UNDERSTANDING OF PEG PLACEMENT AND WANTS IT AS SHE HAS NO APPETITE OR DESIRE
TO EAT. PULLED PT UP IN BED AND REPOSITIONED FOR COMFORT. PT VOICED THANKS.
PROVIDED LIP CARE FOR DRY LIPS. ALEXANDER IN PLACE DRAINING TO GRAVITY OFF R.SIDE
OF BED. NO CURRENT NEEDS. WILL CTM.

## 2019-01-16 NOTE — MORECARE
CASE MANAGEMENT DISCHARGE SUMMARY
 
 
PATIENT: CHANTAL TIPTON                        UNIT: [id number]
ACCOUNT#: [id number]                       ADM DATE: 19
AGE: 88     : [birth year]  SEX: F            ROOM/BED: D.2112    
AUTHOR: CHELE RAMON                             PHYSICIAN:                               
 
REFERRING PHYSICIAN: SHARATH TORRES MD               
DATE OF SERVICE: 19
Discharge Plan
 
 
Patient Name: CHANTAL TIPTON
[facility]
Encounter #: [id number]
Medical Record #: [id number]
: 1931
Planned Disposition: Skilled Nursing Facility
Anticipated Discharge Date: 19
 
Discharge Date: 
Expected LOS: 15
Initial Reviewer: [id number]
Initial Review Date: 2019
Generated: 19   5:16 pm 
Comments
 
DCP- Discharge Planning
 
Updated by [id number]: Austin Hoff on 19   4:52 pm CT
Patient Name: CHANTAL TIPTON                                     
Admission Status: Elective   
Accout number: [id number]                              
Admission Date: 2019   
: 1931                                                        
Admission Diagnosis:ACUTE KIDNEY FAILURE, UNSPECIFIED   
Attending: SHARATH TORRES                                                
Current LOS:  2   
  
Anticipated DC Date:    
Planned Disposition: Skilled Nursing Facility   
Primary Insurance: [employer] CHOICE PPO MCR ADVANT   
PLANNED EXTERNAL PROVIDER:  [facility], MEDICARE REHAB 
BED  
  
Discharge Planning Comments:   
CM RECEIVED ORDER INDICATING PT WANTS NURSING HOME CARE.  CM MET WITH PT AND 
SON IN ROOM TO DISCUSS DISCHARGE PLANNING AND NEEDS. PT REPORTS THAT BEFORE 
GETTING SICK, SHE WAS LIVING AT HOME WITH HER SON, INDEPENDENT IN HER CARE.  
 
PT REPORTS SHE DID TELL THE DOCTOR THAT SHE WOULD BE BETTER OFF IN A NURSING 
HOME BEFORE FAMILY ARRIVED TODAY.  PT WANTS REHAB, NOT LONG TERM CARE.   PT 
HAS NO MEDICAL EQUIPMENT AND NO OUTSIDE SERVICES ASSISTING IN THE HOME. CM 
DISCUSSED AVAILABILITY OF HOME HEALTH, REHAB SERVICES AND MEDICAL EQUIPMENT. 
PT'S SON INITIALLY DECLINED TO PARTICIPATE IN DISCHARGE PLANNING REPORTING PT 
WAS SENT TO REHAB DOWNSTAIRS TOO SOON WHEN SHE WAS NOT ABLE TO WALK OR EVEN 
FEED HERSELF. SON WANTS PT CONSIDERED FOR INPATIENT REHAB AGAIN STATING THEY 
SENT HER BACK HERE BECAUSE SHE USED ALL OF HER DAYS DOWN THERE.  PT DOES NOT 
THINK SHE CAN PARTICIPATE IN THREE HOURS OF PROGRESSIVE THERAPY PER DAY.  CM 
EXPLAINED THAT IF PT IS NOT ABLE TO PARTICIPATE IN THE THREE HOURS OF 
PROGRESSIVE THERAPY, IS NOT ABLE TO STAND OR FEED HERSELF, SHE IS NOT GOING 
TO BE APPROPRIATE FOR READMISSION TO INPATENT REHAB.  CM DISCUSSED 
AVAILABILITY OF SKILLED NURSING REHAB SERVICES AND PROVIDED CHOICE FORM.  
PT'S SON REPORTS HE ALREADY SIGNED ONE OF THESE FOR [place] THE LAST 
VISIT AND SPEAKING WITH THIS CM.  CM EXPLAINED A NEW ONE WAS NEEDED.  PT'S 
SON SIGNED THE FORM AND DOES NOT WANT PT SENT TO REHAB BEFORE SHE IS STABLE.  
CM EXPLAINED THAT ALL DOCTORS WOULD HAVE TO INDICATE PT IS READY TO DISCHARGE 
AND CM IS ONLY WANTING TO BE PROACTIVE ON PT'S BEHALF FOR DISCHARGE PLANNING 
AND SECURE REHAB BED AHEAD OF TIME TO ENSURE THAT PT IS ABLE TO GET INTO [facility] WHEN DISCHARGED AS [facility] IS POPULAR REHAB AND DOES FILL UP.  
PT'S SON WILL WANTS PT TO BE STABLE FOR DISCHARGE FOR CM TO SEND REFERRALS.  
CHOICE SIGNED. CM PROVIDED PT'S SON WITH CM CONTACT INFORMATION.  
  
CM TO SEND REFERRAL TO [facility] WHEN PROJECTED 
DISCHARGE DATE IS KNOWN, AS PT'S SON DOES NOT WANT REHAB REFERRAL SENT OUT 
PRIOR TO PT'S MEDICAL STABILITY FOR DISCHARGE TO REHAB.  
  
: Austin Hoff
 
 DCPIA - Discharge Planning Initial Assessment
 
Updated by [id number]: Austin Hoff on 19   5:43 pm
*  Is the patient Alert and Oriented?
Yes
*  How many steps to enter\exit or inside your home? NONE *  PCP DR. MCCALL *  Pharmacy
[facility]
*  Preadmission Environment
Acute Inpatient Rehab
*  Facility Name
[facility] INPATIENT REHAB
*  ADLs
Total Dependent
*  Equipment
None
*  Other Equipment
NO MEDICAL EQUIPMENT PROVIDER PREFERECE
*  List name and contact numbers for known caregivers / representatives who 
currently or will assist patient after discharge:
JULIANNE BERMUDEZ, NATE/POA, [phone]
*  Verbal permission to speak to the caregivers and representatives has been 
obtained from the patient.
Yes
 
*  Community resources currently utilized
None
*  Please name any agencies selected above.
NONE
*  Additional services required to return to the preadmission environment?
Yes
*  Can the patient safely return to the preadmission environment?
Yes
*  Has this patient been hospitalized within the prior 30 days at any 
hospital?
Yes
 
 
 
 
 
Coverage Notice
 
Reviewer: [id number] - Austin Hoff
 
Notice Issued Date-Time: 2019  13:00
Notice Type: Patient Choice Letter
 
Notice Delivered To: Family Member
Relationship to Patient: Son
Representative Name: JULIANNE BERMUDEZ
 
Delivery Method: HAND - Hand Delivered
Ariadne Days:
Prior Verbal Notification: 
 
Recipient Understood Notice: Yes
Recipient Signature: Yes
Med Rec Note Co-signed by Attending:
 
Coverage Notice Comment:  [facility]
 
Last DP export: 19   4:54 pm
Patient Name: CHANTAL TIPTON
 
Encounter #: [id number]
Page [id number]
 
 
 
 
 
Electronically Signed by CHELE RAMON on 19 at 1616
 
 
 
 
 
 
**All edits/amendments must be made on the electronic document**
 
DICTATION DATE: 19     : KARY  19     
RPT#: [id number]                                DC DATE:        
                                               STATUS: ADM IN  
[facility]
[address] [place] [address]
***END OF REPORT***

## 2019-01-16 NOTE — NUR
PRE-OP MEDICATIONS GIVEN AND PT IS READY FOR PROCEDURE. EKG COMPLETED AND SENT
WITH ANESTHESIA. FAMILY AT BEDSIDE. NO CURRENT NEEDS. WILL CPOC.

## 2019-01-16 NOTE — NUR
AM ROUNDS COMPLETED. INTRODUCED MYSELF TO PT AS PRIMARY RN FOR TODAYS SHIFT.
PT IS A&O RESTING QUIETLY IN BED AT THIS TIME. SHIFT ASSESSMENT COMPLETED.
WILL REVIEW CHART AND PULL MORNING MEDICATIONS AND CPOC. NO CURRENT NEEDS. CL
IN REACH.

## 2019-01-16 NOTE — NUR
PT BACK FROM PROCEDURE AWAKE AND ASKING FOR TYLENOL WILL GET IT FOR HER. VSS
AND BEING MONITERED PER POST PROCEDURE PROTOCOL. PT HAS A NEW PEG TUBE TO HER
LUQ OF HER ABDOMEN, SITE IS SLIGHTLY BLOODY. CLEANED SITE WITH ASEPTIC FOAM
CLEANSER AND PATTED DRY THEN PLACED DAVID DRAINAGE SPONGE AND SECURED WITH
TAPE. NO FAMILY PRESENT. WILL CPOC.

## 2019-01-16 NOTE — NUR
DID NOT COLLECT STOOL SPECIMEN FOR OCCULT BLOOD R/T PT NOT HAVING A BM AFTER
IT WAS ORDERED. WILL PASS ON IN REPORT.

## 2019-01-16 NOTE — NUR
CONSENTS OBTAINED AND PLACED IN CHART FOR PLANNED EGD AND PEG PLACEMENT. PT
BACK IN BED AND RESTING QUIETLY. FAMILY WAS HERE AND LEFT FOR A BIT. UPDATED
ON PLANS AND ALL AGREE. NO CURRENT NEEDS. WILL CTM.

## 2019-01-16 NOTE — NUR
PTS R.CHEST CVL DRSG IS WET FROM LEAKING AGAIN. STERILE DRSG CHANGE PROVIDED
AND BIOPATCH INTACT. DRSG CDI NOW AND ADHERED TO SKIN. CALLED PHARMACY BECAUSE
THEY WERE DOSING VANCOMYCIN HOWEVER NO TROUGHS HAVE BEEN DRAWN X2 DAYS. THEY
STATE TO HANG THIS DOSE AND THEY WILL LOOK INTO IT. PT SITTING UP IN BED
RESTING QUIETLY. CL IN REACH, BED IN LOWEST, SIDE RAILS X2 AND POSEY PAD IN
PLACE. NO CURRENT NEEDS. WILL CTM.

## 2019-01-17 VITALS — DIASTOLIC BLOOD PRESSURE: 96 MMHG | SYSTOLIC BLOOD PRESSURE: 116 MMHG

## 2019-01-17 VITALS — SYSTOLIC BLOOD PRESSURE: 178 MMHG | DIASTOLIC BLOOD PRESSURE: 70 MMHG

## 2019-01-17 VITALS — SYSTOLIC BLOOD PRESSURE: 121 MMHG | DIASTOLIC BLOOD PRESSURE: 55 MMHG

## 2019-01-17 VITALS — SYSTOLIC BLOOD PRESSURE: 136 MMHG | DIASTOLIC BLOOD PRESSURE: 58 MMHG

## 2019-01-17 VITALS — DIASTOLIC BLOOD PRESSURE: 75 MMHG | SYSTOLIC BLOOD PRESSURE: 107 MMHG

## 2019-01-17 VITALS — DIASTOLIC BLOOD PRESSURE: 70 MMHG | SYSTOLIC BLOOD PRESSURE: 139 MMHG

## 2019-01-17 LAB
ANION GAP SERPL CALC-SCNC: 12.9 MMOL/L (ref 8–16)
BASOPHILS NFR BLD AUTO: 0.4 % (ref 0–2)
BUN SERPL-MCNC: 17 MG/DL (ref 7–18)
CALCIUM SERPL-MCNC: 8 MG/DL (ref 8.5–10.1)
CHLORIDE SERPL-SCNC: 115 MMOL/L (ref 98–107)
CO2 SERPL-SCNC: 23.4 MMOL/L (ref 21–32)
CREAT SERPL-MCNC: 1.3 MG/DL (ref 0.6–1.3)
EOSINOPHIL NFR BLD: 0.1 % (ref 0–7)
ERYTHROCYTE [DISTWIDTH] IN BLOOD BY AUTOMATED COUNT: 17.1 % (ref 11.5–14.5)
GLUCOSE SERPL-MCNC: 87 MG/DL (ref 74–106)
HCT VFR BLD CALC: 27.9 % (ref 36–48)
HGB BLD-MCNC: 8.7 G/DL (ref 12–16)
IMM GRANULOCYTES NFR BLD: 0.4 % (ref 0–5)
LYMPHOCYTES NFR BLD AUTO: 7.3 % (ref 15–50)
MAGNESIUM SERPL-MCNC: 2 MG/DL (ref 1.8–2.4)
MCH RBC QN AUTO: 31.6 PG (ref 26–34)
MCHC RBC AUTO-ENTMCNC: 31.2 G/DL (ref 31–37)
MCV RBC: 101.5 FL (ref 80–100)
MONOCYTES NFR BLD: 5.6 % (ref 2–11)
NEUTROPHILS NFR BLD AUTO: 86.2 % (ref 40–80)
OSMOLALITY SERPL CALC.SUM OF ELEC: 292 MOSM/KG (ref 275–300)
PHOSPHATE SERPL-MCNC: 2.9 MG/DL (ref 2.5–4.9)
PLATELET # BLD: 449 10X3/UL (ref 130–400)
PMV BLD AUTO: 9.8 FL (ref 7.4–10.4)
POTASSIUM SERPL-SCNC: 4.3 MMOL/L (ref 3.5–5.1)
RBC # BLD AUTO: 2.75 10X6/UL (ref 4–5.4)
SODIUM SERPL-SCNC: 147 MMOL/L (ref 136–145)
WBC # BLD AUTO: 13.5 10X3/UL (ref 4.8–10.8)

## 2019-01-17 NOTE — NUR
Nutrition consult:
Received order to start TF s/p PEG tube placement.
Start Jevity 1.2 aubrey @ 20 ml/hr @ 1400 today.  Check residuals Q 4 hours.
If residuals are < 100 ml, increase TF 10 ml Q 4 hours to goal rate of
60 ml/hr.  Flush tube with 25 ml H2O q hour.
RDN following.

## 2019-01-17 NOTE — NUR
PT RESTING QUIETLY IN BED RR NONLABORED ON RA. PT VOICED RELIEF FROM IV PAIN
MEDICATION AND VOICED THANKS. PT DENIES ANY CURRENT NEEDS AT THIS TIME, TUBE
FEEDING STILL GOING AS ORDERED AND NO ISSUES NOTED, PT REFUSED DINNER AND ISNT
HUNGRY. CL IN REACH, BED IN LOWEST, SIDE RAILS X2 WILL CPOC.

## 2019-01-17 NOTE — NUR
PT REFUSED BREAKFAST AND STATES SHE IS JUST NOT HUNGRY. PROVIDED PT WITH PRN
TYLENOL AS REQUESTED FOR GENERALIZED PAIN. PT SWALLOWED HER MORNING
MEDICATIONS WITHOUT ANY DIFFICULTIES WITH . PT VOICED THANKS. PTS
R.CHEST CVL DRSG IS SATURATED WITH FLUID AGAIN. CHANGED DRSG USING STERILE
TECHNIQUE. NEW BIOPATCH CDI, CVL IS SUTURED IN NO S/S OF INFECTION OR REDDNESS
NOTED. NEW DRSG CLEAN AND DRY AND ADHERED TO SKIN. DATED AND INITIALED. NO
CURRENT NEEDS AT THIS TIME. CL IN REACH, BED IN LOWEST, SIDE RAILS X2 AND
POSEY PAD IN PLACE, NO FAMILY PRESENT AT THIS TIME. WILL CTM.

## 2019-01-17 NOTE — NUR
PROVIDED PT WITH NEW MEDICATIONS FOR THRUSH IN MOUTH AND PT IMMEDIATELY GOT
NAUSEATED AND THREW IT UP. WILL HAVE TO JUST DO ORAL CARE WITH THE [place] SO
SHE DOESNT HAVE TO HOLD IN HER MOUTH. PT SITTING UP WITH FAMILY AT BEDSIDE.
FAMILY COMPLAINING THAT HER DRSGS ON HER HEELS KEEP FALLING OFF HOWEVER SHE IS
CONSTANTLY MOVING AND KICKING THEM OFF. PROVIDED DRSG CHANGE AND ENFORCED WITH
MESH WRAP. WILL CTM.

## 2019-01-17 NOTE — NUR
X7 DAYS OF VANCOMYCIN THERAPY FOR UTI COMPLETE
PLEASE REORDER IF EXTENDED DURATION IS NEEDED
THANK YOU FOR THE CONSULT!

## 2019-01-17 NOTE — NUR
INITIATED TUBE FEEDING AS ORDERED. STARTED AT 20ML/HR WITH Q1H 25ML WATER
FLUSH. PT VERBALIZED UNDERSTANDING NOT TO PULL AT TUBE AND WILL CALL FOR ANY
DISCOMFORT. CL IN REACH, BED IN LOWEST, SIDE RAILS X2. NO FAMILY PRESENT. WILL
CTM.

## 2019-01-17 NOTE — NUR
RESUMING CARE. PI LAYING IN BED EYES CLOSED ON OVERLAY MATTERESS , PT ON RA ,
CVL ON RT CHEST SL PEG IN RUQ JEVITY RUNNING 20ML/HR W/ 25ML FLUSH Q HR BREATH
SOUNDS EVEN UNLABORED NO C/O PAIN OR DISTRESS NOTED CALL LIGT IN REACH WILL
CONT TO ANAIOR

## 2019-01-17 NOTE — MORECARE
CASE MANAGEMENT DISCHARGE SUMMARY
 
 
PATIENT: CHANTAL TIPTON                        UNIT: [id number]
ACCOUNT#: [id number]                       ADM DATE: 19
AGE: 88     : [birth year]  SEX: F            ROOM/BED: D.2112    
AUTHOR: CHELE RAMON                             PHYSICIAN:                               
 
REFERRING PHYSICIAN: SHARATH TORRES MD               
DATE OF SERVICE: 19
Discharge Plan
 
 
Patient Name: CHANTAL TIPTON
Facility: [facility]:[place]
Encounter #: [id number]
Medical Record #: [id number]
: 1931
Planned Disposition: Skilled Nursing Facility
Anticipated Discharge Date: 19
 
Discharge Date: 
Expected LOS: 15
Initial Reviewer: [id number]
Initial Review Date: 2019
Generated: 19   5:51 pm 
Comments
 
DCP- Discharge Planning
 
Updated by [id number]: Austin Hoff on 19   3:46 pm CT
Patient Name:  CHANTAL TIPTON   
Encounter No:  [id number]   
:  1931   
Primary Insurance:  HU[employer]NA CHOICE PPO MCR ADVANT  
Anticipated DC Date: 2019   
Planned Disposition:  Skilled Nursing Facility  
External Planned Provider:  [facility], MEDICARE REHAB 
BED  
  
  
DCP follow-up note: CM MET WITH PT'S TIANNA MCKEON BY MARRIAGE, AMARI LARA, 
[phone].  AMARI STATES THAT JULIANNE BERMUDEZ IS PT'S FIANCE.  PT HAS POWER 
OF  WITH GREAT NEPHEW, JOEL SPOUSE, MAGED BERMUDEZ, WHO WORKS IN 
[employer].  AMARI WILL GET MAGED TO HOSPITAL ALONG WITH POWER OF  
PAPERWORK AS SOON AS POSSIBLE.  THEY ARE IN AGREEMENT WITH REHAB AT [facility], BUT [facility] MAY NOT ACCEPT AS PT HAS CONTINUED DECLINE.  [facility] TO COME TOMORROW TO EVALUATE PT FOR REHAB.  IF [facility] DOES 
NOT ACCEPT FOR REHAB, THEY ARE THINKING TO TAKE PT HOME TO [facility] HERE 
IN [place] AND AMARI ALONG WITH FAMILY WILL CARE FOR PT WITH HOME 
 
HOSPICE.  AMARI REPORTS THEY WILL MAKE THAT DECISION TOMORROW.  CM EXPLAINED 
THAT THE POWER OF  WILL HAVE TO BE IN AGREEMENT AND SIGN ANY NEEDED 
AUTHORIZATIONS FOR ENROLLMENT IN REHAB OR HOSPICE CARE.  AMARI REPORTS 
UNDERSTANDING, PROVIDED CELL PHONE NUMBER FOR MAGED BERMUDEZ, [phone].  
  
CM WAITING ADMISSION DETERMINATION FROM [facility] FOR 
REHAB PLACEMENT.  
  
NEERU VILLATORO MANAGEMENT
 
DCP- Discharge Planning
 
Updated by [id number]: Austin Hoff on 19   3:34 pm CT
Patient Name:  CHANTAL TIPTON   
Encounter No:  [id number]   
:  1931   
Primary Insurance:  HU[employer]NA CHOICE PPO MCR ADVANT  
Anticipated DC Date: 2019   
Planned Disposition:  Skilled Nursing Facility  
External Planned Provider: [facility], MEDICARE REHAB BED 
  
  
DCP follow-up note: CM MET WITH PT AND SON/POA, JULIANNE BERMUDEZ, AT FAMILY 
REQUEST.  MR. BERMUDEZ REPORTS IT IS NOW TIME TO SEND REFERRAL TO [facility] 
FOR REHAB PLACEMENT.  IMPORTANT MESSAGE FROM MEDICARE PROVIDED AND EXPLAINED. 
  
CM FAXED REFERRAL TO [facility], [phone].   
  
CM WAITING ADMISSION DETERMINATION FROM [facility] FOR 
REHAB PLACEMENT.  
  
NEERU VILLATORO
DCP- Discharge Planning
 
Updated by [id number]: Austin Hoff on 19   4:52 pm CT
Patient Name: CHANTAL TIPTON                                     
Admission Status: Elective   
Accout number: [id number]                              
Admission Date: 2019   
: 1931                                                        
Admission Diagnosis:ACUTE KIDNEY FAILURE, UNSPECIFIED   
Attending: SHARATH TORRES                                                
Current LOS:  2   
  
Anticipated DC Date:    
Planned Disposition: Skilled Nursing Facility   
Primary Insurance: HU[employer]NA CHOICE PPO MCR ADVANT   
PLANNED EXTERNAL PROVIDER:  [facility], MEDICARE REHAB 
BED  
  
Discharge Planning Comments:   
CM RECEIVED ORDER INDICATING PT WANTS NURSING HOME CARE.  CM MET WITH PT AND 
 
SON IN ROOM TO DISCUSS DISCHARGE PLANNING AND NEEDS. PT REPORTS THAT BEFORE 
GETTING SICK, SHE WAS LIVING AT HOME WITH HER SON, INDEPENDENT IN HER CARE.  
PT REPORTS SHE DID TELL THE DOCTOR THAT SHE WOULD BE BETTER OFF IN A NURSING 
HOME BEFORE FAMILY ARRIVED TODAY.  PT WANTS REHAB, NOT LONG TERM CARE.   PT 
HAS NO MEDICAL EQUIPMENT AND NO OUTSIDE SERVICES ASSISTING IN THE HOME. CM 
DISCUSSED AVAILABILITY OF HOME HEALTH, REHAB SERVICES AND MEDICAL EQUIPMENT. 
PT'S SON INITIALLY DECLINED TO PARTICIPATE IN DISCHARGE PLANNING REPORTING PT 
WAS SENT TO REHAB DOWNSTAIRS TOO SOON WHEN SHE WAS NOT ABLE TO WALK OR EVEN 
FEED HERSELF. SON WANTS PT CONSIDERED FOR INPATIENT REHAB AGAIN STATING THEY 
SENT HER BACK HERE BECAUSE SHE USED ALL OF HER DAYS DOWN THERE.  PT DOES NOT 
THINK SHE CAN PARTICIPATE IN THREE HOURS OF PROGRESSIVE THERAPY PER DAY.  CM 
EXPLAINED THAT IF PT IS NOT ABLE TO PARTICIPATE IN THE THREE HOURS OF 
PROGRESSIVE THERAPY, IS NOT ABLE TO STAND OR FEED HERSELF, SHE IS NOT GOING 
TO BE APPROPRIATE FOR READMISSION TO INPATENT REHAB.  CM DISCUSSED 
AVAILABILITY OF SKILLED NURSING REHAB SERVICES AND PROVIDED CHOICE FORM.  
PT'S SON REPORTS HE ALREADY SIGNED ONE OF THESE FOR [facility] THE LAST 
VISIT AND SPEAKING WITH THIS CM.  CM EXPLAINED A NEW ONE WAS NEEDED.  PT'S 
SON SIGNED THE FORM AND DOES NOT WANT PT SENT TO REHAB BEFORE SHE IS STABLE.  
CM EXPLAINED THAT ALL DOCTORS WOULD HAVE TO INDICATE PT IS READY TO DISCHARGE 
AND CM IS ONLY WANTING TO BE PROACTIVE ON PT'S BEHALF FOR DISCHARGE PLANNING 
AND SECURE REHAB BED AHEAD OF TIME TO ENSURE THAT PT IS ABLE TO GET INTO [facility] WHEN DISCHARGED AS [facility] IS POPULAR REHAB AND DOES FILL UP.  
PT'S SON WILL WANTS PT TO BE STABLE FOR DISCHARGE FOR CM TO SEND REFERRALS.  
CHOICE SIGNED. CM PROVIDED PT'S SON WITH CM CONTACT INFORMATION.  
  
CM TO SEND REFERRAL TO [facility] WHEN PROJECTED 
DISCHARGE DATE IS KNOWN, AS PT'S SON DOES NOT WANT REHAB REFERRAL SENT OUT 
PRIOR TO PT'S MEDICAL STABILITY FOR DISCHARGE TO REHAB.  
  
: Austin Hoff
 
 DCPIA - Discharge Planning Initial Assessment
 
Updated by [id number]: Austin Hoff on 19   5:43 pm
*  Is the patient Alert and Oriented?
Yes
*  How many steps to enter\exit or inside your home? NONE *  PCP DR. MCCALL *  Pharmacy
[facility]
*  Preadmission Environment
Acute Inpatient Rehab
*  Facility Name
[facility] INPATIENT REHAB
*  ADLs
Total Dependent
*  Equipment
None
*  Other Equipment
NO MEDICAL EQUIPMENT PROVIDER PREFERECE
*  List name and contact numbers for known caregivers / representatives who 
currently or will assist patient after discharge:
JULIANNE BERMUDEZ, SON/POA, [phone]
*  Verbal permission to speak to the caregivers and representatives has been 
 
obtained from the patient.
Yes
*  Community resources currently utilized
None
*  Please name any agencies selected above.
NONE
*  Additional services required to return to the preadmission environment?
Yes
*  Can the patient safely return to the preadmission environment?
Yes
*  Has this patient been hospitalized within the prior 30 days at any 
hospital?
Yes
 
 
 
 
 
Coverage Notice
 
Reviewer: [id number] Gopi Hoff
 
Notice Issued Date-Time: 2019  13:00
Notice Type: Patient Choice Letter
 
Notice Delivered To: Family Member
Relationship to Patient: Son
Representative Name: JULIANNE BERMUDEZ
 
Delivery Method: HAND - Hand Delivered
Ariadne Days:
Prior Verbal Notification: 
 
Recipient Understood Notice: Yes
Recipient Signature: Yes
Med Rec Note Co-signed by Attending:
 
[facility]age Notice Comment[facility]  [facility]
Reviewer: [email or web address] Gopi Hoff
 
Notice Issued Date-Time: 2019  12:20
Notice Type: IM Discharge Notice
 
Notice Delivered To: Family Member
Relationship to Patient: Son
Representative Name: JULIANNE BERMUDEZ
 
Delivery Method: HAND - Hand Delivered
Ariadne Days:
Prior Verbal Notification: 
 
Recipient Understood Notice: Yes
 
Recipient Signature: Yes
Med Rec Note Co-signed by Attending:
 
Coverage Notice Comment:  
 
Last DP export: 19   3:35 p
Patient Name: CHANTAL TIPTON
Encounter #: [id number]
Page [id number]
 
 
 
 
 
Electronically Signed by CHELE RAMON on 19 at 1651
 
 
 
 
 
 
**All edits/amendments must be made on the electronic document**
 
DICTATION DATE: 19     : KARY  19     
RPT#: [id number]                                DC DATE:        
                                               STATUS: ADM IN  
[facility]
[address] [place] [address]
***END OF REPORT***

## 2019-01-18 VITALS — SYSTOLIC BLOOD PRESSURE: 159 MMHG | DIASTOLIC BLOOD PRESSURE: 68 MMHG

## 2019-01-18 VITALS — DIASTOLIC BLOOD PRESSURE: 63 MMHG | SYSTOLIC BLOOD PRESSURE: 171 MMHG

## 2019-01-18 VITALS — DIASTOLIC BLOOD PRESSURE: 64 MMHG | SYSTOLIC BLOOD PRESSURE: 152 MMHG

## 2019-01-18 VITALS — DIASTOLIC BLOOD PRESSURE: 70 MMHG | SYSTOLIC BLOOD PRESSURE: 171 MMHG

## 2019-01-18 VITALS — DIASTOLIC BLOOD PRESSURE: 73 MMHG | SYSTOLIC BLOOD PRESSURE: 106 MMHG

## 2019-01-18 LAB
ANION GAP SERPL CALC-SCNC: 11.2 MMOL/L (ref 8–16)
BASOPHILS NFR BLD AUTO: 0.2 % (ref 0–2)
BUN SERPL-MCNC: 20 MG/DL (ref 7–18)
CALCIUM SERPL-MCNC: 8.1 MG/DL (ref 8.5–10.1)
CHLORIDE SERPL-SCNC: 112 MMOL/L (ref 98–107)
CO2 SERPL-SCNC: 24.5 MMOL/L (ref 21–32)
CREAT SERPL-MCNC: 1.4 MG/DL (ref 0.6–1.3)
EOSINOPHIL NFR BLD: 2.2 % (ref 0–7)
ERYTHROCYTE [DISTWIDTH] IN BLOOD BY AUTOMATED COUNT: 17.6 % (ref 11.5–14.5)
GLUCOSE SERPL-MCNC: 140 MG/DL (ref 74–106)
HCT VFR BLD CALC: 27.7 % (ref 36–48)
HGB BLD-MCNC: 8.6 G/DL (ref 12–16)
IMM GRANULOCYTES NFR BLD: 0.4 % (ref 0–5)
LYMPHOCYTES NFR BLD AUTO: 7 % (ref 15–50)
MAGNESIUM SERPL-MCNC: 2 MG/DL (ref 1.8–2.4)
MCH RBC QN AUTO: 31.6 PG (ref 26–34)
MCHC RBC AUTO-ENTMCNC: 31 G/DL (ref 31–37)
MCV RBC: 101.8 FL (ref 80–100)
MONOCYTES NFR BLD: 8.4 % (ref 2–11)
NEUTROPHILS NFR BLD AUTO: 81.8 % (ref 40–80)
OSMOLALITY SERPL CALC.SUM OF ELEC: 291 MOSM/KG (ref 275–300)
PHOSPHATE SERPL-MCNC: 2.9 MG/DL (ref 2.5–4.9)
PLATELET # BLD: 431 10X3/UL (ref 130–400)
PMV BLD AUTO: 9.9 FL (ref 7.4–10.4)
POTASSIUM SERPL-SCNC: 3.7 MMOL/L (ref 3.5–5.1)
RBC # BLD AUTO: 2.72 10X6/UL (ref 4–5.4)
SODIUM SERPL-SCNC: 144 MMOL/L (ref 136–145)
WBC # BLD AUTO: 12.1 10X3/UL (ref 4.8–10.8)

## 2019-01-18 NOTE — MORECARE
CASE MANAGEMENT DISCHARGE SUMMARY
 
 
PATIENT: CHANTAL TIPTON                        UNIT: [id number]
ACCOUNT#: [id number]                       ADM DATE: 19
AGE: 88     : [birth year]  SEX: F            ROOM/BED: D.2    
AUTHOR: CHELE RAMON                             PHYSICIAN:                               
 
REFERRING PHYSICIAN: SHARATH TORRES MD               
DATE OF SERVICE: 19
Discharge Plan
 
 
Patient Name: CHANTAL TIPTON
Facility: [facility]:[place]
Encounter #: [id number]
Medical Record #: [id number]
: 1931
Planned Disposition: Skilled Nursing Facility
Anticipated Discharge Date: 19
 
Discharge Date: 
Expected LOS: 15
Initial Reviewer: [id number]
Initial Review Date: 2019
Generated: 19   9:18 am 
Comments
 
DCP- Discharge Planning
 
Updated by [id number]: Austin Chambers on 19   7:14 am CT
Patient Name:  CHANTAL TIPTON   
Encounter No:  [id number]   
:  1931   
Primary Insurance:  HUMANA CHOICE PPO MCR ADVANT  
Anticipated DC Date: 2019   
Planned Disposition:  Skilled Nursing Facility  
External Planned Provider: [facility], MEDICARE REHAB BED 
  
  
DCP follow-up note: CM FAXED UPDATE TO [facility], 
[phone] FOR REHAB REQUEST.  
  
CM WAITING ADMISSION DETERMINATION FROM [facility] FOR 
REHAB PLACEMENT.  
  
[facility] CASE PATRICIA
DCP- Discharge Planning
 
Updated by [id number]: Austin Chambers on 19   3:46 pm CT
 
Patient Name:  CHANTAL TIPTON   
Encounter No:  [id number]   
:  1931   
Primary Insurance:  HUMANA CHOICE PPO MCR ADVANT  
Anticipated DC Date: 2019   
Planned Disposition:  Skilled Nursing Facility  
External Planned Provider:  [facility] MEDICARE REHAB 
BED  
  
  
DCP follow-up note: CM MET WITH PT'S TIANNA MCKEON BY MARRIAGE, AMARI BERMUDEZ, 
[phone].  AMARI STATES THAT JULIANNE BERMUDEZ IS PT'S FIANCE.  PT HAS POWER 
OF  WITH GREAT NEPHEW, JOEL SPOUSE, MAGED BERMUDEZ, WHO WORKS IN 
[employer].  AMARI WILL GET MAGED TO HOSPITAL ALONG WITH POWER OF  
PAPERWORK AS SOON AS POSSIBLE.  THEY ARE IN AGREEMENT WITH REHAB AT [facility], BUT [facility] MAY NOT ACCEPT AS PT HAS CONTINUED DECLINE.  [facility] TO COME TOMORROW TO EVALUATE PT FOR REHAB.  IF [facility] DOES 
NOT ACCEPT FOR REHAB, THEY ARE THINKING TO TAKE PT HOME TO [facility] HERE 
IN [place] AND AMARI ALONG WITH FAMILY WILL CARE FOR PT WITH HOME 
HOSPICE.  AMARI REPORTS THEY WILL MAKE THAT DECISION TOMORROW.  CM EXPLAINED 
THAT THE POWER OF  WILL HAVE TO BE IN AGREEMENT AND SIGN ANY NEEDED 
AUTHORIZATIONS FOR ENROLLMENT IN REHAB OR HOSPICE CARE.  AMARI REPORTS 
UNDERSTANDING, PROVIDED CELL PHONE NUMBER FOR MAGED BERMUDEZ, [phone].  
  
CM WAITING ADMISSION DETERMINATION FROM [facility] FOR 
REHAB PLACEMENT.  
  
NEERU VILLATORO MANAGEMENT
 
DCP- Discharge Planning
 
Updated by [id number]: Austin Chambers on 19   3:34 pm CT
Patient Name:  CHANTAL TIPTON   
Encounter No:  [id number]   
:  1931   
Primary Insurance:  HUMANA CHOICE PPO [facility] ADVANT  
Anticipated DC Date: 2019   
Planned Disposition:  Skilled Nursing Facility  
External Planned Provider: [facility] MEDICARE REHAB BED 
  
  
DCP follow-up note: CM MET WITH PT AND SON/POA, JULIANNE BERMUDEZ, AT FAMILY 
REQUEST.  MR. BERMUDEZ REPORTS IT IS NOW TIME TO SEND REFERRAL TO [facility] 
FOR REHAB PLACEMENT.  IMPORTANT MESSAGE FROM MEDICARE PROVIDED AND EXPLAINED. 
  
CM FAXED REFERRAL TO [facility], [phone].   
  
CM WAITING ADMISSION DETERMINATION FROM [facility] FOR 
REHAB PLACEMENT.  
  
NEERU VILLATORO
DCP- Discharge Planning
 
 
Updated by [id number]: Austin Chambers on 19   4:52 pm CT
Patient Name: CHANTAL TIPTON                                     
Admission Status: Elective   
Accout number: [id number]                              
Admission Date: 2019   
: 1931                                                        
Admission Diagnosis:ACUTE KIDNEY FAILURE, UNSPECIFIED   
Attending: SHARATH TORRES                                                
Current LOS:  2   
  
Anticipated DC Date:    
Planned Disposition: Skilled Nursing Facility   
Primary Insurance: HU[employer]NA CHOICE PPO MCR ADVANT   
PLANNED EXTERNAL PROVIDER:  [facility], MEDICARE REHAB 
BED  
  
Discharge Planning Comments:   
CM RECEIVED ORDER INDICATING PT WANTS NURSING HOME CARE.  CM MET WITH PT AND 
SON IN ROOM TO DISCUSS DISCHARGE PLANNING AND NEEDS. PT REPORTS THAT BEFORE 
GETTING SICK, SHE WAS LIVING AT HOME WITH HER SON, INDEPENDENT IN HER CARE.  
PT REPORTS SHE DID TELL THE DOCTOR THAT SHE WOULD BE BETTER OFF IN A NURSING 
HOME BEFORE FAMILY ARRIVED TODAY.  PT WANTS REHAB, NOT LONG TERM CARE.   PT 
HAS NO MEDICAL EQUIPMENT AND NO OUTSIDE SERVICES ASSISTING IN THE HOME. CM 
DISCUSSED AVAILABILITY OF HOME HEALTH, REHAB SERVICES AND MEDICAL EQUIPMENT. 
PT'S SON INITIALLY DECLINED TO PARTICIPATE IN DISCHARGE PLANNING REPORTING PT 
WAS SENT TO REHAB DOWNSTAIRS TOO SOON WHEN SHE WAS NOT ABLE TO WALK OR EVEN 
FEED HERSELF. SON WANTS PT CONSIDERED FOR INPATIENT REHAB AGAIN STATING THEY 
SENT HER BACK HERE BECAUSE SHE USED ALL OF HER DAYS DOWN THERE.  PT DOES NOT 
THINK SHE CAN PARTICIPATE IN THREE HOURS OF PROGRESSIVE THERAPY PER DAY.  CM 
EXPLAINED THAT IF PT IS NOT ABLE TO PARTICIPATE IN THE THREE HOURS OF 
PROGRESSIVE THERAPY, IS NOT ABLE TO STAND OR FEED HERSELF, SHE IS NOT GOING 
TO BE APPROPRIATE FOR READMISSION TO INPATENT REHAB.  CM DISCUSSED 
AVAILABILITY OF SKILLED NURSING REHAB SERVICES AND PROVIDED CHOICE FORM.  
PT'S SON REPORTS HE ALREADY SIGNED ONE OF THESE FOR [facility] THE LAST 
VISIT AND SPEAKING WITH THIS CM.  CM EXPLAINED A NEW ONE WAS NEEDED.  PT'S 
SON SIGNED THE FORM AND DOES NOT WANT PT SENT TO REHAB BEFORE SHE IS STABLE.  
CM EXPLAINED THAT ALL DOCTORS WOULD HAVE TO INDICATE PT IS READY TO DISCHARGE 
AND CM IS ONLY WANTING TO BE PROACTIVE ON PT'S BEHALF FOR DISCHARGE PLANNING 
AND SECURE REHAB BED AHEAD OF TIME TO ENSURE THAT PT IS ABLE TO GET INTO [facility] WHEN DISCHARGED AS [facility] IS POPULAR REHAB AND DOES FILL UP.  
PT'S SON WILL WANTS PT TO BE STABLE FOR DISCHARGE FOR CM TO SEND REFERRALS.  
CHOICE SIGNED. CM PROVIDED PT'S SON WITH CM CONTACT INFORMATION.  
  
CM TO SEND REFERRAL TO [facility] WHEN PROJECTED 
DISCHARGE DATE IS KNOWN, AS PT'S SON DOES NOT WANT REHAB REFERRAL SENT OUT 
PRIOR TO PT'S MEDICAL STABILITY FOR DISCHARGE TO REHAB.  
  
: Austin Chambers
 
 DCPIA - Discharge Planning Initial Assessment
 
 
Updated by [id number]: Austin Chambers on 19   5:43 pm
*  Is the patient Alert and Oriented?
Yes
*  How many steps to enter\exit or inside your home? NONE *  PCP DR. MCCALL *  Pharmacy
[facility]
*  Preadmission Environment
Acute Inpatient Rehab
*  Facility Name
[facility] INPATIENT REHAB
*  ADLs
Total Dependent
*  Equipment
None
*  Other Equipment
NO MEDICAL EQUIPMENT PROVIDER PREFERECE
*  List name and contact numbers for known caregivers / representatives who 
currently or will assist patient after discharge:
NATE KUNZ/PAUL, [phone]
*  Verbal permission to speak to the caregivers and representatives has been 
obtained from the patient.
Yes
*  Community resources currently utilized
None
*  Please name any agencies selected above.
NONE
*  Additional services required to return to the preadmission environment?
Yes
*  Can the patient safely return to the preadmission environment?
Yes
*  Has this patient been hospitalized within the prior 30 days at any 
hospital?
Yes
 
 
 
 
 
Coverage Notice
 
Reviewer: [email or web address] Chambers
 
Notice Issued Date-Time: 2019  13:00
Notice Type: Patient Choice Letter
 
Notice Delivered To: Family Member
Relationship to Patient: Son
Representative Name: JULIANNE BERMUDEZ
 
Delivery Method: HAND - Hand Delivered
Ariadne Days:
Prior Verbal Notification: 
 
 
Recipient Understood Notice: Yes
Recipient Signature: Yes
Med Rec Note Co-signed by Attending:
 
Coverage Notice Comment:  [facility]
Reviewer: [email or web address] Chambers
 
Notice Issued Date-Time: 2019  12:20
Notice Type: IM Discharge Notice
 
Notice Delivered To: Family Member
Relationship to Patient: Son
Representative Name: JULIANNE BERMUDEZ
 
Delivery Method: HAND - Hand Delivered
Ariadne Days:
Prior Verbal Notification: 
 
Recipient Understood Notice: Yes
Recipient Signature: Yes
Med Rec Note Co-signed by Attending:
 
Coverage Notice Comment:  
 
Last DP export: 19   3:51 p
Patient Name: CHANTAL TIPTON
Encounter #: [id number]
Page [id number]
 
 
 
 
 
Electronically Signed by CHELE RAMON on 19 at 0818
 
 
 
 
 
 
**All edits/amendments must be made on the electronic document**
 
DICTATION DATE: 19     : KARY  19     
RPT#: [id number]                                DC DATE:        
                                               STATUS: ADM IN  
[facility]
[address] [place] [address]
***END OF REPORT***

## 2019-01-18 NOTE — NUR
[facility] CAME TO EVAL PT AND SHE WAS  AND
VISITING HOWEVER SHE DID VOICE SHE CAN DO THERAPY BUT NOT AGGRESSIVE AND
DOESNT WANT TO WALK OR DO MUCH. PT IS HAPPY BUT APPEARS TO BE SO WEAK AND JUST
FAILURE TO THRIVE. WILL DISCUSS WITH FAMILY BUT PT IS A&O AND SHOULDNT BE
FORCED IF SHE IS NOT WANTING TO.

## 2019-01-18 NOTE — NUR
PT COMPLAINING OF PAIN IN BACK. REQUESTED PRN PAIN MEDICATION. VITALS STABLE.
R-16. SEE MAR. 24 OZ OF JEVITY GOING AT 60ML/HR WITH A 25ML FLUSH EVERY HOUR.
RESIDUAL IS 20. PT RESTING COMFORTABLY AT THIS TIME. BED LOW CALL LIGHT WITHIN
REACH. WILL CONTINUE TO MONITOR.

## 2019-01-18 NOTE — NUR
PTS FAMILY AT BEDSIDE FEEDING HER, SHE IS WANTING TO EAT A LITTLE BIT. STOPPED
PTS TUBE FEEDING TO CHECK RESIDUAL AND WILL INCREASE IF TOLERATES. NO CURRENT
NEEDS. WILL CTM.

## 2019-01-18 NOTE — OP
PATIENT NAME:  CHANTAL TIPTON                              MEDICAL RECORD: [id number]
:[birth year]                                             LOCATION:D.M2     D.2112
                                                         ADMISSION DATE:19
SURGEON:  NINOSKA NATION MD            
 
 
DATE OF OPERATION:  2019
 
PREOPERATIVE DIAGNOSES:
1.  Failure to thrive.
2.  Feeding problems.
3.  Acute malnutrition.
 
POSTOPERATIVE DIAGNOSES:
1.  Failure to thrive.
2.  Feeding problems.
3.  Acute malnutrition.
4.  Panesophagitis, likely due to Candida esophagitis.
5.  Moderate-size hiatal hernia.
 
PROCEDURES:
1.  Esophagogastroduodenoscopy with antral and mid esophageal biopsies.
2.  Placement of a 20-Armenian percutaneous endoscopic gastrostomy tube.
 
SURGEON:  Ninoska Nation MD
 
ASSISTANT:  None.
 
BLOOD LOSS:  Minimal.
 
ANESTHESIA:  Local with IV sedation.
 
COMPLICATIONS:  None.
 
The risks, possible complications and alternatives to the procedure were
explained to the patient.  She elects to proceed.  Discussion specifically
included, but was not limited to, bleeding requiring emergency reoperation,
infection, intestinal injury as well as possible need for additional procedures
should there be dislodgement of the gastrostomy tube.
 
OPERATIVE COURSE:  The patient was conveyed to the endoscopy suite electively on
2019.  IV sedation was induced by the anesthesia staff.  The abdomen was
sterilely prepped and draped.  A bite block was inserted.  A gastroscope was
inserted into the mouth.  It was advanced easily into the hypopharynx.  The
esophagus was easily intubated as were the stomach and duodenum.  Upon
withdrawal, retroflexed and angulus views were obtained.  Antral biopsies were
obtained.  I then indented the anterior abdominal wall skin.  I was able to
visualize this endoscopically.  An area for insertion of the gastrostomy tube
was noted and this is going to be in the left upper quadrant.  This area was
infiltrated with a local anesthetic.  A transverse incision was accomplished. 
Through the transverse incision, I accessed the anterior portion of the stomach
with an Angiocath.  A guidewire was advanced through the Angiocath.  This was
grasped with an endoscopic snare and was withdrawn out through the mouth.  The
wire was attached to a pull-type gastrostomy tube, which was then pulled into
place.  Hub and flange devices were attached.  I then re-endoscoped the
patient's esophagus and stomach.  There had been no evidence of false passage or
perforation.  Mid esophageal biopsies were obtained.  The endoscope was then
withdrawn under direct vision.
 
 
 
OPERATIVE REPORT                               [id number]    CHANTAL TIPTON            
 
 
 
We can begin tube feedings on the patient tomorrow.  I went out and spoke to
patient's family and discussed the endoscopic findings with them.
 
TRANSINT:[id number] Voice Confirmation ID: [id number] DOCUMENT ID: [id number]
                                           
                                           NINOSKA NATION MD            
 
 
 
Electronically Signed by NINOSKA NATION on 19 at 1802
 
 
 
 
 
 
 
 
 
 
 
 
 
 
 
 
 
 
 
 
 
 
 
 
 
 
 
 
 
 
 
 
 
 
 
 
CC: STACEY MAGDALENO MD and SHARATH TORRES                           [id number]
DICTATION DATE: 19 1036     :     19 1147      ADM IN  
                                                                              
[facility]                                          
[address] [place] [address]

## 2019-01-18 NOTE — NUR
Nutrition follow-up:
Pt with PEG tube placed and jevity 1.2 aubrey now infusing @ 50 ml/hr.  Pt
tolerating.
Pt also eating a regular diet as tolerated.
Labs reviewed
Wt: 138#
RDN following.

## 2019-01-18 NOTE — NUR
THERAPY AT BEDSIDE AND ASSISTED PT BACK INTO BED. FLUSHED TRIPLE LUMEN OF
R.CHEST CVL AND WHITE LUMEN HAD SLIGHT RESISTANCE BUT WAS ABLE TO BE FLUSHED
AND HAS BLOOD RETURN. CHECKED RESIDUAL AND REC'D ALMOST 10CC OF FEED, RETURNED
AND THEN TURNED FEEDING BACK ON AND INCREASED IT TO 60ML/HR. TUBE FEEDING NOW
AT GOAL RATE AND PT TOLERATING WITHOUT ANY C/O PAIN OR STOMACH ISSUES. ALEXANDER
DRAINING TO GRAVITY OFF R.SIDE OF THE BED WITHOUT ANY ISSUES. ALEXANDER CARE
PROVIDED VIA CNA WASHED WITH SOAP AND WATER, STAT LOCK SECURED TO INNER THIGH
AND NO KINKS NOTED. PT WOULD LIKE TO REST NOW, COVERED HER WITH BLANKETS AND
SHE VOICED THANKS. CL IN REACH, BED IN LOWEST, SIDE RAILS X2 AND POSEY PAD IN
PLACE. WILL CTM.

## 2019-01-18 NOTE — NUR
AM ROUNDS COMPLETED. INTRODUCED MYSELF TO PT AS PRIMARY RN FOR TODAYS SHIFT.
PT IS A&O SITTING UP IN BED RESTING QUIETLY. PT STATES SHE SLEPT GOOD AND
DENIES ANY PAIN. CHECKED RESIDUAL ON PEG TUBE AND REC'D 10CC SO I INCREASED
THE RATE BY 10ML AS ORDERED AND PT NOW RECIEVING JEVITY 1.2CAL @50ML/HR AND
SEEMS TO BE TOLERATING WELL. PT STATES SHE WILL TRY TO EAT SOME BREAKFAST,
WILL HAVE CNA ASSIST. ALEXANDER CATHETER DRAINING OFF R.SIDE OF BED CONCENTRATED
URINE. NO IMMEDATE NEEDS AT THIS TIME. CL IN REACH, BED IN LOWEST, SIDE RAILS
X2 AND POSEY PAD IN PLACE. WILL CPOC.

## 2019-01-19 VITALS — DIASTOLIC BLOOD PRESSURE: 51 MMHG | SYSTOLIC BLOOD PRESSURE: 138 MMHG

## 2019-01-19 VITALS — SYSTOLIC BLOOD PRESSURE: 145 MMHG | DIASTOLIC BLOOD PRESSURE: 60 MMHG

## 2019-01-19 VITALS — DIASTOLIC BLOOD PRESSURE: 51 MMHG | SYSTOLIC BLOOD PRESSURE: 130 MMHG

## 2019-01-19 VITALS — SYSTOLIC BLOOD PRESSURE: 123 MMHG | DIASTOLIC BLOOD PRESSURE: 51 MMHG

## 2019-01-19 VITALS — DIASTOLIC BLOOD PRESSURE: 64 MMHG | SYSTOLIC BLOOD PRESSURE: 136 MMHG

## 2019-01-19 VITALS — DIASTOLIC BLOOD PRESSURE: 63 MMHG | SYSTOLIC BLOOD PRESSURE: 136 MMHG

## 2019-01-19 LAB
ANION GAP SERPL CALC-SCNC: 8.9 MMOL/L (ref 8–16)
BUN SERPL-MCNC: 23 MG/DL (ref 7–18)
CALCIUM SERPL-MCNC: 7.4 MG/DL (ref 8.5–10.1)
CHLORIDE SERPL-SCNC: 112 MMOL/L (ref 98–107)
CO2 SERPL-SCNC: 27.8 MMOL/L (ref 21–32)
CREAT SERPL-MCNC: 1.5 MG/DL (ref 0.6–1.3)
GLUCOSE SERPL-MCNC: 118 MG/DL (ref 74–106)
MAGNESIUM SERPL-MCNC: 1.9 MG/DL (ref 1.8–2.4)
OSMOLALITY SERPL CALC.SUM OF ELEC: 291 MOSM/KG (ref 275–300)
PHOSPHATE SERPL-MCNC: 3.3 MG/DL (ref 2.5–4.9)
POTASSIUM SERPL-SCNC: 4.7 MMOL/L (ref 3.5–5.1)
SODIUM SERPL-SCNC: 144 MMOL/L (ref 136–145)

## 2019-01-19 NOTE — NUR
PATIENT IS ALERT AND ORIENTED AT TIMES. SHE IS LETHARGIC ANS IS SLUPED TO THE
RIGHT. SHE WILL NOT SIT UP ALL THE WAY. PATIENT IS ABLE TO SWALLOW PILLS AND
TAKE A FEW BITS OF FOOD WITH ASSISTANCE. I HAD TO SUPPORT HER WITH PILLOWS AND
HOL HER HEAD UP TO TAKE PILLS. SHE IS WEAK. SHE DID NOT EAT VERY MUCH FOOD.
JUST A FEW BITES.

## 2019-01-19 NOTE — NUR
RN NOTE: PATIENT RESTING COMFORTABLY IN BED. RESPIRATIONS ARE EVEN AND
UNLABORED. NO S/S OF DISTRESS. CALL LIGHT WITHIN REACH. WILL CPOC.

## 2019-01-19 NOTE — NUR
ADMIN SCHED PO MEDS SWALLOWING WITHOUT DIFFICULTY. TOOK A FEW SIPS OF HER
SODA. REPOSITIONED UP IN BED. LEFT DOOR OPEN TO MONITOR CLOSELY.

## 2019-01-20 VITALS — DIASTOLIC BLOOD PRESSURE: 75 MMHG | SYSTOLIC BLOOD PRESSURE: 140 MMHG

## 2019-01-20 VITALS — DIASTOLIC BLOOD PRESSURE: 59 MMHG | SYSTOLIC BLOOD PRESSURE: 142 MMHG

## 2019-01-20 VITALS — SYSTOLIC BLOOD PRESSURE: 104 MMHG | DIASTOLIC BLOOD PRESSURE: 66 MMHG

## 2019-01-20 VITALS — SYSTOLIC BLOOD PRESSURE: 140 MMHG | DIASTOLIC BLOOD PRESSURE: 97 MMHG

## 2019-01-20 VITALS — DIASTOLIC BLOOD PRESSURE: 59 MMHG | SYSTOLIC BLOOD PRESSURE: 179 MMHG

## 2019-01-20 LAB
ANION GAP SERPL CALC-SCNC: 10.7 MMOL/L (ref 8–16)
BASOPHILS NFR BLD AUTO: 0.4 % (ref 0–2)
BUN SERPL-MCNC: 29 MG/DL (ref 7–18)
CALCIUM SERPL-MCNC: 7.8 MG/DL (ref 8.5–10.1)
CHLORIDE SERPL-SCNC: 111 MMOL/L (ref 98–107)
CO2 SERPL-SCNC: 27.3 MMOL/L (ref 21–32)
CREAT SERPL-MCNC: 1.3 MG/DL (ref 0.6–1.3)
EOSINOPHIL NFR BLD: 3.6 % (ref 0–7)
ERYTHROCYTE [DISTWIDTH] IN BLOOD BY AUTOMATED COUNT: 17.8 % (ref 11.5–14.5)
GLUCOSE SERPL-MCNC: 115 MG/DL (ref 74–106)
HCT VFR BLD CALC: 28.1 % (ref 36–48)
HGB BLD-MCNC: 8.4 G/DL (ref 12–16)
IMM GRANULOCYTES NFR BLD: 0.9 % (ref 0–5)
LYMPHOCYTES NFR BLD AUTO: 9.4 % (ref 15–50)
MCH RBC QN AUTO: 31.6 PG (ref 26–34)
MCHC RBC AUTO-ENTMCNC: 29.9 G/DL (ref 31–37)
MCV RBC: 105.6 FL (ref 80–100)
MONOCYTES NFR BLD: 10.4 % (ref 2–11)
NEUTROPHILS NFR BLD AUTO: 75.3 % (ref 40–80)
OSMOLALITY SERPL CALC.SUM OF ELEC: 293 MOSM/KG (ref 275–300)
PLATELET # BLD: 409 10X3/UL (ref 130–400)
PMV BLD AUTO: 9.9 FL (ref 7.4–10.4)
POTASSIUM SERPL-SCNC: 5 MMOL/L (ref 3.5–5.1)
RBC # BLD AUTO: 2.66 10X6/UL (ref 4–5.4)
SODIUM SERPL-SCNC: 144 MMOL/L (ref 136–145)
WBC # BLD AUTO: 10.7 10X3/UL (ref 4.8–10.8)

## 2019-01-20 NOTE — MORECARE
CASE MANAGEMENT DISCHARGE SUMMARY
 
 
PATIENT: CHANTAL TIPTON                        UNIT: [id number]
ACCOUNT#: [id number]                       ADM DATE: 19
AGE: 88     : [birth year]  SEX: F            ROOM/BED: D.2112    
AUTHOR: CHELE RAMON                             PHYSICIAN:                               
 
REFERRING PHYSICIAN: SHARATH TORRES MD               
DATE OF SERVICE: 19
Discharge Plan
 
 
Patient Name: CHANTAL TIPTON
Facility: [facility]
Encounter #: [id number]
Medical Record #: [id number]
: 1931
Planned Disposition: Skilled Nursing Facility
Anticipated Discharge Date: 19
 
Discharge Date: 
Expected LOS: 15
Initial Reviewer: [id number]
Initial Review Date: 2019
Generated: 19   6:25 pm 
Comments
 
DCP- Discharge Planning
 
Updated by [id number]: Melany Olivares on 19   4:25 pm CT
CM RECEIVED AN ORDER REGARDING PLANS TO DISCHARGE TO HOME W/ HOSPITAL BED , 
TUBE FEEDING AND HOME HEALTH. PATIENT'S NUTRITION NOTE IS 19. WILL NEED 
NUTRITION NOTE REGARDING PATIENT NEEDS FOR DISCHARGE. CM UNDERSTANDS THE 
PATIENT DOES EAT AND TAKE HER MEDICATIONS BY MOUTH. SHE IS PRESENTLY ON 
FEEDINGS VIA  PUMP. SHE HAS BEEN ADVANCED TO 60 CC/HR TODAY WHICH IS HER GOAL 
RATE.  
SPOKE WITH PRIMARY ELIUD AND SHE REPORTS NO RESIDUAL AT THIS TIME.  
WILL NEED TO HAVE [facility] PROVIDE BED AND SPECIALTY MATTRESS  FOR HOME 
AND   
DETERMINE COVERAGE FOR TUBE FEEDING AND EQUIPMENT.  
CM TO FOLLOW UP IN AM.
DCP- Discharge Planning
 
Updated by [id number]: Austin Chambers on 19   7:14 am CT
Patient Name:  CHANTAL TIPTON   
Encounter No:  [id number]   
:  1931   
Primary Insurance:  HU[employer]NA CHOICE PPO MCR ADVANT  
Anticipated DC Date: 2019   
 
Planned Disposition:  Skilled Nursing Facility  
External Planned Provider: [facility], MEDICARE REHAB BED 
  
  
DCP follow-up note: CM FAXED UPDATE TO [facility], 
[phone] FOR REHAB REQUEST.  
  
CM WAITING ADMISSION DETERMINATION FROM [facility] FOR 
REHAB PLACEMENT.  
  
[facility] CASE MANAGEMENT
DCP- Discharge Planning
 
Updated by [email or web address]: Austin Chambers on 19   3:46 pm CT
Patient Name:  CHANTAL TIPTON   
Encounter No:  [id number]   
:  1931   
Primary Insurance:  HUMANA CHOICE PPO MCR ADVANT  
Anticipated DC Date: 2019   
Planned Disposition:  Skilled Nursing Facility  
External Planned Provider:  [facility], MEDICARE REHAB 
BED  
  
  
DCP follow-up note: CM MET WITH PT'S TIANNA MCKEON BY MARRIAGE, AMARI BERMUDEZ, 
[phone].  AMARI STATES THAT JULIANNE BERMUDEZ IS PT'S FIANCE.  PT HAS POWER 
OF  WITH [employer], JOEL SPOUSE, MAGED BERMUDEZ, WHO WORKS IN 
[employer].  AMARI WILL GET MAGED TO HOSPITAL ALONG WITH POWER OF  
PAPERWORK AS SOON AS POSSIBLE.  THEY ARE IN AGREEMENT WITH REHAB AT [facility], BUT [facility] MAY NOT ACCEPT AS PT HAS CONTINUED DECLINE.  [facility] TO COME TOMORROW TO EVALUATE PT FOR REHAB.  IF [facility] DOES 
NOT ACCEPT FOR REHAB, THEY ARE THINKING TO TAKE PT HOME TO [facility] HERE 
IN [place] AND AMARI ALONG WITH FAMILY WILL CARE FOR PT WITH HOME 
HOSPICE.  AMARI REPORTS THEY WILL MAKE THAT DECISION TOMORROW.  CM EXPLAINED 
THAT THE POWER OF  WILL HAVE TO BE IN AGREEMENT AND SIGN ANY NEEDED 
AUTHORIZATIONS FOR ENROLLMENT IN REHAB OR HOSPICE CARE.  AMARI REPORTS 
UNDERSTANDING, PROVIDED CELL PHONE NUMBER FOR MAGED BERMUDEZ, [phone].  
  
CM WAITING ADMISSION DETERMINATION FROM [facility] FOR 
REHAB PLACEMENT.  
  
[facility], CASE MANAGEMENT
 
DCP- Discharge Planning
 
Updated by [id number]: Austin Chambers on 19   3:34 pm CT
Patient Name:  CHANTAL TIPTON   
Encounter No:  [id number]   
:  1931   
Primary Insurance:  HU[employer]NA CHOICE PPO MCR ADVANT  
Anticipated DC Date: 2019   
Planned Disposition:  Skilled Nursing Facility  
 
[facility] 
  
  
DCP follow-up note: CM MET WITH PT AND SON/POA, JULIANNE BERMUDEZ, AT FAMILY 
REQUEST.  MR. BERMUDEZ REPORTS IT IS NOW TIME TO SEND REFERRAL TO [facility] 
FOR REHAB PLACEMENT.  IMPORTANT MESSAGE FROM MEDICARE PROVIDED AND EXPLAINED. 
  
[phone]   
  
CM WAITING ADMISSION DETERMINATION FROM [facility] FOR 
REHAB PLACEMENT.  
  
[facility], CASE MANAGEMENT
DCP- Discharge Planning
 
Updated by [id number]: Austin Luis Eduardo on 19   4:52 pm CT
Patient Name: CHANTAL TIPTON                                     
Admission Status: Elective   
Accout number: [id number]                              
Admission Date: 2019   
: 1931                                                        
Admission Diagnosis:ACUTE KIDNEY FAILURE, UNSPECIFIED   
Attending: SHARATH TORRES                                                
Current LOS:  2   
  
Anticipated DC Date:    
Planned Disposition: Skilled Nursing Facility   
Primary Insurance: HU[employer] CHOICE PPO MCR ADVANT   
PLANNED EXTERNAL PROVIDER:  [facility], MEDICARE REHAB 
BED  
  
Discharge Planning Comments:   
CM RECEIVED ORDER INDICATING PT WANTS NURSING HOME CARE.  CM MET WITH PT AND 
SON IN ROOM TO DISCUSS DISCHARGE PLANNING AND NEEDS. PT REPORTS THAT BEFORE 
GETTING SICK, SHE WAS LIVING AT HOME WITH HER SON, INDEPENDENT IN HER CARE.  
PT REPORTS SHE DID TELL THE DOCTOR THAT SHE WOULD BE BETTER OFF IN A NURSING 
HOME BEFORE FAMILY ARRIVED TODAY.  PT WANTS REHAB, NOT LONG TERM CARE.   PT 
HAS NO MEDICAL EQUIPMENT AND NO OUTSIDE SERVICES ASSISTING IN THE HOME. CM 
DISCUSSED AVAILABILITY OF HOME HEALTH, REHAB SERVICES AND MEDICAL EQUIPMENT. 
PT'S SON INITIALLY DECLINED TO PARTICIPATE IN DISCHARGE PLANNING REPORTING PT 
WAS SENT TO REHAB DOWNSTAIRS TOO SOON WHEN SHE WAS NOT ABLE TO WALK OR EVEN 
FEED HERSELF. SON WANTS PT CONSIDERED FOR INPATIENT REHAB AGAIN STATING THEY 
SENT HER BACK HERE BECAUSE SHE USED ALL OF HER DAYS DOWN THERE.  PT DOES NOT 
THINK SHE CAN PARTICIPATE IN THREE HOURS OF PROGRESSIVE THERAPY PER DAY.  CM 
EXPLAINED THAT IF PT IS NOT ABLE TO PARTICIPATE IN THE THREE HOURS OF 
PROGRESSIVE THERAPY, IS NOT ABLE TO STAND OR FEED HERSELF, SHE IS NOT GOING 
TO BE APPROPRIATE FOR READMISSION TO INPATENT REHAB.  CM DISCUSSED 
AVAILABILITY OF SKILLED NURSING REHAB SERVICES AND PROVIDED CHOICE FORM.  
PT'S SON REPORTS HE ALREADY SIGNED ONE OF THESE FOR [facility] THE LAST 
VISIT AND SPEAKING WITH THIS CM.  CM EXPLAINED A NEW ONE WAS NEEDED.  PT'S 
SON SIGNED THE FORM AND DOES NOT WANT PT SENT TO REHAB BEFORE SHE IS STABLE.  
CM EXPLAINED THAT ALL DOCTORS WOULD HAVE TO INDICATE PT IS READY TO DISCHARGE 
 
AND CM IS ONLY WANTING TO BE PROACTIVE ON PT'S BEHALF FOR DISCHARGE PLANNING 
AND SECURE REHAB BED AHEAD OF TIME TO ENSURE THAT PT IS ABLE TO GET INTO [facility] WHEN DISCHARGED AS [facility] IS POPULAR REHAB AND DOES FILL UP.  
PT'S SON WILL WANTS PT TO BE STABLE FOR DISCHARGE FOR CM TO SEND REFERRALS.  
CHOICE SIGNED. CM PROVIDED PT'S SON WITH CM CONTACT INFORMATION.  
  
CM TO SEND REFERRAL TO [facility] WHEN PROJECTED 
DISCHARGE DATE IS KNOWN, AS PT'S SON DOES NOT WANT REHAB REFERRAL SENT OUT 
PRIOR TO PT'S MEDICAL STABILITY FOR DISCHARGE TO REHAB.  
  
: Austin Chambers
 
 DCPIA - Discharge Planning Initial Assessment
 
Updated by [id number]: Austin Chambers on 19   5:43 pm
*  Is the patient Alert and Oriented?
Yes
*  How many steps to enter\exit or inside your home? NONE *  PCP DR. MCCALL *  Pharmacy
[facility]
*  Preadmission Environment
Acute Inpatient Rehab
*  Facility Name
[facility] INPATIENT REHAB
*  ADLs
Total Dependent
*  Equipment
None
*  Other Equipment
NO MEDICAL EQUIPMENT PROVIDER PREFERECE
*  List name and contact numbers for known caregivers / representatives who 
currently or will assist patient after discharge:
NATE KUNZ/POA, [phone]
*  Verbal permission to speak to the caregivers and representatives has been 
obtained from the patient.
Yes
*  Community resources currently utilized
None
*  Please name any agencies selected above.
NONE
*  Additional services required to return to the preadmission environment?
Yes
*  Can the patient safely return to the preadmission environment?
Yes
*  Has this patient been hospitalized within the prior 30 days at any 
hospital?
Yes
 
 
 
 
 
Coverage Notice
 
 
Reviewer: [email or web address] Gopi Chambers
 
Notice Issued Date-Time: 2019  13:00
Notice Type: Patient Choice Letter
 
Notice Delivered To: Family Member
Relationship to Patient: Son
Representative Name: JULIANNE BERMUDEZ
 
Delivery Method: HAND - Hand Delivered
Ariadne Days:
Prior Verbal Notification: 
 
Recipient Understood Notice: Yes
Recipient Signature: Yes
Med Rec Note Co-signed by Attending:
 
Coverage Notice Comment:  [facility]
Reviewer: [email or web address] Gopi Chambers
 
Notice Issued Date-Time: 2019  12:20
Notice Type: IM Discharge Notice
 
Notice Delivered To: Family Member
Relationship to Patient: Son
Representative Name: JULIANNE BERMUDEZ
 
Delivery Method: HAND - Hand Delivered
Ariadne Days:
Prior Verbal Notification: 
 
Recipient Understood Notice: Yes
Recipient Signature: Yes
Med Rec Note Co-signed by Attending:
 
Coverage Notice Comment:  
 
Last DP export: 19   7:18 a
Patient Name: CHANTAL TIPTON
 
Encounter #: [id number]
Page [id number]
 
 
 
 
 
Electronically Signed by CHELE RAMON on 19 at 1726
 
 
 
 
 
 
**All edits/amendments must be made on the electronic document**
 
DICTATION DATE: 19     : KARY  19     
RPT#: [id number]                                DC DATE:        
                                               STATUS: ADM IN  
[facility]
 [place] [address]
***END OF REPORT***

## 2019-01-20 NOTE — NUR
RESUMING PT CARE, PT LAYING IN BED WITH EYES CLOSED, RESPIRATIONS EVEN AND
UNALBORED, BED IS IN LOWEST POSITION [facility] RAILS UP X2, CALL LIGHT IN REACH.
WILL CONTINUE TO MONITOR AND FOLLOW PLAN OF CARE.

## 2019-01-21 VITALS — SYSTOLIC BLOOD PRESSURE: 168 MMHG | DIASTOLIC BLOOD PRESSURE: 60 MMHG

## 2019-01-21 VITALS — DIASTOLIC BLOOD PRESSURE: 71 MMHG | SYSTOLIC BLOOD PRESSURE: 179 MMHG

## 2019-01-21 VITALS — DIASTOLIC BLOOD PRESSURE: 46 MMHG | SYSTOLIC BLOOD PRESSURE: 141 MMHG

## 2019-01-21 LAB
ANION GAP SERPL CALC-SCNC: 8.9 MMOL/L (ref 8–16)
BASOPHILS NFR BLD AUTO: 0.2 % (ref 0–2)
BUN SERPL-MCNC: 28 MG/DL (ref 7–18)
CALCIUM SERPL-MCNC: 8.2 MG/DL (ref 8.5–10.1)
CHLORIDE SERPL-SCNC: 109 MMOL/L (ref 98–107)
CO2 SERPL-SCNC: 29.3 MMOL/L (ref 21–32)
CREAT SERPL-MCNC: 1.1 MG/DL (ref 0.6–1.3)
EOSINOPHIL NFR BLD: 2.8 % (ref 0–7)
ERYTHROCYTE [DISTWIDTH] IN BLOOD BY AUTOMATED COUNT: 17.4 % (ref 11.5–14.5)
GLUCOSE SERPL-MCNC: 119 MG/DL (ref 74–106)
HCT VFR BLD CALC: 26.6 % (ref 36–48)
HGB BLD-MCNC: 8.1 G/DL (ref 12–16)
IMM GRANULOCYTES NFR BLD: 0.9 % (ref 0–5)
LYMPHOCYTES NFR BLD AUTO: 11.2 % (ref 15–50)
MCH RBC QN AUTO: 31.5 PG (ref 26–34)
MCHC RBC AUTO-ENTMCNC: 30.5 G/DL (ref 31–37)
MCV RBC: 103.5 FL (ref 80–100)
MONOCYTES NFR BLD: 9 % (ref 2–11)
NEUTROPHILS NFR BLD AUTO: 75.9 % (ref 40–80)
OSMOLALITY SERPL CALC.SUM OF ELEC: 289 MOSM/KG (ref 275–300)
PLATELET # BLD: 384 10X3/UL (ref 130–400)
PMV BLD AUTO: 10 FL (ref 7.4–10.4)
POTASSIUM SERPL-SCNC: 5.2 MMOL/L (ref 3.5–5.1)
RBC # BLD AUTO: 2.57 10X6/UL (ref 4–5.4)
SODIUM SERPL-SCNC: 142 MMOL/L (ref 136–145)
WBC # BLD AUTO: 8.9 10X3/UL (ref 4.8–10.8)

## 2019-01-21 NOTE — NUR
BLOOD SUGAR , NO COVERAGE NEEDED PER S/S. PT ALSO STATED THAT SHE FEELS
A LOT BETTER AND CAN BREATH BETTER NOW THAT SHE IS IN BED. PT DENIES ANY NEEDS
AT THIS TIME. CALL LIGHT IN REACH, NAD NOTED, WILL CONTINUE TO MONITOR.

## 2019-01-21 NOTE — NUR
BLOOD SUGAR , NO COVERAGE NEEDED PER S/S. ALSO GAVE NYSTATIN AND LASIX
AS ORDERED. PT DENIES ANY NEEDS AT THIS TIME. CALL LIGHT IN REACH,NAD NOTED.

## 2019-01-21 NOTE — [facility]ARE
CASE MANAGEMENT DISCHARGE SUMMARY
 
 
PATIENT: CHANTAL TIPTON                        UNIT: [id number]
ACCOUNT#: [id number]                       ADM DATE: 19
AGE: 88     : [birth year]  SEX: F            ROOM/BED: D.2112    
AUTHOR: CHELE RAMON                             PHYSICIAN:                               
 
REFERRING PHYSICIAN: SHARATH TORRES MD               
DATE OF SERVICE: 19
Discharge Plan
 
 
Patient Name: CHANTAL TIPTON
Facility: [facility]:[place]
Encounter #: [id number]
Medical Record #: [id number]
: 1931
Planned Disposition: Skilled Nursing Facility
Anticipated Discharge Date: 19
 
Discharge Date: 
Expected LOS: 15
Initial Reviewer: [id number]
Initial Review Date: 2019
Generated: 19   9:21 am 
Comments
 
DCP- Discharge Planning
 
Updated by [id number]: Melany Olivares on 19   4:25 pm CT
CM RECEIVED AN ORDER REGARDING PLANS TO DISCHARGE TO HOME W/ HOSPITAL BED , 
TUBE FEEDING AND HOME HEALTH. PATIENT'S NUTRITION NOTE IS 19. WILL NEED 
NUTRITION NOTE REGARDING PATIENT NEEDS FOR DISCHARGE. CM UNDERSTANDS THE 
PATIENT DOES EAT AND TAKE HER MEDICATIONS BY MOUTH. SHE IS PRESENTLY ON 
FEEDINGS VIA  PUMP. SHE HAS BEEN ADVANCED TO 60 CC/HR TODAY WHICH IS HER GOAL 
RATE.  
SPOKE WITH PRIMARY ELIUD AND SHE REPORTS NO RESIDUAL AT THIS TIME.  
WILL NEED TO HAVE [facility] PROVIDE BED AND SPECIALTY MATTRESS  FOR HOME 
AND   
DETERMINE COVERAGE FOR TUBE FEEDING AND EQUIPMENT.  
CM TO FOLLOW UP IN AM.
DCP- Discharge Planning
 
Updated by [id number]: Austin Chambers on 19   7:14 am CT
Patient Name:  CHANTAL TIPTON   
Encounter No:  [id number]   
:  1931   
Primary Insurance:  HU[employer]NA CHOICE PPO MCR ADVANT  
Anticipated DC Date: 2019   
 
Planned Disposition:  Skilled Nursing Facility  
External Planned Provider: [facility], MEDICARE REHAB BED 
  
  
DCP follow-up note: CM FAXED UPDATE TO [facility], 
[phone] FOR REHAB REQUEST.  
  
CM WAITING ADMISSION DETERMINATION FROM [facility] FOR 
REHAB PLACEMENT.  
  
[facility] CASE MANAGEMENT
DCP- Discharge Planning
 
Updated by [id number]: Austin Chambers on 19   3:46 pm CT
Patient Name:  CHANTAL TIPTON   
Encounter No:  [id number]   
:  1931   
Primary Insurance:  HU[employer] CHOICE PPO MCR ADVANT  
Anticipated DC Date: 2019   
Planned Disposition:  Skilled Nursing Facility  
External Planned Provider:  [facility], MEDICARE REHAB 
BED  
  
  
DCP follow-up note: CM MET WITH PT'S TIANNA MCKEON BY MARRIAGE, AMARI BERMUDEZ, 
[phone].  AMARI STATES THAT JULIANNE BERMUDEZ IS PT'S FIANCE.  PT HAS POWER 
OF  WITH [employer], JOEL SPOUSE, MAGED BERMUDEZ, WHO WORKS IN 
[employer].  AMARI WILL GET MAGED TO HOSPITAL ALONG WITH POWER OF  
PAPERWORK AS SOON AS POSSIBLE.  THEY ARE IN AGREEMENT WITH REHAB AT [facility], BUT [facility] MAY NOT ACCEPT AS PT HAS CONTINUED DECLINE.  [facility] TO COME TOMORROW TO EVALUATE PT FOR REHAB.  IF [facility] DOES 
NOT ACCEPT FOR REHAB, THEY ARE THINKING TO TAKE PT HOME TO [facility] HERE 
IN [place] AND AMARI ALONG WITH FAMILY WILL CARE FOR PT WITH HOME 
HOSPICE.  AMARI REPORTS THEY WILL MAKE THAT DECISION TOMORROW.  CM EXPLAINED 
THAT THE POWER OF  WILL HAVE TO BE IN AGREEMENT AND SIGN ANY NEEDED 
AUTHORIZATIONS FOR ENROLLMENT IN REHAB OR HOSPICE CARE.  AMARI REPORTS 
UNDERSTANDING, PROVIDED CELL PHONE NUMBER FOR MAGED BERMUDEZ, [phone].  
  
CM WAITING ADMISSION DETERMINATION FROM [facility] FOR 
REHAB PLACEMENT.  
  
[facility], CASE MANAGEMENT
 
DCP- Discharge Planning
 
Updated by [id number]: Austin Chambers on 19   3:34 pm CT
Patient Name:  CHANTAL TIPTON   
Encounter No:  [id number]   
:  1931   
Primary Insurance:  HU[employer] CHOICE PPO MCR ADVANT  
Anticipated DC Date: 2019   
Planned Disposition:  Skilled Nursing Facility  
 
[facility] 
  
  
DCP follow-up note: CM MET WITH PT AND SON/POA, JULIANNE BERMUDEZ, AT FAMILY 
REQUEST.  MR. BERMUDEZ REPORTS IT IS NOW TIME TO SEND REFERRAL TO [facility] 
FOR REHAB PLACEMENT.  IMPORTANT MESSAGE FROM MEDICARE PROVIDED AND EXPLAINED. 
  
[phone].   
  
CM WAITING ADMISSION DETERMINATION FROM [facility] FOR 
REHAB PLACEMENT.  
  
[facility], CASE MANAGEMENT
DCP- Discharge Planning
 
Updated by [id number]: Austin Luis Eduardo on 19   4:52 pm CT
Patient Name: CHANTAL TIPTON                                     
Admission Status: Elective   
Accout number: [id number]                              
Admission Date: 2019   
: 1931                                                        
Admission Diagnosis:ACUTE KIDNEY FAILURE, UNSPECIFIED   
Attending: SHARATH TORRES                                                
Current LOS:  2   
  
Anticipated DC Date:    
Planned Disposition: Skilled Nursing Facility   
Primary Insurance: [employer] CHOICE PPO MCR ADVANT   
PLANNED EXTERNAL PROVIDER:  [facility], MEDICARE REHAB 
BED  
  
Discharge Planning Comments:   
CM RECEIVED ORDER INDICATING PT WANTS NURSING HOME CARE.  CM MET WITH PT AND 
SON IN ROOM TO DISCUSS DISCHARGE PLANNING AND NEEDS. PT REPORTS THAT BEFORE 
GETTING SICK, SHE WAS LIVING AT HOME WITH HER SON, INDEPENDENT IN HER CARE.  
PT REPORTS SHE DID TELL THE DOCTOR THAT SHE WOULD BE BETTER OFF IN A NURSING 
HOME BEFORE FAMILY ARRIVED TODAY.  PT WANTS REHAB, NOT LONG TERM CARE.   PT 
HAS NO MEDICAL EQUIPMENT AND NO OUTSIDE SERVICES ASSISTING IN THE HOME. CM 
DISCUSSED AVAILABILITY OF HOME HEALTH, REHAB SERVICES AND MEDICAL EQUIPMENT. 
PT'S SON INITIALLY DECLINED TO PARTICIPATE IN DISCHARGE PLANNING REPORTING PT 
WAS SENT TO REHAB DOWNSTAIRS TOO SOON WHEN SHE WAS NOT ABLE TO WALK OR EVEN 
FEED HERSELF. SON WANTS PT CONSIDERED FOR INPATIENT REHAB AGAIN STATING THEY 
SENT HER BACK HERE BECAUSE SHE USED ALL OF HER DAYS DOWN THERE.  PT DOES NOT 
THINK SHE CAN PARTICIPATE IN THREE HOURS OF PROGRESSIVE THERAPY PER DAY.  CM 
EXPLAINED THAT IF PT IS NOT ABLE TO PARTICIPATE IN THE THREE HOURS OF 
PROGRESSIVE THERAPY, IS NOT ABLE TO STAND OR FEED HERSELF, SHE IS NOT GOING 
TO BE APPROPRIATE FOR READMISSION TO INPATENT REHAB.  CM DISCUSSED 
AVAILABILITY OF SKILLED NURSING REHAB SERVICES AND PROVIDED CHOICE FORM.  
PT'S SON REPORTS HE ALREADY SIGNED ONE OF THESE FOR [facility] THE LAST 
VISIT AND SPEAKING WITH THIS CM.  CM EXPLAINED A NEW ONE WAS NEEDED.  PT'S 
SON SIGNED THE FORM AND DOES NOT WANT PT SENT TO REHAB BEFORE SHE IS STABLE.  
CM EXPLAINED THAT ALL DOCTORS WOULD HAVE TO INDICATE PT IS READY TO DISCHARGE 
 
AND CM IS ONLY WANTING TO BE PROACTIVE ON PT'S BEHALF FOR DISCHARGE PLANNING 
AND SECURE REHAB BED AHEAD OF TIME TO ENSURE THAT PT IS ABLE TO GET INTO [facility] WHEN DISCHARGED AS [facility] IS POPULAR REHAB AND DOES FILL UP.  
PT'S SON WILL WANTS PT TO BE STABLE FOR DISCHARGE FOR CM TO SEND REFERRALS.  
CHOICE SIGNED. CM PROVIDED PT'S SON WITH CM CONTACT INFORMATION.  
  
CM TO SEND REFERRAL TO [facility] WHEN PROJECTED 
DISCHARGE DATE IS KNOWN, AS PT'S SON DOES NOT WANT REHAB REFERRAL SENT OUT 
PRIOR TO PT'S MEDICAL STABILITY FOR DISCHARGE TO REHAB.  
  
: Austin Chambers
 
 DCPIA - Discharge Planning Initial Assessment
 
Updated by [id number]: Austin Chambers on 19   5:43 pm
*  Is the patient Alert and Oriented?
Yes
*  How many steps to enter\exit or inside your home? NONE *  PCP DR. MCCALL *  Pharmacy
[facility]
*  Preadmission Environment
Acute Inpatient Rehab
*  Facility Name
[facility] INPATIENT REHAB
*  ADLs
Total Dependent
*  Equipment
None
*  Other Equipment
NO MEDICAL EQUIPMENT PROVIDER PREFERECE
*  List name and contact numbers for known caregivers / representatives who 
currently or will assist patient after discharge:
NATE KUNZ/POA, [phone]
*  Verbal permission to speak to the caregivers and representatives has been 
obtained from the patient.
Yes
*  Community resources currently utilized
None
*  Please name any agencies selected above.
NONE
*  Additional services required to return to the preadmission environment?
Yes
*  Can the patient safely return to the preadmission environment?
Yes
*  Has this patient been hospitalized within the prior 30 days at any 
hospital?
Yes
 
 
 
 
 
Coverage Notice
 
 
Reviewer: [email or web address] Gopi Chambers
 
Notice Issued Date-Time: 2019  13:00
Notice Type: Patient Choice Letter
 
Notice Delivered To: Family Member
Relationship to Patient: Son
Representative Name: JULIANNE BERMUDEZ
 
Delivery Method: HAND - Hand Delivered
Ariadne Days:
Prior Verbal Notification: 
 
Recipient Understood Notice: Yes
Recipient Signature: Yes
Med Rec Note Co-signed by Attending:
 
Coverage Notice Comment:  [facility]
Reviewer: [email or web address] Gopi Chambers
 
Notice Issued Date-Time: 2019  12:20
Notice Type: IM Discharge Notice
 
Notice Delivered To: Family Member
Relationship to Patient: Son
Representative Name: JULIANNE BERMUDEZ
 
Delivery Method: HAND - Hand Delivered
Ariadne Days:
Prior Verbal Notification: 
 
Recipient Understood Notice: Yes
Recipient Signature: Yes
Med Rec Note Co-signed by Attending:
 
Coverage Notice Comment:  
 
Last DP export: 19   4:26 p
Patient Name: CHANTAL TIPTON
 
Encounter #: [id number]
Page [id number]
 
 
 
 
 
Electronically Signed by CHELE RAMON on 19 at 0822
 
 
 
 
 
 
**All edits/amendments must be made on the electronic document**
 
DICTATION DATE: 19     : KARY  19     
RPT#: [id number]                                DC DATE:        
                                               STATUS: ADM IN  
[facility]
 [place] [address]
***END OF REPORT***

## 2019-01-21 NOTE — MORECARE
CASE MANAGEMENT DISCHARGE SUMMARY
 
 
PATIENT: CHANTAL TIPTON                        UNIT: [id number]
ACCOUNT#: [id number]                       ADM DATE: 19
AGE: 88     : [birth year]  SEX: F            ROOM/BED: D.2112    
AUTHOR: CHELE RAMON                             PHYSICIAN:                               
 
REFERRING PHYSICIAN: SHARATH TORRES MD               
DATE OF SERVICE: 19
Discharge Plan
 
 
Patient Name: CHANTAL TIPTON
Facility: [facility]
Encounter #: [id number]
Medical Record #: [id number]
: 1931
Planned Disposition: Skilled Nursing Facility
Anticipated Discharge Date: 19
 
Discharge Date: 
Expected LOS: 15
Initial Reviewer: [id number]
Initial Review Date: 2019
Generated: 19   1:53 pm 
Comments
 
DCP- Discharge Planning
 
Updated by [id number]: Marli Vickers on 19  11:53 am CT
RECEIVED NOTICE THAT THE PATIENTS FAMILY WAS WANTING HER TO HAVE THE 
KYPHOPLASTY BEFORE SHE LEAVES THE HOSPITAL. IT WAS MENTIONED THAT THE 
PATIENTS POA HAS NOT BEEN HER AND THERE IS NO POA PAPERWORK IN THE CHART. I 
ASKED THE BEDSIDE NURSE PENG ENCARNACION TO COME INTO THE ROOM WITH ME TO DISCUSS 
THE NEED FOR POA PAPERWORK IN ORDER TO HAVE ANY CONSENTS FOR PROCEDURES 
SIGNED SINCE THE PATIENT WAS NOT ABLE TO SIGN HERSELF, AND BY THE HIERACHY OF 
LAW, THE POA OR NEXT OF KIN WOULD BE WHO NEEDED TO SIGN AND SINCE THEY (THE 
LADY AND FIJUVENTINO) WERE NOT BLOOD RELATED, THEY COULD NOT SIGN FOR HER AS THEY 
HAVE BEEN. AT THIS TIME I WAS QUICKLY TOLD THAT SHE WAS IN HER RIGHT MIND AND 
ABLE TO MAKE ALL HER OWN DECISIONS. THE LADY AT BEDSIDE PROCEEDED TO TELL ME 
ABOUT ALL THE TIMES PEOPLE CAME IN AND QUESTIONED HER ABOUT THE DATE, TIME, 
PRESIDENT, ETC AND SHE WAS ABLE TO ALWAYS ANSWER ALL THEIR QUESTIONS. SHE 
STATED THE PATIENT WAS ABLE TO SIGN FOR HERSELF. UP TO THIS POINT, THE 
PATIENT HAD NOT OPENED HER EYES AND HAD NOT SAID ANYTHING. IT TOOK ME 
TOUCHING THE PATIENT'S ARM TO GET HER TO OPEN HER EYES AND ACKNOWLEDGE MY 
PRESENCE AND ANSWER MY QUESTIONS. THE PATIENT IS WITH IT AND ABLE TO ANSWER 
QUESTIONS. AT THIS POINT I ASKED HER IF SHE WANTED TO HAVE THE KYPHOPLASTY 
DONE. SHE STATED YES. THE LADY AT BEDSIDE BECAME VERY AGITATED STATING THAT 
SHE KNEW ABOUT HIPPA LAWS AND WANTED TO KNOW WHY ANYONE EVEN ALLOWED THINGS 
 
TO HAPPEN UP UNTIL NOW. NO ONE HAD ASKED FOR ANYONE'S DRIVERS LICENES TO 
VERIFY THEY WERE WHO THEY SAID THEY WERE. I TRIED TO EXPLAINE THAT EVEN WITH 
THAT, WE HAD NO WAY TO ACTUALLY VERIFY THAT WITH A DRIVERS LICENSE BECAUSE 
ANYONE CAN SAY THEY ARE SOMEONES FAMILY AND WE DON'T KNOW OTHERWISE UNLESS 
THE PATIENT SAYS SO, BUT SHE CUT ME OFF AS I WAS TALKING. I STOOD AT BEDSIDE 
AND LISTENED TO HER RANT ABOUT ALL THE THINGS THAT SHE HAS WITNESSED HERE AT 
THIS HOSPITAL THAT HAS MADE HER SICK TO HER STOMACH ESPECIALLY ABOUT THE FACT 
THAT SHE CAME IN HERE WITH A HURT BACK AND NOW THEY ARE JUST WATCHING HER DIE.
 I APOLOGIZED TO HER ABOUT ALL OF THE THINGS THAT  WAS UPSET ABOUT, AND 
THEN DIRECTED MY CONVERSATION TO THE PATIENT, EXPLAINING THAT IF SHE WANTED 
THEY KYPHOPLASTY AND HER URINE WAS CLEAR THAT I WOULD EXPLAIN THIS TO THE 
NURSE PRACTITIONER. THAT ULTIMATELY I AM HERE TO TAKE CARE OF HER AND MAKE 
SURE WE ARE DOING WHAT SHE WANTS. I ALSO EXPLAINED TO HER THAT SHE NEEDS TO 
SIGN ALL OF HER OWN PAPERWORK, EVEN IF IT IS JUST WITH AN "X". THAT WE SIGN 
AS WITNESSES TO STATE WE KNOW SHE UNDERSTANDS AND SHE IS SIGNING. EXPLAINED 
THAT AS LONG AS SHE IS ABLE TO MAKE ALL HER OWN DECISIONS, WE DID NOT NEED 
HER POA PAPERWORK. SHE IS IN AGREEMENT. THE GREAT NEPHEWS WIFE WHO IS AT 
BEDSIDE CONTINUED WITH HER RANT, I AGAIN APOLOGIZED AND THE BEDSIDE NURSE AND 
I LEFT THE ROOM. I EXPLAINED TO SCOTTIE THAT THE PATIENT WAS CURRENTLY IN HER 
RIGHT MIND AND SHE IS WANTING THEY KYPHOPLASTY, AND SHE RECONSULTED IR TO SEE 
THE PATIENT.
DCP- Discharge Planning
 
Updated by [id number]: Melany Olivares on 19   4:25 pm CT
CM RECEIVED AN ORDER REGARDING PLANS TO DISCHARGE TO HOME W/ HOSPITAL BED , 
TUBE FEEDING AND HOME HEALTH. PATIENT'S NUTRITION NOTE IS 19. WILL NEED 
NUTRITION NOTE REGARDING PATIENT NEEDS FOR DISCHARGE. CM UNDERSTANDS THE 
PATIENT DOES EAT AND TAKE HER MEDICATIONS BY MOUTH. SHE IS PRESENTLY ON 
FEEDINGS VIA  PUMP. SHE HAS BEEN ADVANCED TO 60 CC/HR TODAY WHICH IS HER GOAL 
RATE.  
SPOKE WITH LIYAH KLINE AND SHE REPORTS NO RESIDUAL AT THIS TIME.  
WILL NEED TO HAVE [facility] BED AND SPECIALTY MATTRESS  FOR HOME 
AND   
DETERMINE COVERAGE FOR TUBE FEEDING AND EQUIPMENT.  
CM TO FOLLOW UP IN AM.
DCP- Discharge Planning
 
Updated by [id number]: Albaro Chambers on 19   7:14 am CT
Patient Name:  CHANTAL TIPTON   
Encounter No:  [id number]   
:  1931   
Primary Insurance:  [employer] CHOICE PPO MCR ADVANT  
Anticipated DC Date: 2019   
Planned Disposition:  Skilled Nursing Facility  
External Planned Provider: [facility], MEDICARE REHAB BED 
  
  
DCP follow-up note: CM FAXED UPDATE TO [facility], 
[phone] FOR REHAB REQUEST.  
  
CM WAITING ADMISSION DETERMINATION FROM [facility] FOR 
REHAB PLACEMENT.  
 
  
ALBARO CHAMBERS, CASE MANAGEMENT
DCP- Discharge Planning
 
Updated by [id number]: Albaro Chambers on 19   3:46 pm CT
Patient Name:  CHANTAL TIPTON   
Encounter No:  [id number]   
:  1931   
Primary Insurance:  [employer] CHOICE PPO MCR ADVANT  
Anticipated DC Date: 2019   
Planned Disposition:  Skilled Nursing Facility  
External Planned Provider:  [facility], MEDICARE REHAB 
BED  
  
  
DCP follow-up note: CM MET WITH PT'S GREAT NEAMOS BY MARRIAGE, AMARI BERMUDEZ, 
[phone].  AMARI STATES THAT JULIANNE BERMUDEZ IS PT'S FIANCE.  PT HAS POWER 
OF  WITH GREAT NEPHEW, JOEL SPOUSE, MAGED BERMUDEZ, WHO WORKS IN 
[employer].  AMARI WILL GET MAGED TO HOSPITAL ALONG WITH POWER OF  
PAPERWORK AS SOON AS POSSIBLE.  THEY ARE IN AGREEMENT WITH REHAB AT [facility], BUT [facility] MAY NOT ACCEPT AS PT HAS CONTINUED DECLINE.  [facility] TO COME TOMORROW TO EVALUATE PT FOR REHAB.  IF [facility] DOES 
NOT ACCEPT FOR REHAB, THEY ARE THINKING TO TAKE PT HOME TO [facility] HERE 
IN [place] AND AMARI ALONG WITH FAMILY WILL CARE FOR PT WITH HOME 
HOSPICE.  AMARI REPORTS THEY WILL MAKE THAT DECISION TOMORROW.  CM EXPLAINED 
THAT THE POWER OF  WILL HAVE TO BE IN AGREEMENT AND SIGN ANY NEEDED 
AUTHORIZATIONS FOR ENROLLMENT IN REHAB OR HOSPICE CARE.  AMARI REPORTS 
UNDERSTANDING, PROVIDED CELL PHONE NUMBER FOR MAGED BERMUDEZ, [phone].  
  
CM WAITING ADMISSION DETERMINATION FROM [facility] FOR 
REHAB PLACEMENT.  
  
ALBARO CHAMBERS CASE MANAGEMENT
 
DCP- Discharge Planning
 
Updated by [id number]: Albaro Chambers on 19   3:34 pm CT
Patient Name:  CHANTAL TIPTON   
Encounter No:  [id number]   
:  1931   
Primary Insurance:  [employer] CHOICE PPO MCR ADVANT  
Anticipated DC Date: 2019   
Planned Disposition:  Skilled Nursing Facility  
External Planned Provider: [facility], MEDICARE REHAB BED 
  
  
DCP follow-up note: CM MET WITH PT AND SON/POA, JULIANNE BERMUDEZ, AT FAMILY 
REQUEST.  MR. BERMUDEZ REPORTS IT IS NOW TIME TO SEND REFERRAL TO [facility] 
FOR REHAB PLACEMENT.  IMPORTANT MESSAGE FROM MEDICARE PROVIDED AND EXPLAINED. 
  
CM FAXED REFERRAL TO [facility], [phone].   
  
 
CM WAITING ADMISSION DETERMINATION FROM [facility] FOR 
REHAB PLACEMENT.  
  
NEERU VILLATORO
DCP- Discharge Planning
 
Updated by [id number]: Albaro Chambers on 19   4:52 pm CT
Patient Name: CHANTAL TIPTON                                     
Admission Status: Elective   
Accout number: [id number]                              
Admission Date: 2019   
: 1931                                                        
Admission Diagnosis:ACUTE KIDNEY FAILURE, UNSPECIFIED   
Attending: SHARATH TORRES                                                
Current LOS:  2   
  
Anticipated DC Date:    
Planned Disposition: Skilled Nursing Facility   
Primary Insurance: [employer] CHOICE PPO MCR ADVANT   
PLANNED EXTERNAL PROVIDER:  [facility], MEDICARE REHAB 
BED  
  
Discharge Planning Comments:   
CM RECEIVED ORDER INDICATING PT WANTS NURSING HOME CARE.  CM MET WITH PT AND 
SON IN ROOM TO DISCUSS DISCHARGE PLANNING AND NEEDS. PT REPORTS THAT BEFORE 
GETTING SICK, SHE WAS LIVING AT HOME WITH HER SON, INDEPENDENT IN HER CARE.  
PT REPORTS SHE DID TELL THE DOCTOR THAT SHE WOULD BE BETTER OFF IN A NURSING 
HOME BEFORE FAMILY ARRIVED TODAY.  PT WANTS REHAB, NOT LONG TERM CARE.   PT 
HAS NO MEDICAL EQUIPMENT AND NO OUTSIDE SERVICES ASSISTING IN THE HOME. CM 
DISCUSSED AVAILABILITY OF HOME HEALTH, REHAB SERVICES AND MEDICAL EQUIPMENT. 
PT'S SON INITIALLY DECLINED TO PARTICIPATE IN DISCHARGE PLANNING REPORTING PT 
WAS SENT TO REHAB DOWNSTAIRS TOO SOON WHEN SHE WAS NOT ABLE TO WALK OR EVEN 
FEED HERSELF. SON WANTS PT CONSIDERED FOR INPATIENT REHAB AGAIN STATING THEY 
SENT HER BACK HERE BECAUSE SHE USED ALL OF HER DAYS DOWN THERE.  PT DOES NOT 
THINK SHE CAN PARTICIPATE IN THREE HOURS OF PROGRESSIVE THERAPY PER DAY.  CM 
EXPLAINED THAT IF PT IS NOT ABLE TO PARTICIPATE IN THE THREE HOURS OF 
PROGRESSIVE THERAPY, IS NOT ABLE TO STAND OR FEED HERSELF, SHE IS NOT GOING 
TO BE APPROPRIATE FOR READMISSION TO INPATENT REHAB.  CM DISCUSSED 
AVAILABILITY OF SKILLED NURSING REHAB SERVICES AND PROVIDED CHOICE FORM.  
PT'S SON REPORTS HE ALREADY SIGNED ONE OF THESE FOR [facility] THE LAST 
VISIT AND SPEAKING WITH THIS CM.  CM EXPLAINED A NEW ONE WAS NEEDED.  PT'S 
SON SIGNED THE FORM AND DOES NOT WANT PT SENT TO REHAB BEFORE SHE IS STABLE.  
CM EXPLAINED THAT ALL DOCTORS WOULD HAVE TO INDICATE PT IS READY TO DISCHARGE 
AND CM IS ONLY WANTING TO BE PROACTIVE ON PT'S BEHALF FOR DISCHARGE PLANNING 
AND SECURE REHAB BED AHEAD OF TIME TO ENSURE THAT PT IS ABLE TO GET INTO [facility] WHEN DISCHARGED AS [facility] IS POPULAR REHAB AND DOES FILL UP.  
PT'S SON WILL WANTS PT TO BE STABLE FOR DISCHARGE FOR CM TO SEND REFERRALS.  
CHOICE SIGNED. CM PROVIDED PT'S SON WITH CM CONTACT INFORMATION.  
  
CM TO SEND REFERRAL TO [facility] WHEN PROJECTED 
DISCHARGE DATE IS KNOWN, AS PT'S SON DOES NOT WANT REHAB REFERRAL SENT OUT 
PRIOR TO PT'S MEDICAL STABILITY FOR DISCHARGE TO REHAB.  
 
  
: Albaro Chambers
 
 DCPIA - Discharge Planning Initial Assessment
 
Updated by [id number]: Albaro Chambers on 19   5:43 pm
*  Is the patient Alert and Oriented?
Yes
*  How many steps to enter\exit or inside your home? NONE *  PCP DR. MCCALL *  Pharmacy
[facility]
*  Preadmission Environment
Acute Inpatient Rehab
*  Facility Name
[facility] INPATIENT REHAB
*  ADLs
Total Dependent
*  Equipment
None
*  Other Equipment
NO MEDICAL EQUIPMENT PROVIDER PREFERECE
*  List name and contact numbers for known caregivers / representatives who 
currently or will assist patient after discharge:
JULIANNE BERMUDEZ, SON/POA, [phone]
*  Verbal permission to speak to the caregivers and representatives has been 
obtained from the patient.
Yes
*  Community resources currently utilized
None
*  Please name any agencies selected above.
NONE
*  Additional services required to return to the preadmission environment?
Yes
*  Can the patient safely return to the preadmission environment?
Yes
*  Has this patient been hospitalized within the prior 30 days at any 
hospital?
Yes
 
 
 
 
 
Coverage Notice
 
Reviewer: [id number] - Albaro Chambers
 
Notice Issued Date-Time: 2019  13:00
Notice Type: Patient Choice Letter
 
Notice Delivered To: Family Member
Relationship to Patient: Son
Representative Name: JULIANNE BERMUDEZ
 
 
Delivery Method: HAND - Hand Delivered
Ariadne Days:
Prior Verbal Notification: 
 
Recipient Understood Notice: Yes
Recipient Signature: Yes
Med Rec Note Co-signed by Attending:
 
Coverage Notice Comment:  [facility]
Reviewer: [id number] Gopi Chambers
 
Notice Issued Date-Time: 2019  12:20
Notice Type: IM Discharge Notice
 
Notice Delivered To: Family Member
Relationship to Patient: Son
Representative Name: JULIANNE BERMUDEZ
 
Delivery Method: HAND - Hand Delivered
Ariadne Days:
Prior Verbal Notification: 
 
Recipient Understood Notice: Yes
Recipient Signature: Yes
Med Rec Note Co-signed by Attending:
 
Coverage Notice Comment:  
 
Last DP export: 19   7:21 a
Patient Name: CHANTAL TIPTON
Encounter #: [id number]
Page [id number]
 
 
 
 
 
Electronically Signed by CHELE RAMON on 19 at 1253
 
 
 
 
 
 
**All edits/amendments must be made on the electronic document**
 
DICTATION DATE: 19 1253     : KARY  19 1253     
RPT#: [id number]                                DC DATE:        
                                               STATUS: ADM IN  
[facility]
[address] [place] [address]
***END OF REPORT***

## 2019-01-21 NOTE — NUR
Nutrition follow-up:
Pt with regular diet; po intake of some meals noted.  Pt ate all her chicken
pot pie today.
Jevity 1.2 aubrey infusing @ 50 ml/hr
Labs reviewed
Wt: 149#
If po intake continues to improve man need to adjust TF rate.
RDN following.

## 2019-01-21 NOTE — MORECARE
CASE MANAGEMENT DISCHARGE SUMMARY
 
 
PATIENT: CHANTAL TIPTON                        UNIT: [id number]
ACCOUNT#: [id number]                       ADM DATE: 19
AGE: 88     : [birth year]  SEX: F            ROOM/BED: D.2112    
AUTHOR: [email or web address]                             PHYSICIAN:                               
 
REFERRING PHYSICIAN: SHARATH TORRES MD               
DATE OF SERVICE: 19
Discharge Plan
 
 
Patient Name: CHANTAL TIPTON
Facility: [facility]
Encounter #: [id number]
Medical Record #: [id number]
: 1931
Planned Disposition: Skilled Nursing Facility
Anticipated Discharge Date: 19
 
Discharge Date: 
Expected LOS: 15
Initial Reviewer: [id number]
Initial Review Date: 2019
Generated: 19   6:33 pm 
Comments
 
DCP- Discharge Planning
 
Updated by [id number]: Albaro Chambers on 19   4:27 pm CT
Patient Name:  CHANTAL TIPTON   
Encounter No:  [id number]   
:  1931   
Primary Insurance:  [employer] CHOICE PPO [employer] ADVANT  
Anticipated DC Date: 2019   
Planned Disposition:  HOME WITH HOME HEALTH  
External Planned Provider: [facility]  
  
  
DCP follow-up note: CM RECEIVED ORDER FOR HOME HEALTH, TUBE FEEDING AND 
HOSPITAL BED.  CM MET WITH PT AND GRAND NEPHEW'S SPOUSE IN ROOM.  CM BEGAN 
DISCUSSING ORDER, PT'S GRAND NEPHEW'S SPOUSE INFORMED CM THAT PT PLANS TO 
DISCHARGE TO [place] FOR REHAB AND ASKED IF CM HAS HEARD FROM [place] AS SHE DID NOT SEE THEM FRIDAY AS THEY WERE SUPPOSED TO EVALUATE PT 
THEN.  CM CALLED [facility], SPOKE TO LATONYA WHO WILL 
CHECK WITH MIRANDA TO FIND OUT WHAT HAS HAPPENED.  LATONYA WILL HAVE MIRANDA CALL 
CM AS SOON AS POSSIBLE.    
CM MET WITH PT, PT'S ELIAS AND PT'S GRAND NEPHEW'S NIECE.  PT'S ELIAS 
INFORMED CM THAT CM DOES NOT NEED TO SEE THEM AND THAT DR. LEPE HAS 
 
TAKEN CARE OF EVERYTHING. CM ASKED WHAT HAD BEEN WORKED OUT.  CM WAS ADVISED 
THAT PT WILL HAVE THE KYPHOPLASTY ON WEDNESDAY AND THEN GO TO [place] 
FOR REHAB, DR. LEPE WILL TALK TO MIRANDA AT [place] AND WORK THIS 
OUT.  CM NOTIFIED LIZZY EMANUEL AND DR. LEPE.    
  
CM RECEIVED CALL BACK FROM MIRANDA WHO SPOKE TO FAMILY, INFORMED THEM THAT PT 
IS NOT APPROPRIATE CANDIDATE FOR REHAB AS SHE IS REFUSING THERAPY SERICES AND 
 INFORMED CM THAT FAMILY HAS REFUSED LONG TERM CARE PLACEMENT AT [facility].    
  
CM MET WITH PT AND PT'S GRAND NEPHEWS SPOUSE IN ROOM.  WHITNEY DISCUSSED IMPORTANT 
MESSAGE FROM MEDICARE AND DISCUSSED HOW TO COMPLAIN IF NEEDED TO HOUSE 
SUPERVISOR, ADMINISTRATION AND IF NEEDED, QUALITY IMPROVEMENT OFFICE FOR 
MEDICARE.  AMARI INFORMED CM THAT SHE HAS COMPLAINED TO THE DOCTORS BUT HAS 
BEEN THINKING SHE NEEDS TO COMPLAIN TO SOMEONE ELSE. CM DISCUSSED DISCHARGE 
PLAN.  AMARI HAS TALKED TO PT, PT'S ELIAS AND MIRANDA AT [place].  THEY 
ARE NOT GOING TO PUT PT INTO LONG TERM CARE AND WILL BE TAKING PT HOME AS 
DISCUSSED LAST WEEK; AMARI STATES PT IS NOT READY FOR HOSPICE BUT WANTS HOME 
HEALTH.  HOME HEALTH LISTING PROVIDED AND DISCUSSED, AMARI WANTS TO USE THE 
HIGHEST RATED COMPANY, PT TOLD AMARI TO SIGN THE FORMS.  AMARI SIGNED THE 
IMPORTANT MESSAGE FROM MEDICARE AND CHOICE FOR [employer] [facility].  AMARI 
STATES THAT THEY WILL NEED A HOSPITAL BED, BEDSIDE COMMODE, OXYGEN AND TUBE 
FEEDING SET UP FOR PT AT HOME. WHITNEY EXPLAINED THAT PT'S INSURANCE HAS TO PRE 
AUTHORIZE ALL MEDICAL EQUIPMENT AND THAT SINCE PT IS ABLE TO EAT, INSURANCE 
MAY NOT COVER TUBE FEEDING.  THEY HAVE NO PREFERENCE ON PROVIDER AND AMARI 
ASKED TO BE CONTACTED REGARDING ANY COPAYS FOR EQUIPMENT [phone].  
  
CM HAS ORDER FOR HOME HEALTH, HOSPITAL BED AND TUBE FEEDING.  CM WILL NEED 
ADDITIONAL ORDERS FOR BEDSIDE COMMODE AND OXYGEN TESTING.    
  
CM TO FIND IN NETWORK PROVIDER FOR PT'S INSURANCE TO ARRANGE NEEDED MEDICAL 
EQUIPMENT AS SOON AS POSSIBLE.  CM TO COMPLETE HOME HEALTH ARRANGEMENTS WITH 
[employer].  
  
Albaro Chambers
DCP- Discharge Planning
 
Updated by [id number]: Marli Vickers on 19  11:53 am CT
RECEIVED NOTICE THAT THE PATIENTS FAMILY WAS WANTING HER TO HAVE THE 
KYPHOPLASTY BEFORE SHE LEAVES THE HOSPITAL. IT WAS MENTIONED THAT THE 
PATIENTS POA HAS NOT BEEN HER AND THERE IS NO POA PAPERWORK IN THE CHART. I 
ASKED THE BEDSIDE NURSE PENG ENCARNACION TO COME INTO THE ROOM WITH ME TO DISCUSS 
THE NEED FOR POA PAPERWORK IN ORDER TO HAVE ANY CONSENTS FOR PROCEDURES 
SIGNED SINCE THE PATIENT WAS NOT ABLE TO SIGN HERSELF, AND BY THE HIERACHY OF 
LAW, THE POA OR NEXT OF KIN WOULD BE WHO NEEDED TO SIGN AND SINCE THEY (THE 
LADY AND ELIAS) WERE NOT BLOOD RELATED, THEY COULD NOT SIGN FOR HER AS THEY 
HAVE BEEN. AT THIS TIME I WAS QUICKLY TOLD THAT SHE WAS IN HER RIGHT MIND AND 
ABLE TO MAKE ALL HER OWN DECISIONS. THE LADY AT BEDSIDE PROCEEDED TO TELL ME 
ABOUT ALL THE TIMES PEOPLE CAME IN AND QUESTIONED HER ABOUT THE DATE, TIME, 
PRESIDENT, ETC AND SHE WAS ABLE TO ALWAYS ANSWER ALL THEIR QUESTIONS. SHE 
STATED THE PATIENT WAS ABLE TO SIGN FOR HERSELF. UP TO THIS POINT, THE 
PATIENT HAD NOT OPENED HER EYES AND HAD NOT SAID ANYTHING. IT TOOK ME 
 
TOUCHING THE PATIENT'S ARM TO GET HER TO OPEN HER EYES AND ACKNOWLEDGE MY 
PRESENCE AND ANSWER MY QUESTIONS. THE PATIENT IS WITH IT AND ABLE TO ANSWER 
QUESTIONS. AT THIS POINT I ASKED HER IF SHE WANTED TO HAVE THE KYPHOPLASTY 
DONE. SHE STATED YES. THE LADY AT BEDSIDE BECAME VERY AGITATED STATING THAT 
SHE KNEW ABOUT HIPPA LAWS AND WANTED TO KNOW WHY ANYONE EVEN ALLOWED THINGS 
TO HAPPEN UP UNTIL NOW. NO ONE HAD ASKED FOR ANYONE'S DRIVERS LICENES TO 
VERIFY THEY WERE WHO THEY SAID THEY WERE. I TRIED TO EXPLAINE THAT EVEN WITH 
THAT, WE HAD NO WAY TO ACTUALLY VERIFY THAT WITH A DRIVERS LICENSE BECAUSE 
ANYONE CAN SAY THEY ARE SOMEONES FAMILY AND WE DON'T KNOW OTHERWISE UNLESS 
THE PATIENT SAYS SO, BUT SHE CUT ME OFF AS I WAS TALKING. I STOOD AT BEDSIDE 
AND LISTENED TO HER RANT ABOUT ALL THE THINGS THAT SHE HAS WITNESSED HERE AT 
THIS HOSPITAL THAT HAS MADE HER SICK TO HER STOMACH ESPECIALLY ABOUT THE FACT 
THAT SHE CAME IN HERE WITH A HURT BACK AND NOW THEY ARE JUST WATCHING HER DIE.
 I APOLOGIZED TO HER ABOUT ALL OF THE THINGS THAT [facility] WAS UPSET ABOUT, AND 
THEN DIRECTED MY CONVERSATION TO THE PATIENT, EXPLAINING THAT IF SHE WANTED 
THEY KYPHOPLASTY AND HER URINE WAS CLEAR THAT I WOULD EXPLAIN THIS TO THE 
NURSE PRACTITIONER. THAT ULTIMATELY I AM HERE TO TAKE CARE OF HER AND MAKE 
SURE WE ARE DOING WHAT SHE WANTS. I ALSO EXPLAINED TO HER THAT SHE NEEDS TO 
SIGN ALL OF HER OWN PAPERWORK, EVEN IF IT IS JUST WITH AN "X". THAT WE SIGN 
AS WITNESSES TO STATE WE KNOW SHE UNDERSTANDS AND SHE IS SIGNING. EXPLAINED 
THAT AS LONG AS SHE IS ABLE TO MAKE ALL HER OWN DECISIONS, WE DID NOT NEED 
HER POA PAPERWORK. SHE IS IN AGREEMENT. THE GREAT NEPHEWS WIFE WHO IS AT 
BEDSIDE CONTINUED WITH HER RANT, I AGAIN APOLOGIZED AND THE BEDSIDE NURSE AND 
I LEFT THE ROOM. I EXPLAINED TO SCOTTIE THAT THE PATIENT WAS CURRENTLY IN HER 
RIGHT MIND AND SHE IS WANTING THEY KYPHOPLASTY, AND SHE RECONSULTED IR TO SEE 
THE PATIENT.
DCP- Discharge Planning
 
Updated by [id number]: Melany Olivares on 19   4:25 pm CT
CM RECEIVED AN ORDER REGARDING PLANS TO DISCHARGE TO HOME W/ HOSPITAL BED , 
TUBE FEEDING AND HOME HEALTH. PATIENT'S NUTRITION NOTE IS 19. WILL NEED 
NUTRITION NOTE REGARDING PATIENT NEEDS FOR DISCHARGE. CM UNDERSTANDS THE 
PATIENT DOES EAT AND TAKE HER MEDICATIONS BY MOUTH. SHE IS PRESENTLY ON 
FEEDINGS VIA  PUMP. SHE HAS BEEN ADVANCED TO 60 CC/HR TODAY WHICH IS HER GOAL 
RATE.  
SPOKE WITH PRIMARY ELIUD AND SHE REPORTS NO RESIDUAL AT THIS TIME.  
WILL NEED TO HAVE [facility] PROVIDE BED AND SPECIALTY MATTRESS  FOR HOME 
AND   
DETERMINE COVERAGE FOR TUBE FEEDING AND EQUIPMENT.  
CM TO FOLLOW UP IN AM.
DCP- Discharge Planning
 
Updated by [id number]: Albaro Chambres on 19   7:14 am CT
Patient Name:  CHANTAL TIPTON   
Encounter No:  [id number]   
:  1931   
Primary Insurance:  [employer] CHOICE PPO [facility] ADVANT  
Anticipated DC Date: 2019   
Planned Disposition:  Skilled Nursing Facility  
External Planned Provider: [facility], MEDICARE REHAB BED 
  
  
 
DCP follow-up note: CM FAXED UPDATE TO [facility], 
[phone] FOR REHAB REQUEST.  
  
CM WAITING ADMISSION DETERMINATION FROM [facility] FOR 
REHAB PLACEMENT.  
  
ALBARO CHAMBERS, CASE MANAGEMENT
DCP- Discharge Planning
 
Updated by [id number]: Albaro Chambers on 19   3:46 pm CT
Patient Name:  CHANTAL TIPTON   
Encounter No:  [id number]   
:  1931   
Primary Insurance:  [employer] CHOICE PPO [employer] ADVANT  
Anticipated DC Date: 2019   
Planned Disposition:  Skilled Nursing Facility  
External Planned Provider:  [facility], MEDICARE REHAB 
BED  
  
  
DCP follow-up note: CM MET WITH PT'S TIANNA MCKEON BY AMARI MYLES, 
[phone].  AMARI STATES THAT JULIANNE BERMUDEZ IS PT'S FIANCE.  PT HAS POWER 
OF  WITH GREAT NEPHEW, GINGERS SPOUSE, MAGED BERMUDEZ, WHO WORKS IN 
[employer].  AMARI WILL GET MAGED TO HOSPITAL ALONG WITH POWER OF  
PAPERWORK AS SOON AS POSSIBLE.  THEY ARE IN AGREEMENT WITH REHAB AT [place], BUT [place] MAY NOT ACCEPT AS PT HAS CONTINUED DECLINE.  [facility] [place] TO COME TOMORROW TO EVALUATE PT FOR REHAB.  IF [place] DOES 
NOT ACCEPT FOR REHAB, THEY ARE THINKING TO TAKE PT HOME TO [facility] HERE 
IN [place] AND AMARI ALONG WITH FAMILY WILL CARE FOR PT WITH HOME 
HOSPICE.  AMARI REPORTS THEY WILL MAKE THAT DECISION TOMORROW.  CM EXPLAINED 
THAT THE POWER OF  WILL HAVE TO BE IN AGREEMENT AND SIGN ANY NEEDED 
AUTHORIZATIONS FOR ENROLLMENT IN REHAB OR HOSPICE CARE.  AMARI REPORTS 
UNDERSTANDING, PROVIDED CELL PHONE NUMBER FOR MAGED BERMUDEZ, [phone].  
  
CM WAITING ADMISSION DETERMINATION FROM [facility] FOR 
REHAB PLACEMENT.  
  
ALBARO CHAMBERS, CASE MANAGEMENT
 
DCP- Discharge Planning
 
Updated by [id number]: Albaro Chambers on 19   3:34 pm CT
Patient Name:  CHANTAL TIPTON   
Encounter No:  [id number]   
:  1931   
Primary Insurance:  [employer] CHOICE PPO [employer] ADVANT  
Anticipated DC Date: 2019   
Planned Disposition:  Skilled Nursing Facility  
External Planned Provider: [facility], MEDICARE REHAB BED 
  
  
DCP follow-up note: CM MET WITH PT AND SON/POA, JULIANNE BERMUDEZ, AT FAMILY 
 
REQUEST.  MR. BERMUDEZ REPORTS IT IS NOW TIME TO SEND REFERRAL TO [place] 
FOR REHAB PLACEMENT.  IMPORTANT MESSAGE FROM MEDICARE PROVIDED AND EXPLAINED. 
  
[phone]   
  
CM WAITING ADMISSION DETERMINATION FROM [facility] FOR 
REHAB PLACEMENT.  
  
ALBARO CHAMBERS, CASE MANAGEMENT
DCP- Discharge Planning
 
Updated by [id number]: Albaro Chambers on 19   4:52 pm CT
Patient Name: CHANTAL TIPTON                                     
Admission Status: Elective   
Accout number: [id number]                              
Admission Date: 2019   
: 1931                                                        
Admission Diagnosis:ACUTE KIDNEY FAILURE, UNSPECIFIED   
Attending: SHARATH TORRES                                                
Current LOS:  2   
  
Anticipated DC Date:    
Planned Disposition: Skilled Nursing Facility   
Primary Insurance: [employer] CHOICE PPO MCR ADVANT   
PLANNED EXTERNAL PROVIDER:  [facility], MEDICARE REHAB 
BED  
  
Discharge Planning Comments:   
CM RECEIVED ORDER INDICATING PT WANTS NURSING HOME CARE.  CM MET WITH PT AND 
SON IN ROOM TO DISCUSS DISCHARGE PLANNING AND NEEDS. PT REPORTS THAT BEFORE 
GETTING SICK, SHE WAS LIVING AT HOME WITH HER SON, INDEPENDENT IN HER CARE.  
PT REPORTS SHE DID TELL THE DOCTOR THAT SHE WOULD BE BETTER OFF IN A NURSING 
HOME BEFORE FAMILY ARRIVED TODAY.  PT WANTS REHAB, NOT LONG TERM CARE.   PT 
HAS NO MEDICAL EQUIPMENT AND NO OUTSIDE SERVICES ASSISTING IN THE HOME. CM 
DISCUSSED AVAILABILITY OF HOME HEALTH, REHAB SERVICES AND MEDICAL EQUIPMENT. 
PT'S SON INITIALLY DECLINED TO PARTICIPATE IN DISCHARGE PLANNING REPORTING PT 
WAS SENT TO REHAB DOWNSTAIRS TOO SOON WHEN SHE WAS NOT ABLE TO WALK OR EVEN 
FEED HERSELF. SON WANTS PT CONSIDERED FOR INPATIENT REHAB AGAIN STATING THEY 
SENT HER BACK HERE BECAUSE SHE USED ALL OF HER DAYS DOWN THERE.  PT DOES NOT 
THINK SHE CAN PARTICIPATE IN THREE HOURS OF PROGRESSIVE THERAPY PER DAY.  CM 
EXPLAINED THAT IF PT IS NOT ABLE TO PARTICIPATE IN THE THREE HOURS OF 
PROGRESSIVE THERAPY, IS NOT ABLE TO STAND OR FEED HERSELF, SHE IS NOT GOING 
[facility].  CM DISCUSSED 
AVAILABILITY OF SKILLED NURSING REHAB SERVICES AND PROVIDED CHOICE FORM.  
PT'S SON REPORTS HE ALREADY SIGNED ONE OF THESE FOR [place] THE LAST 
VISIT AND SPEAKING WITH THIS CM.  CM EXPLAINED A NEW ONE WAS NEEDED.  PT'S 
SON SIGNED THE FORM AND DOES NOT WANT PT SENT TO REHAB BEFORE SHE IS STABLE.  
CM EXPLAINED THAT ALL DOCTORS WOULD HAVE TO INDICATE PT IS READY TO DISCHARGE 
AND CM IS ONLY WANTING TO BE PROACTIVE ON PT'S BEHALF FOR DISCHARGE PLANNING 
AND SECURE REHAB BED AHEAD OF TIME TO ENSURE THAT PT IS ABLE TO GET INTO [place] WHEN DISCHARGED AS [place] IS POPULAR REHAB AND DOES FILL UP.  
PT'S SON WILL WANTS PT TO BE STABLE FOR DISCHARGE FOR CM TO SEND REFERRALS.  
 
CHOICE SIGNED. CM PROVIDED PT'S SON WITH CM CONTACT INFORMATION.  
  
CM TO SEND REFERRAL TO [facility] [place] [facility] WHEN PROJECTED 
DISCHARGE DATE IS KNOWN, AS PT'S SON DOES NOT WANT REHAB REFERRAL SENT OUT 
PRIOR TO PT'S MEDICAL STABILITY FOR DISCHARGE TO REHAB.  
  
: Albaro Chambers
 
 DCPIA - Discharge Planning Initial Assessment
 
Updated by [id number]: Albaro Chambers on 19   1:21 pm
*  Is the patient Alert and Oriented?
Yes
*  How many steps to enter\exit or inside your home? NONE *  PCP DR. MCCALL *  Pharmacy
[facility]
*  Preadmission Environment
Acute Inpatient Rehab
*  Facility Name
[facility]
*  ADLs
Total Dependent
*  Equipment
None
*  Other Equipment
NO MEDICAL EQUIPMENT PROVIDER PREFERECE
*  List name and contact numbers for known caregivers / representatives who 
currently or will assist patient after discharge:
ELIAS KUNZ [phone]
*  Verbal permission to speak to the caregivers and representatives has been 
obtained from the patient.
Yes
*  Community resources currently utilized
None
*  Please name any agencies selected above.
NONE
*  Additional services required to return to the preadmission environment?
Yes
*  Can the patient safely return to the preadmission environment?
Yes
*  Has this patient been hospitalized within the prior 30 days at any 
hospital?
Yes
 
 
 
 
 
Coverage Notice
 
Reviewer: WILLIAN Chambers
 
Notice Issued Date-Time: 2019  13:00
 
Notice Type: Patient Choice Letter
 
Notice Delivered To: Family Member
Relationship to Patient: Other Relationship
Representative Name: JULIANNE BERMUDEZ
 
Delivery Method: HAND - Hand Delivered
Ariadne Days:
Prior Verbal Notification: 
 
Recipient Understood Notice: Yes
Recipient Signature: Yes
Med Rec Note Co-signed by Attending:
 
Coverage Notice Comment:  [facility]
Reviewer: WILLIAN Chambers
 
Notice Issued Date-Time: 2019  12:20
Notice Type: IM Discharge Notice
 
Notice Delivered To: Family Member
Relationship to Patient: Other Relationship
Representative Name: JULIANNE BERMUDEZ
 
Delivery Method: HAND - Hand Delivered
Ariadne Days:
Prior Verbal Notification: 
 
Recipient Understood Notice: Yes
Recipient Signature: Yes
Med Rec Note Co-signed by Attending:
 
Coverage Notice Comment:  
Reviewer: WILLIAN Chambers
 
Notice Issued Date-Time: 2019  16:20
Notice Type: IM Discharge Notice
 
Notice Delivered To: Family Member
Relationship to Patient: Other Relationship
Representative Name: AMARI BERMUDEZ
 
Delivery Method: HAND - Hand Delivered
Ariadne Days:
Prior Verbal Notification: 
 
Recipient Understood Notice: Yes
Recipient Signature: Yes
Med Rec Note Co-signed by Attending:
 
Coverage Notice Comment:  
Reviewer: WILLIAN Chambers
 
 
Notice Issued Date-Time: 2019  16:20
Notice Type: Patient Choice Letter
 
Notice Delivered To: Family Member
Relationship to Patient: Other Relationship
Representative Name: AMARI BERMUDEZ
 
Delivery Method: HAND - Hand Delivered
Ariadne Days:
Prior Verbal Notification: 
 
Recipient Understood Notice: Yes
Recipient Signature: Yes
Med Rec Note Co-signed by Attending:
 
Coverage Notice Comment:  [facility]
 
Last DP export: 19   4:12 p
Patient Name: CHANTAL TIPTON
Encounter #: [id number]
Page [id number]
 
 
 
 
 
Electronically Signed by CHELE RAMON on 19 at 1733
 
 
 
 
 
 
**All edits/amendments must be made on the electronic document**
 
DICTATION DATE: 19     : KARY  19     
RPT#: [id number]                                DC DATE:        
                                               STATUS: ADM IN  
[facility]
[address] [place] [address]
***END OF REPORT***

## 2019-01-21 NOTE — MORECARE
CASE MANAGEMENT DISCHARGE SUMMARY
 
 
PATIENT: CHANTAL TIPTON                        UNIT: [id number]
ACCOUNT#: [id number]                       ADM DATE: 19
AGE: 88     : [birth year]  SEX: F            ROOM/BED: D.2112    
AUTHOR: CHELE RAMON                             PHYSICIAN:                               
 
REFERRING PHYSICIAN: SHARATH TORRES MD               
DATE OF SERVICE: 19
Discharge Plan
 
 
Patient Name: CHANTAL TIPTON
[facility]
Encounter #: [id number]
Medical Record #: [id number]
: 1931
Planned Disposition: Skilled Nursing Facility
Anticipated Discharge Date: 19
 
Discharge Date: 
Expected LOS: 15
Initial Reviewer: [id number]
Initial Review Date: 2019
Generated: 19   6:12 pm 
DCP- Discharge Planning
 
Updated by [id number]: Marli Vickers on 19  11:53 am CT
RECEIVED NOTICE THAT THE PATIENTS FAMILY WAS WANTING HER TO HAVE THE 
KYPHOPLASTY BEFORE SHE LEAVES THE HOSPITAL. IT WAS MENTIONED THAT THE 
PATIENTS POA HAS NOT BEEN HER AND THERE IS NO POA PAPERWORK IN THE CHART. I 
ASKED THE BEDSIDE NURSE PENG ENCARNACION TO COME INTO THE ROOM WITH ME TO DISCUSS 
THE NEED FOR POA PAPERWORK IN ORDER TO HAVE ANY CONSENTS FOR PROCEDURES 
SIGNED SINCE THE PATIENT WAS NOT ABLE TO SIGN HERSELF, AND BY THE HIERACHY OF 
LAW, THE POA OR NEXT OF KIN WOULD BE WHO NEEDED TO SIGN AND SINCE THEY (THE 
LADY AND FIJUVENTINO) WERE NOT BLOOD RELATED, THEY COULD NOT SIGN FOR HER AS THEY 
HAVE BEEN. AT THIS TIME I WAS QUICKLY TOLD THAT SHE WAS IN HER RIGHT MIND AND 
ABLE TO MAKE ALL HER OWN DECISIONS. THE LADY AT BEDSIDE PROCEEDED TO TELL ME 
ABOUT ALL THE TIMES PEOPLE CAME IN AND QUESTIONED HER ABOUT THE DATE, TIME, 
PRESIDENT, ETC AND SHE WAS ABLE TO ALWAYS ANSWER ALL THEIR QUESTIONS. SHE 
STATED THE PATIENT WAS ABLE TO SIGN FOR HERSELF. UP TO THIS POINT, THE 
PATIENT HAD NOT OPENED HER EYES AND HAD NOT SAID ANYTHING. IT TOOK ME 
TOUCHING THE PATIENT'S ARM TO GET HER TO OPEN HER EYES AND ACKNOWLEDGE MY 
PRESENCE AND ANSWER MY QUESTIONS. THE PATIENT IS WITH IT AND ABLE TO ANSWER 
QUESTIONS. AT THIS POINT I ASKED HER IF SHE WANTED TO HAVE THE KYPHOPLASTY 
DONE. SHE STATED YES. THE LADY AT BEDSIDE BECAME VERY AGITATED STATING THAT 
SHE KNEW ABOUT HIPPA LAWS AND WANTED TO KNOW WHY ANYONE EVEN ALLOWED THINGS 
TO HAPPEN UP UNTIL NOW. NO ONE HAD ASKED FOR ANYONE'S DRIVERS LICENES TO 
VERIFY THEY WERE WHO THEY SAID THEY WERE. I TRIED TO EXPLAINE THAT EVEN WITH 
 
THAT, WE HAD NO WAY TO ACTUALLY VERIFY THAT WITH A DRIVERS LICENSE BECAUSE 
ANYONE CAN SAY THEY ARE SOMEONES FAMILY AND WE DON'T KNOW OTHERWISE UNLESS 
THE PATIENT SAYS SO, BUT SHE CUT ME OFF AS I WAS TALKING. I STOOD AT BEDSIDE 
AND LISTENED TO HER RANT ABOUT ALL THE THINGS THAT SHE HAS WITNESSED HERE AT 
THIS HOSPITAL THAT HAS MADE HER SICK TO HER STOMACH ESPECIALLY ABOUT THE FACT 
THAT SHE CAME IN HERE WITH A HURT BACK AND NOW THEY ARE JUST WATCHING HER DIE.
 I APOLOGIZED TO HER ABOUT ALL OF THE THINGS THAT  WAS UPSET ABOUT, AND 
THEN DIRECTED MY CONVERSATION TO THE PATIENT, EXPLAINING THAT IF SHE WANTED 
THEY KYPHOPLASTY AND HER URINE WAS CLEAR THAT I WOULD EXPLAIN THIS TO THE 
NURSE PRACTITIONER. THAT ULTIMATELY I AM HERE TO TAKE CARE OF HER AND MAKE 
SURE WE ARE DOING WHAT SHE WANTS. I ALSO EXPLAINED TO HER THAT SHE NEEDS TO 
SIGN ALL OF HER OWN PAPERWORK, EVEN IF IT IS JUST WITH AN "X". THAT WE SIGN 
AS WITNESSES TO STATE WE KNOW SHE UNDERSTANDS AND SHE IS SIGNING. EXPLAINED 
THAT AS LONG AS SHE IS ABLE TO MAKE ALL HER OWN DECISIONS, WE DID NOT NEED 
HER POA PAPERWORK. SHE IS IN AGREEMENT. THE GREAT NEPHEWS WIFE WHO IS AT 
BEDSIDE CONTINUED WITH HER RANT, I AGAIN APOLOGIZED AND THE BEDSIDE NURSE AND 
I LEFT THE ROOM. I EXPLAINED TO SCOTTIE THAT THE PATIENT WAS CURRENTLY IN HER 
RIGHT MIND AND SHE IS WANTING THEY KYPHOPLASTY, AND SHE RECONSULTED IR TO SEE 
THE PATIENT.
DCP- Discharge Planning
 
Updated by [id number]: Melany Olivares on 19   4:25 pm CT
CM RECEIVED AN ORDER REGARDING PLANS TO DISCHARGE TO HOME W/ HOSPITAL BED , 
TUBE FEEDING AND HOME HEALTH. PATIENT'S NUTRITION NOTE IS 19. WILL NEED 
NUTRITION NOTE REGARDING PATIENT NEEDS FOR DISCHARGE. CM UNDERSTANDS THE 
PATIENT DOES EAT AND TAKE HER MEDICATIONS BY MOUTH. SHE IS PRESENTLY ON 
FEEDINGS VIA  PUMP. SHE HAS BEEN ADVANCED TO 60 CC/HR TODAY WHICH IS HER GOAL 
RATE.  
SPOKE WITH LIYAH KLINE AND SHE REPORTS NO RESIDUAL AT THIS TIME.  
WILL NEED TO HAVE [facility] PROVIDE BED AND SPECIALTY MATTRESS  FOR HOME 
AND   
DETERMINE COVERAGE FOR TUBE FEEDING AND EQUIPMENT.  
CM TO FOLLOW UP IN AM.
DCP- Discharge Planning
 
Updated by [id number]: Albaro Chambers on 19   7:14 am CT
Patient Name:  CHANTAL TIPTON   
Encounter No:  [id number]   
:  1931   
Primary Insurance:  [employer] CHOICE PPO MCR ADVANT  
Anticipated DC Date: 2019   
Planned Disposition:  Skilled Nursing Facility  
External Planned Provider: [facility], MEDICARE REHAB BED 
  
  
DCP follow-up note: CM FAXED UPDATE TO [facility], 
[phone] FOR REHAB REQUEST.  
  
CM WAITING ADMISSION DETERMINATION FROM [facility] FOR 
REHAB PLACEMENT.  
  
ALBARO CHAMBERS, CASE MANAGEMENT
 
DCP- Discharge Planning
 
Updated by [id number]: Albaro Chambers on 19   3:46 pm CT
Patient Name:  CHANTAL TIPTON   
Encounter No:  [id number]   
:  1931   
Primary Insurance:  [employer] CHOICE PPO MCR ADVANT  
Anticipated DC Date: 2019   
Planned Disposition:  Skilled Nursing Facility  
External Planned Provider:  [facility], MEDICARE REHAB 
BED  
  
  
DCP follow-up note: CM MET WITH PT'S GREAT NEAMOS BY MARRIAGE, AMARI BERMUDEZ, 
[phone].  AMARI STATES THAT JULIANNE BERMUDEZ IS PT'S FIANCE.  PT HAS POWER 
OF  WITH GREAT NEPHEW, JOEL SPOUSE, MAGED BERMUDEZ, WHO WORKS IN 
[employer].  AMARI WILL GET MAGED TO HOSPITAL ALONG WITH POWER OF  
PAPERWORK AS SOON AS POSSIBLE.  THEY ARE IN AGREEMENT WITH REHAB AT [facility], BUT [facility] MAY NOT ACCEPT AS PT HAS CONTINUED DECLINE.  [facility] TO COME TOMORROW TO EVALUATE PT FOR REHAB.  IF [facility] DOES 
NOT ACCEPT FOR REHAB, THEY ARE THINKING TO TAKE PT HOME TO [facility] HERE 
IN [place] AND AMARI ALONG WITH FAMILY WILL CARE FOR PT WITH HOME 
HOSPICE.  AMARI REPORTS THEY WILL MAKE THAT DECISION TOMORROW.  CM EXPLAINED 
THAT THE POWER OF  WILL HAVE TO BE IN AGREEMENT AND SIGN ANY NEEDED 
AUTHORIZATIONS FOR ENROLLMENT IN REHAB OR HOSPICE CARE.  AMARI REPORTS 
UNDERSTANDING, PROVIDED CELL PHONE NUMBER FOR MAGED BERMUDEZ, [phone].  
  
CM WAITING ADMISSION DETERMINATION FROM [facility] FOR 
REHAB PLACEMENT.  
  
ALBARO CHAMBERS CASE MANAGEMENT
 
DCP- Discharge Planning
 
Updated by [id number]: Albaro Chambers on 19   3:34 pm CT
Patient Name:  CHANTAL TIPTON   
Encounter No:  [id number]   
:  1931   
Primary Insurance:  [employer] CHOICE PPO MCR ADVANT  
Anticipated DC Date: 2019   
Planned Disposition:  Skilled Nursing Facility  
External Planned Provider: [facility], MEDICARE REHAB BED 
  
  
DCP follow-up note: CM MET WITH PT AND SON/POA, JULIANNE BERMUDEZ, AT FAMILY 
REQUEST.  MR. BERMUDEZ REPORTS IT IS NOW TIME TO SEND REFERRAL TO [facility] 
FOR REHAB PLACEMENT.  IMPORTANT MESSAGE FROM MEDICARE PROVIDED AND EXPLAINED. 
  
CM FAXED REFERRAL TO [facility], [phone].   
  
CM WAITING ADMISSION DETERMINATION FROM [facility] FOR 
REHAB PLACEMENT.  
 
  
NEERU VILLATORO
DCP- Discharge Planning
 
Updated by [id number]: Albaro Chambers on 19   4:52 pm CT
Patient Name: CHANTAL TIPTON                                     
Admission Status: Elective   
Accout number: [id number]                              
Admission Date: 2019   
: 1931                                                        
Admission Diagnosis:ACUTE KIDNEY FAILURE, UNSPECIFIED   
Attending: SHARATH TORRES                                                
Current LOS:  2   
  
Anticipated DC Date:    
Planned Disposition: Skilled Nursing Facility   
Primary Insurance: [employer] CHOICE PPO MCR ADVANT   
PLANNED EXTERNAL PROVIDER:  [facility], MEDICARE REHAB 
BED  
  
Discharge Planning Comments:   
CM RECEIVED ORDER INDICATING PT WANTS NURSING HOME CARE.  CM MET WITH PT AND 
SON IN ROOM TO DISCUSS DISCHARGE PLANNING AND NEEDS. PT REPORTS THAT BEFORE 
GETTING SICK, SHE WAS LIVING AT HOME WITH HER SON, INDEPENDENT IN HER CARE.  
PT REPORTS SHE DID TELL THE DOCTOR THAT SHE WOULD BE BETTER OFF IN A NURSING 
HOME BEFORE FAMILY ARRIVED TODAY.  PT WANTS REHAB, NOT LONG TERM CARE.   PT 
HAS NO MEDICAL EQUIPMENT AND NO OUTSIDE SERVICES ASSISTING IN THE HOME. CM 
DISCUSSED AVAILABILITY OF HOME HEALTH, REHAB SERVICES AND MEDICAL EQUIPMENT. 
PT'S SON INITIALLY DECLINED TO PARTICIPATE IN DISCHARGE PLANNING REPORTING PT 
WAS SENT TO REHAB DOWNSTAIRS TOO SOON WHEN SHE WAS NOT ABLE TO WALK OR EVEN 
FEED HERSELF. SON WANTS PT CONSIDERED FOR INPATIENT REHAB AGAIN STATING THEY 
SENT HER BACK HERE BECAUSE SHE USED ALL OF HER DAYS DOWN THERE.  PT DOES NOT 
THINK SHE CAN PARTICIPATE IN THREE HOURS OF PROGRESSIVE THERAPY PER DAY.  CM 
EXPLAINED THAT IF PT IS NOT ABLE TO PARTICIPATE IN THE THREE HOURS OF 
PROGRESSIVE THERAPY, IS NOT ABLE TO STAND OR FEED HERSELF, SHE IS NOT GOING 
TO BE APPROPRIATE FOR READMISSION TO INPATENT REHAB.  CM DISCUSSED 
AVAILABILITY OF SKILLED NURSING REHAB SERVICES AND PROVIDED CHOICE FORM.  
PT'S SON REPORTS HE ALREADY SIGNED ONE OF THESE FOR [facility] THE LAST 
VISIT AND SPEAKING WITH THIS CM.  CM EXPLAINED A NEW ONE WAS NEEDED.  PT'S 
SON SIGNED THE FORM AND DOES NOT WANT PT SENT TO REHAB BEFORE SHE IS STABLE.  
CM EXPLAINED THAT ALL DOCTORS WOULD HAVE TO INDICATE PT IS READY TO DISCHARGE 
AND CM IS ONLY WANTING TO BE PROACTIVE ON PT'S BEHALF FOR DISCHARGE PLANNING 
AND SECURE REHAB BED AHEAD OF TIME TO ENSURE THAT PT IS ABLE TO GET INTO [facility] WHEN DISCHARGED AS [facility] IS POPULAR REHAB AND DOES FILL UP.  
PT'S SON WILL WANTS PT TO BE STABLE FOR DISCHARGE FOR CM TO SEND REFERRALS.  
CHOICE SIGNED. CM PROVIDED PT'S SON WITH CM CONTACT INFORMATION.  
  
CM TO SEND REFERRAL TO [facility] WHEN PROJECTED 
DISCHARGE DATE IS KNOWN, AS PT'S SON DOES NOT WANT REHAB REFERRAL SENT OUT 
PRIOR TO PT'S MEDICAL STABILITY FOR DISCHARGE TO REHAB.  
  
: Albaro Chambers
 
 
 DCPIA - Discharge Planning Initial Assessment
 
Updated by [id number]: Albaro Chambers on 19   1:21 pm
*  Is the patient Alert and Oriented?
Yes
*  How many steps to enter\exit or inside your home? NONE *  PCP DR. MCCALL *  Pharmacy
[facility]
*  Preadmission Environment
Acute Inpatient Rehab
*  Facility Name
[facility] IN[facility]
*  ADLs
Total Dependent
*  Equipment
None
*  Other Equipment
NO MEDICAL EQUIPMENT PROVIDER PREFERECE
*  List name and contact numbers for known caregivers / representatives who 
currently or will assist patient after discharge:
ELIAS KUNZ [phone]
*  Verbal permission to speak to the caregivers and representatives has been 
obtained from the patient.
Yes
*  Community resources currently utilized
None
*  Please name any agencies selected above.
NONE
*  Additional services required to return to the preadmission environment?
Yes
*  Can the patient safely return to the preadmission environment?
Yes
*  Has this patient been hospitalized within the prior 30 days at any 
hospital?
Yes
 
 
 
 
 
Coverage Notice
 
Reviewer: [id number] - Albaro Chambers
 
Notice Issued Date-Time: 2019  13:00
Notice Type: Patient Choice Letter
 
Notice Delivered To: Family Member
Relationship to Patient: Son
Representative Name: JULIANNE BERMUDEZ
 
Delivery Method: HAND - Hand Delivered
 
Ariadne Days:
Prior Verbal Notification: 
 
Recipient Understood Notice: Yes
Recipient Signature: Yes
Med Rec Note Co-signed by Attending:
 
Coverage Notice Comment:  [facility]
Reviewer: [id number] Gopi Chambers
 
Notice Issued Date-Time: 2019  12:20
Notice Type: IM Discharge Notice
 
Notice Delivered To: Family Member
Relationship to Patient: Son
Representative Name: JULIANNE BERMUDEZ
 
Delivery Method: HAND - Hand Delivered
Ariadne Days:
Prior Verbal Notification: 
 
Recipient Understood Notice: Yes
Recipient Signature: Yes
Med Rec Note Co-signed by Attending:
 
Coverage Notice Comment:  
 
Last DP export: 19  12:22 p
Patient Name: CHANTAL TIPTON
Encounter #: [id number]
Page [id number]
 
 
 
 
 
Electronically Signed by CEHLE RAMON on 19 at 1712
 
 
 
 
 
 
**All edits/amendments must be made on the electronic document**
 
DICTATION DATE: 19     : KARY  19     
RPT#: [id number]                                DC DATE:        
                                               STATUS: ADM IN  
[facility]
[address] [place] [address]
***END OF REPORT***

## 2019-01-21 NOTE — MORECARE
CASE MANAGEMENT DISCHARGE SUMMARY
 
 
PATIENT: CHANTAL TIPTON                        UNIT: [id number]
ACCOUNT#: [id number]                       ADM DATE: 19
AGE: 88     : [birth year]  SEX: F            ROOM/BED: D.2112    
AUTHOR: CHELE RAMON                             PHYSICIAN:                               
 
REFERRING PHYSICIAN: SHARATH TORRES MD               
DATE OF SERVICE: 19
Discharge Plan
 
 
Patient Name: CHANTAL TIPTON
Facility: [facility]
Encounter #: [id number]
Medical Record #: [id number]
: 1931
Planned Disposition: Skilled Nursing Facility
Anticipated Discharge Date: 19
 
Discharge Date: 
Expected LOS: 15
Initial Reviewer: [id number]
Initial Review Date: 2019
Generated: 19   2:12 pm 
Comments
 
DCP- Discharge Planning
 
Updated by [id number]: Marli Vickers on 19  11:53 am CT
RECEIVED NOTICE THAT THE PATIENTS FAMILY WAS WANTING HER TO HAVE THE 
KYPHOPLASTY BEFORE SHE LEAVES THE HOSPITAL. IT WAS MENTIONED THAT THE 
PATIENTS POA HAS NOT BEEN HER AND THERE IS NO POA PAPERWORK IN THE CHART. I 
ASKED THE BEDSIDE NURSE PENG ENCARNACION TO COME INTO THE ROOM WITH ME TO DISCUSS 
THE NEED FOR POA PAPERWORK IN ORDER TO HAVE ANY CONSENTS FOR PROCEDURES 
SIGNED SINCE THE PATIENT WAS NOT ABLE TO SIGN HERSELF, AND BY THE HIERACHY OF 
LAW, THE POA OR NEXT OF KIN WOULD BE WHO NEEDED TO SIGN AND SINCE THEY (THE 
LADY AND FIJUVENTINO) WERE NOT BLOOD RELATED, THEY COULD NOT SIGN FOR HER AS THEY 
HAVE BEEN. AT THIS TIME I WAS QUICKLY TOLD THAT SHE WAS IN HER RIGHT MIND AND 
ABLE TO MAKE ALL HER OWN DECISIONS. THE LADY AT BEDSIDE PROCEEDED TO TELL ME 
ABOUT ALL THE TIMES PEOPLE CAME IN AND QUESTIONED HER ABOUT THE DATE, TIME, 
PRESIDENT, ETC AND SHE WAS ABLE TO ALWAYS ANSWER ALL THEIR QUESTIONS. SHE 
STATED THE PATIENT WAS ABLE TO SIGN FOR HERSELF. UP TO THIS POINT, THE 
PATIENT HAD NOT OPENED HER EYES AND HAD NOT SAID ANYTHING. IT TOOK ME 
TOUCHING THE PATIENT'S ARM TO GET HER TO OPEN HER EYES AND ACKNOWLEDGE MY 
PRESENCE AND ANSWER MY QUESTIONS. THE PATIENT IS WITH IT AND ABLE TO ANSWER 
QUESTIONS. AT THIS POINT I ASKED HER IF SHE WANTED TO HAVE THE KYPHOPLASTY 
DONE. SHE STATED YES. THE LADY AT BEDSIDE BECAME VERY AGITATED STATING THAT 
SHE KNEW ABOUT HIPPA LAWS AND WANTED TO KNOW WHY ANYONE EVEN ALLOWED THINGS 
 
TO HAPPEN UP UNTIL NOW. NO ONE HAD ASKED FOR ANYONE'S DRIVERS LICENES TO 
VERIFY THEY WERE WHO THEY SAID THEY WERE. I TRIED TO EXPLAINE THAT EVEN WITH 
THAT, WE HAD NO WAY TO ACTUALLY VERIFY THAT WITH A DRIVERS LICENSE BECAUSE 
ANYONE CAN SAY THEY ARE SOMEONES FAMILY AND WE DON'T KNOW OTHERWISE UNLESS 
THE PATIENT SAYS SO, BUT SHE CUT ME OFF AS I WAS TALKING. I STOOD AT BEDSIDE 
AND LISTENED TO HER RANT ABOUT ALL THE THINGS THAT SHE HAS WITNESSED HERE AT 
THIS HOSPITAL THAT HAS MADE HER SICK TO HER STOMACH ESPECIALLY ABOUT THE FACT 
THAT SHE CAME IN HERE WITH A HURT BACK AND NOW THEY ARE JUST WATCHING HER DIE.
 I APOLOGIZED TO HER ABOUT ALL OF THE THINGS THAT  WAS UPSET ABOUT, AND 
THEN DIRECTED MY CONVERSATION TO THE PATIENT, EXPLAINING THAT IF SHE WANTED 
THEY KYPHOPLASTY AND HER URINE WAS CLEAR THAT I WOULD EXPLAIN THIS TO THE 
NURSE PRACTITIONER. THAT ULTIMATELY I AM HERE TO TAKE CARE OF HER AND MAKE 
SURE WE ARE DOING WHAT SHE WANTS. I ALSO EXPLAINED TO HER THAT SHE NEEDS TO 
SIGN ALL OF HER OWN PAPERWORK, EVEN IF IT IS JUST WITH AN "X". THAT WE SIGN 
AS WITNESSES TO STATE WE KNOW SHE UNDERSTANDS AND SHE IS SIGNING. EXPLAINED 
THAT AS LONG AS SHE IS ABLE TO MAKE ALL HER OWN DECISIONS, WE DID NOT NEED 
HER POA PAPERWORK. SHE IS IN AGREEMENT. THE GREAT NEPHEWS WIFE WHO IS AT 
BEDSIDE CONTINUED WITH HER RANT, I AGAIN APOLOGIZED AND THE BEDSIDE NURSE AND 
I LEFT THE ROOM. I EXPLAINED TO SCOTTIE THAT THE PATIENT WAS CURRENTLY IN HER 
RIGHT MIND AND SHE IS WANTING THEY KYPHOPLASTY, AND SHE RECONSULTED IR TO SEE 
THE PATIENT.
DCP- Discharge Planning
 
Updated by [id number]: Melany Olivares on 19   4:25 pm CT
CM RECEIVED AN ORDER REGARDING PLANS TO DISCHARGE TO HOME W/ HOSPITAL BED , 
TUBE FEEDING AND HOME HEALTH. PATIENT'S NUTRITION NOTE IS 19. WILL NEED 
NUTRITION NOTE REGARDING PATIENT NEEDS FOR DISCHARGE. CM UNDERSTANDS THE 
PATIENT DOES EAT AND TAKE HER MEDICATIONS BY MOUTH. SHE IS PRESENTLY ON 
FEEDINGS VIA  PUMP. SHE HAS BEEN ADVANCED TO 60 CC/HR TODAY WHICH IS HER GOAL 
RATE.  
SPOKE WITH LIYAH KLINE AND SHE REPORTS NO RESIDUAL AT THIS TIME.  
WILL NEED TO HAVE [facility] PROVIDE BED AND SPECIALTY MATTRESS  FOR HOME 
AND   
DETERMINE COVERAGE FOR TUBE FEEDING AND EQUIPMENT.  
CM TO FOLLOW UP IN AM.
DCP- Discharge Planning
 
Updated by [id number]: Albaro Chambers on 19   7:14 am CT
Patient Name:  CHANTAL TIPTON   
Encounter No:  [id number]   
:  1931   
Primary Insurance:  [employer] CHOICE PPO MCR ADVANT  
Anticipated DC Date: 2019   
Planned Disposition:  Skilled Nursing Facility  
External Planned Provider: [facility], MEDICARE REHAB BED 
  
  
DCP follow-up note: CM FAXED UPDATE TO [facility], 
[phone] FOR REHAB REQUEST.  
  
CM WAITING ADMISSION DETERMINATION FROM [facility] FOR 
REHAB PLACEMENT.  
 
  
ALBARO CHAMBERS, CASE MANAGEMENT
DCP- Discharge Planning
 
Updated by [id number]: Albaro Chambers on 19   3:46 pm CT
Patient Name:  CHANTAL TIPTON   
Encounter No:  [id number]   
:  1931   
Primary Insurance:  [employer] CHOICE PPO MCR ADVANT  
Anticipated DC Date: 2019   
Planned Disposition:  Skilled Nursing Facility  
External Planned Provider:  [facility], MEDICARE REHAB 
BED  
  
  
DCP follow-up note: CM MET WITH PT'S GREAT NEAMOS BY MARRIAGE, AMARI BERMUDEZ, 
[phone].  AMARI STATES THAT JULIANNE BERMUDEZ IS PT'S FIANCE.  PT HAS POWER 
OF  WITH GREAT NEPHEW, JOEL SPOUSE, MAGED BERMUDEZ, WHO WORKS IN 
[employer].  AMARI WILL GET MAGED TO HOSPITAL ALONG WITH POWER OF  
PAPERWORK AS SOON AS POSSIBLE.  THEY ARE IN AGREEMENT WITH REHAB AT [facility], BUT [facility] MAY NOT ACCEPT AS PT HAS CONTINUED DECLINE.  [facility] TO COME TOMORROW TO EVALUATE PT FOR REHAB.  IF [facility] DOES 
NOT ACCEPT FOR REHAB, THEY ARE THINKING TO TAKE PT HOME TO [facility] HERE 
IN [place] AND AMARI ALONG WITH FAMILY WILL CARE FOR PT WITH HOME 
HOSPICE.  AMARI REPORTS THEY WILL MAKE THAT DECISION TOMORROW.  CM EXPLAINED 
THAT THE POWER OF  WILL HAVE TO BE IN AGREEMENT AND SIGN ANY NEEDED 
AUTHORIZATIONS FOR ENROLLMENT IN REHAB OR HOSPICE CARE.  AMARI REPORTS 
UNDERSTANDING, PROVIDED CELL PHONE NUMBER FOR MAGED BERMUDEZ, [phone].  
  
CM WAITING ADMISSION DETERMINATION FROM [facility] FOR 
REHAB PLACEMENT.  
  
ALBARO CHAMBERS CASE MANAGEMENT
 
DCP- Discharge Planning
 
Updated by [id number]: Albaro Chambers on 19   3:34 pm CT
Patient Name:  CHANTAL TIPTON   
Encounter No:  [id number]   
:  1931   
Primary Insurance:  [employer] CHOICE PPO MCR ADVANT  
Anticipated DC Date: 2019   
Planned Disposition:  Skilled Nursing Facility  
External Planned Provider: [facility], MEDICARE REHAB BED 
  
  
DCP follow-up note: CM MET WITH PT AND SON/POA, JULIANNE BERMUDEZ, AT FAMILY 
REQUEST.  MR. BERMUDEZ REPORTS IT IS NOW TIME TO SEND REFERRAL TO [facility] 
FOR REHAB PLACEMENT.  IMPORTANT MESSAGE FROM MEDICARE PROVIDED AND EXPLAINED. 
  
CM FAXED REFERRAL TO [facility], [phone].   
  
 
CM WAITING ADMISSION DETERMINATION FROM [facility] FOR 
REHAB PLACEMENT.  
  
NEERU VILLATORO
DCP- Discharge Planning
 
Updated by [id number]: Albaro Chambers on 19   4:52 pm CT
Patient Name: CHANTAL TIPTON                                     
Admission Status: Elective   
Accout number: [id number]                              
Admission Date: 2019   
: 1931                                                        
Admission Diagnosis:ACUTE KIDNEY FAILURE, UNSPECIFIED   
Attending: SHARATH TORRES                                                
Current LOS:  2   
  
Anticipated DC Date:    
Planned Disposition: Skilled Nursing Facility   
Primary Insurance: [employer] CHOICE PPO MCR ADVANT   
PLANNED EXTERNAL PROVIDER:  [facility], MEDICARE REHAB 
BED  
  
Discharge Planning Comments:   
CM RECEIVED ORDER INDICATING PT WANTS NURSING HOME CARE.  CM MET WITH PT AND 
SON IN ROOM TO DISCUSS DISCHARGE PLANNING AND NEEDS. PT REPORTS THAT BEFORE 
GETTING SICK, SHE WAS LIVING AT HOME WITH HER SON, INDEPENDENT IN HER CARE.  
PT REPORTS SHE DID TELL THE DOCTOR THAT SHE WOULD BE BETTER OFF IN A NURSING 
HOME BEFORE FAMILY ARRIVED TODAY.  PT WANTS REHAB, NOT LONG TERM CARE.   PT 
HAS NO MEDICAL EQUIPMENT AND NO OUTSIDE SERVICES ASSISTING IN THE HOME. CM 
DISCUSSED AVAILABILITY OF HOME HEALTH, REHAB SERVICES AND MEDICAL EQUIPMENT. 
PT'S SON INITIALLY DECLINED TO PARTICIPATE IN DISCHARGE PLANNING REPORTING PT 
WAS SENT TO REHAB DOWNSTAIRS TOO SOON WHEN SHE WAS NOT ABLE TO WALK OR EVEN 
FEED HERSELF. SON WANTS PT CONSIDERED FOR INPATIENT REHAB AGAIN STATING THEY 
SENT HER BACK HERE BECAUSE SHE USED ALL OF HER DAYS DOWN THERE.  PT DOES NOT 
THINK SHE CAN PARTICIPATE IN THREE HOURS OF PROGRESSIVE THERAPY PER DAY.  CM 
EXPLAINED THAT IF PT IS NOT ABLE TO PARTICIPATE IN THE THREE HOURS OF 
PROGRESSIVE THERAPY, IS NOT ABLE TO STAND OR FEED HERSELF, SHE IS NOT GOING 
TO BE APPROPRIATE FOR READMISSION TO INPATENT REHAB.  CM DISCUSSED 
AVAILABILITY OF SKILLED NURSING REHAB SERVICES AND PROVIDED CHOICE FORM.  
PT'S SON REPORTS HE ALREADY SIGNED ONE OF THESE FOR [facility] THE LAST 
VISIT AND SPEAKING WITH THIS CM.  CM EXPLAINED A NEW ONE WAS NEEDED.  PT'S 
SON SIGNED THE FORM AND DOES NOT WANT PT SENT TO REHAB BEFORE SHE IS STABLE.  
CM EXPLAINED THAT ALL DOCTORS WOULD HAVE TO INDICATE PT IS READY TO DISCHARGE 
AND CM IS ONLY WANTING TO BE PROACTIVE ON PT'S BEHALF FOR DISCHARGE PLANNING 
AND SECURE REHAB BED AHEAD OF TIME TO ENSURE THAT PT IS ABLE TO GET INTO [facility] WHEN DISCHARGED AS [facility] IS POPULAR REHAB AND DOES FILL UP.  
PT'S SON WILL WANTS PT TO BE STABLE FOR DISCHARGE FOR CM TO SEND REFERRALS.  
CHOICE SIGNED. CM PROVIDED PT'S SON WITH CM CONTACT INFORMATION.  
  
CM TO SEND REFERRAL TO [facility] WHEN PROJECTED 
DISCHARGE DATE IS KNOWN, AS PT'S SON DOES NOT WANT REHAB REFERRAL SENT OUT 
PRIOR TO PT'S MEDICAL STABILITY FOR DISCHARGE TO REHAB.  
 
  
: Albaro Chambers
 
 DCPIA - Discharge Planning Initial Assessment
 
Updated by [id number]: Albaro Chambers on 19   5:43 pm
*  Is the patient Alert and Oriented?
Yes
*  How many steps to enter\exit or inside your home? NONE *  PCP DR. MCCALL *  Pharmacy
[facility]
*  Preadmission Environment
Acute Inpatient Rehab
*  Facility Name
[facility] INPATIENT REHAB
*  ADLs
Total Dependent
*  Equipment
None
*  Other Equipment
NO MEDICAL EQUIPMENT PROVIDER PREFERECE
*  List name and contact numbers for known caregivers / representatives who 
currently or will assist patient after discharge:
JULIANNE BERMUDEZ, SON/POA, [phone]
*  Verbal permission to speak to the caregivers and representatives has been 
obtained from the patient.
Yes
*  Community resources currently utilized
None
*  Please name any agencies selected above.
NONE
*  Additional services required to return to the preadmission environment?
Yes
*  Can the patient safely return to the preadmission environment?
Yes
*  Has this patient been hospitalized within the prior 30 days at any 
hospital?
Yes
 
 
 
 
 
Coverage Notice
 
Reviewer: [id number] - Albaro Chambers
 
Notice Issued Date-Time: 2019  13:00
Notice Type: Patient Choice Letter
 
Notice Delivered To: Family Member
Relationship to Patient: Son
Representative Name: JULIANNE BERMUDEZ
 
 
Delivery Method: HAND - Hand Delivered
Ariadne Days:
Prior Verbal Notification: 
 
Recipient Understood Notice: Yes
Recipient Signature: Yes
Med Rec Note Co-signed by Attending:
 
Coverage Notice Comment:  [facility]
Reviewer: [id number] Gopi Chambers
 
Notice Issued Date-Time: 2019  12:20
Notice Type: IM Discharge Notice
 
Notice Delivered To: Family Member
Relationship to Patient: Son
Representative Name: JULIANNE BERMUDEZ
 
Delivery Method: HAND - Hand Delivered
Ariadne Days:
Prior Verbal Notification: 
 
Recipient Understood Notice: Yes
Recipient Signature: Yes
Med Rec Note Co-signed by Attending:
 
Coverage Notice Comment:  
 
Last DP export: 19  11:53 a
Patient Name: CHANTAL TIPTON
Encounter #: [id number]
Page [id number]
 
 
 
 
 
Electronically Signed by CHELE RAMON on 19 at 1312
 
 
 
 
 
 
**All edits/amendments must be made on the electronic document**
 
DICTATION DATE: 19 1311     : KARY  19 1311     
RPT#: [id number]                                DC DATE:        
                                               STATUS: ADM IN  
[facility]
[address] [place] [address]
***END OF REPORT***

## 2019-01-21 NOTE — NUR
CALLED SCOTTIE EMANUEL AND INFOMRED HER THAT PT IS C/O SOB, AND ACCORDING TO PT'S
FAMILY PT SEEMS MORE SWOLLEN TODAY. NEW ORDER FOR CHEST XRAY FOR SOB.

## 2019-01-21 NOTE — NUR
AM MEDS GIVEN TO PT WITH APPLE SAUCE, PT HAD NO TROUBLE SWALLOWING. ALSO GAVE
HER TYLENOL FOR PAIN LEVEL OF 9/10 TO BACK. PT DENIES ANY OTHER NEEDS AT THIS
TIME. RESP EVEN AND NONLABORED ON RA. RT CVL INFUSING NS AT 10CC/HR, AND
LEAKING A LITTLE AROUND SITE. CALL LIGHT IN REACH, FAMILY AT BEDSIDE,NAD
NOTED, WILL CONTINUE PLAN OF CARE.

## 2019-01-21 NOTE — NUR
RESUMING PATIENT CARE. PATIENT IS ALERT AND ORIENTED. RESTING COMFORTABLY IN
BED. RESPIRATIONS ARE EVEN AND UNLABORED. NO S/S OF DISTRESS. NO C/O PAIN.
PATIENT VERBALIZED NO NEEDS AT THIS TIME. CALL LIGHT WITHIN REACH. WILL CPOC.

## 2019-01-21 NOTE — MORECARE
CASE MANAGEMENT DISCHARGE SUMMARY
 
 
PATIENT: CHANTAL TIPTON                        UNIT: [id number]
ACCOUNT#: [id number]                       ADM DATE: 19
AGE: 88     : [birth year]  SEX: F            ROOM/BED: D.2112    
AUTHOR: CHELE RAMON                             PHYSICIAN:                               
 
REFERRING PHYSICIAN: SHARATH TORRES MD               
DATE OF SERVICE: 19
Discharge Plan
 
 
Patient Name: CHANTAL TIPTON
Facility: [facility]
Encounter #: [id number]
Medical Record #: [id number]
: 1931
Planned Disposition: Skilled Nursing Facility
Anticipated Discharge Date: 19
 
Discharge Date: 
Expected LOS: 15
Initial Reviewer: [id number]
Initial Review Date: 2019
Generated: 19   2:22 pm 
Comments
 
DCP- Discharge Planning
 
Updated by [id number]: Marli Vickers on 19  11:53 am CT
RECEIVED NOTICE THAT THE PATIENTS FAMILY WAS WANTING HER TO HAVE THE 
KYPHOPLASTY BEFORE SHE LEAVES THE HOSPITAL. IT WAS MENTIONED THAT THE 
PATIENTS POA HAS NOT BEEN HER AND THERE IS NO POA PAPERWORK IN THE CHART. I 
ASKED THE BEDSIDE NURSE PENG ENCARNACION TO COME INTO THE ROOM WITH ME TO DISCUSS 
THE NEED FOR POA PAPERWORK IN ORDER TO HAVE ANY CONSENTS FOR PROCEDURES 
SIGNED SINCE THE PATIENT WAS NOT ABLE TO SIGN HERSELF, AND BY THE HIERACHY OF 
LAW, THE POA OR NEXT OF KIN WOULD BE WHO NEEDED TO SIGN AND SINCE THEY (THE 
LADY AND FIJUVENTINO) WERE NOT BLOOD RELATED, THEY COULD NOT SIGN FOR HER AS THEY 
HAVE BEEN. AT THIS TIME I WAS QUICKLY TOLD THAT SHE WAS IN HER RIGHT MIND AND 
ABLE TO MAKE ALL HER OWN DECISIONS. THE LADY AT BEDSIDE PROCEEDED TO TELL ME 
ABOUT ALL THE TIMES PEOPLE CAME IN AND QUESTIONED HER ABOUT THE DATE, TIME, 
PRESIDENT, ETC AND SHE WAS ABLE TO ALWAYS ANSWER ALL THEIR QUESTIONS. SHE 
STATED THE PATIENT WAS ABLE TO SIGN FOR HERSELF. UP TO THIS POINT, THE 
PATIENT HAD NOT OPENED HER EYES AND HAD NOT SAID ANYTHING. IT TOOK ME 
TOUCHING THE PATIENT'S ARM TO GET HER TO OPEN HER EYES AND ACKNOWLEDGE MY 
PRESENCE AND ANSWER MY QUESTIONS. THE PATIENT IS WITH IT AND ABLE TO ANSWER 
QUESTIONS. AT THIS POINT I ASKED HER IF SHE WANTED TO HAVE THE KYPHOPLASTY 
DONE. SHE STATED YES. THE LADY AT BEDSIDE BECAME VERY AGITATED STATING THAT 
SHE KNEW ABOUT HIPPA LAWS AND WANTED TO KNOW WHY ANYONE EVEN ALLOWED THINGS 
 
TO HAPPEN UP UNTIL NOW. NO ONE HAD ASKED FOR ANYONE'S DRIVERS LICENES TO 
VERIFY THEY WERE WHO THEY SAID THEY WERE. I TRIED TO EXPLAINE THAT EVEN WITH 
THAT, WE HAD NO WAY TO ACTUALLY VERIFY THAT WITH A DRIVERS LICENSE BECAUSE 
ANYONE CAN SAY THEY ARE SOMEONES FAMILY AND WE DON'T KNOW OTHERWISE UNLESS 
THE PATIENT SAYS SO, BUT SHE CUT ME OFF AS I WAS TALKING. I STOOD AT BEDSIDE 
AND LISTENED TO HER RANT ABOUT ALL THE THINGS THAT SHE HAS WITNESSED HERE AT 
THIS HOSPITAL THAT HAS MADE HER SICK TO HER STOMACH ESPECIALLY ABOUT THE FACT 
THAT SHE CAME IN HERE WITH A HURT BACK AND NOW THEY ARE JUST WATCHING HER DIE.
 I APOLOGIZED TO HER ABOUT ALL OF THE THINGS THAT  WAS UPSET ABOUT, AND 
THEN DIRECTED MY CONVERSATION TO THE PATIENT, EXPLAINING THAT IF SHE WANTED 
THEY KYPHOPLASTY AND HER URINE WAS CLEAR THAT I WOULD EXPLAIN THIS TO THE 
NURSE PRACTITIONER. THAT ULTIMATELY I AM HERE TO TAKE CARE OF HER AND MAKE 
SURE WE ARE DOING WHAT SHE WANTS. I ALSO EXPLAINED TO HER THAT SHE NEEDS TO 
SIGN ALL OF HER OWN PAPERWORK, EVEN IF IT IS JUST WITH AN "X". THAT WE SIGN 
AS WITNESSES TO STATE WE KNOW SHE UNDERSTANDS AND SHE IS SIGNING. EXPLAINED 
THAT AS LONG AS SHE IS ABLE TO MAKE ALL HER OWN DECISIONS, WE DID NOT NEED 
HER POA PAPERWORK. SHE IS IN AGREEMENT. THE GREAT NEPHEWS WIFE WHO IS AT 
BEDSIDE CONTINUED WITH HER RANT, I AGAIN APOLOGIZED AND THE BEDSIDE NURSE AND 
I LEFT THE ROOM. I EXPLAINED TO SCOTTIE THAT THE PATIENT WAS CURRENTLY IN HER 
RIGHT MIND AND SHE IS WANTING THEY KYPHOPLASTY, AND SHE RECONSULTED IR TO SEE 
THE PATIENT.
DCP- Discharge Planning
 
Updated by [id number]: Melany Olivares on 19   4:25 pm CT
CM RECEIVED AN ORDER REGARDING PLANS TO DISCHARGE TO HOME W/ HOSPITAL BED , 
TUBE FEEDING AND HOME HEALTH. PATIENT'S NUTRITION NOTE IS 19. WILL NEED 
NUTRITION NOTE REGARDING PATIENT NEEDS FOR DISCHARGE. CM UNDERSTANDS THE 
PATIENT DOES EAT AND TAKE HER MEDICATIONS BY MOUTH. SHE IS PRESENTLY ON 
FEEDINGS VIA  PUMP. SHE HAS BEEN ADVANCED TO 60 CC/HR TODAY WHICH IS HER GOAL 
RATE.  
SPOKE WITH LIYAH KLINE AND SHE REPORTS NO RESIDUAL AT THIS TIME.  
WILL NEED TO HAVE [facility] PROVIDE BED AND SPECIALTY MATTRESS  FOR HOME 
AND   
DETERMINE COVERAGE FOR TUBE FEEDING AND EQUIPMENT.  
CM TO FOLLOW UP IN AM.
DCP- Discharge Planning
 
Updated by [id number]: Albaro Chambers on 19   7:14 am CT
Patient Name:  CHANTAL TIPTON   
Encounter No:  [id number]   
:  1931   
Primary Insurance:  [employer] CHOICE PPO MCR ADVANT  
Anticipated DC Date: 2019   
Planned Disposition:  Skilled Nursing Facility  
External Planned Provider: [facility], MEDICARE REHAB BED 
  
  
DCP follow-up note: CM FAXED UPDATE TO [facility], 
[phone] FOR REHAB REQUEST.  
  
CM WAITING ADMISSION DETERMINATION FROM [facility] FOR 
REHAB PLACEMENT.  
 
  
ALBARO CHAMBERS, CASE MANAGEMENT
DCP- Discharge Planning
 
Updated by [id number]: Albaro Chambers on 19   3:46 pm CT
Patient Name:  CHANTAL TIPTON   
Encounter No:  [id number]   
:  1931   
Primary Insurance:  [employer] CHOICE PPO MCR ADVANT  
Anticipated DC Date: 2019   
Planned Disposition:  Skilled Nursing Facility  
External Planned Provider:  [facility], MEDICARE REHAB 
BED  
  
  
DCP follow-up note: CM MET WITH PT'S GREAT NEMAOS BY MARRIAGE, AMARI BERMUDEZ, 
[phone].  AMARI STATES THAT JULIANNE BERMUDEZ IS PT'S FIANCE.  PT HAS POWER 
OF  WITH GREAT NEPHEW, JOEL SPOUSE, MAGED BERMUDEZ, WHO WORKS IN 
[employer].  AMARI WILL GET MAGED TO HOSPITAL ALONG WITH POWER OF  
PAPERWORK AS SOON AS POSSIBLE.  THEY ARE IN AGREEMENT WITH REHAB AT [facility], BUT [facility] MAY NOT ACCEPT AS PT HAS CONTINUED DECLINE.  [facility] TO COME TOMORROW TO EVALUATE PT FOR REHAB.  IF [facility] DOES 
NOT ACCEPT FOR REHAB, THEY ARE THINKING TO TAKE PT HOME TO [facility] HERE 
IN [place] AND AMARI ALONG WITH FAMILY WILL CARE FOR PT WITH HOME 
HOSPICE.  AMARI REPORTS THEY WILL MAKE THAT DECISION TOMORROW.  CM EXPLAINED 
THAT THE POWER OF  WILL HAVE TO BE IN AGREEMENT AND SIGN ANY NEEDED 
AUTHORIZATIONS FOR ENROLLMENT IN REHAB OR HOSPICE CARE.  AMARI REPORTS 
UNDERSTANDING, PROVIDED CELL PHONE NUMBER FOR MAGED BERMUDEZ, [phone].  
  
CM WAITING ADMISSION DETERMINATION FROM [facility] FOR 
REHAB PLACEMENT.  
  
ALBARO CHAMBERS CASE MANAGEMENT
 
DCP- Discharge Planning
 
Updated by [id number]: Albaro Chambers on 19   3:34 pm CT
Patient Name:  CHANTAL TIPTON   
Encounter No:  [id number]   
:  1931   
Primary Insurance:  [employer] CHOICE PPO MCR ADVANT  
Anticipated DC Date: 2019   
Planned Disposition:  Skilled Nursing Facility  
External Planned Provider: [facility], MEDICARE REHAB BED 
  
  
DCP follow-up note: CM MET WITH PT AND SON/POA, JULIANNE BERMUDEZ, AT FAMILY 
REQUEST.  MR. BERMUDEZ REPORTS IT IS NOW TIME TO SEND REFERRAL TO [facility] 
FOR REHAB PLACEMENT.  IMPORTANT MESSAGE FROM MEDICARE PROVIDED AND EXPLAINED. 
  
CM FAXED REFERRAL TO [facility], [phone].   
  
 
CM WAITING ADMISSION DETERMINATION FROM [facility] FOR 
REHAB PLACEMENT.  
  
NEERU VILLATORO
DCP- Discharge Planning
 
Updated by [id number]: Albaro Chambers on 19   4:52 pm CT
Patient Name: CHANTAL TIPTON                                     
Admission Status: Elective   
Accout number: [id number]                              
Admission Date: 2019   
: 1931                                                        
Admission Diagnosis:ACUTE KIDNEY FAILURE, UNSPECIFIED   
Attending: SHARATH TORRES                                                
Current LOS:  2   
  
Anticipated DC Date:    
Planned Disposition: Skilled Nursing Facility   
Primary Insurance: [employer] CHOICE PPO MCR ADVANT   
PLANNED EXTERNAL PROVIDER:  [facility], MEDICARE REHAB 
BED  
  
Discharge Planning Comments:   
CM RECEIVED ORDER INDICATING PT WANTS NURSING HOME CARE.  CM MET WITH PT AND 
SON IN ROOM TO DISCUSS DISCHARGE PLANNING AND NEEDS. PT REPORTS THAT BEFORE 
GETTING SICK, SHE WAS LIVING AT HOME WITH HER SON, INDEPENDENT IN HER CARE.  
PT REPORTS SHE DID TELL THE DOCTOR THAT SHE WOULD BE BETTER OFF IN A NURSING 
HOME BEFORE FAMILY ARRIVED TODAY.  PT WANTS REHAB, NOT LONG TERM CARE.   PT 
HAS NO MEDICAL EQUIPMENT AND NO OUTSIDE SERVICES ASSISTING IN THE HOME. CM 
DISCUSSED AVAILABILITY OF HOME HEALTH, REHAB SERVICES AND MEDICAL EQUIPMENT. 
PT'S SON INITIALLY DECLINED TO PARTICIPATE IN DISCHARGE PLANNING REPORTING PT 
WAS SENT TO REHAB DOWNSTAIRS TOO SOON WHEN SHE WAS NOT ABLE TO WALK OR EVEN 
FEED HERSELF. SON WANTS PT CONSIDERED FOR INPATIENT REHAB AGAIN STATING THEY 
SENT HER BACK HERE BECAUSE SHE USED ALL OF HER DAYS DOWN THERE.  PT DOES NOT 
THINK SHE CAN PARTICIPATE IN THREE HOURS OF PROGRESSIVE THERAPY PER DAY.  CM 
EXPLAINED THAT IF PT IS NOT ABLE TO PARTICIPATE IN THE THREE HOURS OF 
PROGRESSIVE THERAPY, IS NOT ABLE TO STAND OR FEED HERSELF, SHE IS NOT GOING 
TO BE APPROPRIATE FOR READMISSION TO INPATENT REHAB.  CM DISCUSSED 
AVAILABILITY OF SKILLED NURSING REHAB SERVICES AND PROVIDED CHOICE FORM.  
PT'S SON REPORTS HE ALREADY SIGNED ONE OF THESE FOR [facility] THE LAST 
VISIT AND SPEAKING WITH THIS CM.  CM EXPLAINED A NEW ONE WAS NEEDED.  PT'S 
SON SIGNED THE FORM AND DOES NOT WANT PT SENT TO REHAB BEFORE SHE IS STABLE.  
CM EXPLAINED THAT ALL DOCTORS WOULD HAVE TO INDICATE PT IS READY TO DISCHARGE 
AND CM IS ONLY WANTING TO BE PROACTIVE ON PT'S BEHALF FOR DISCHARGE PLANNING 
AND SECURE REHAB BED AHEAD OF TIME TO ENSURE THAT PT IS ABLE TO GET INTO [facility] WHEN DISCHARGED AS [facility] IS POPULAR REHAB AND DOES FILL UP.  
PT'S SON WILL WANTS PT TO BE STABLE FOR DISCHARGE FOR CM TO SEND REFERRALS.  
CHOICE SIGNED. CM PROVIDED PT'S SON WITH CM CONTACT INFORMATION.  
  
CM TO SEND REFERRAL TO [facility] WHEN PROJECTED 
DISCHARGE DATE IS KNOWN, AS PT'S SON DOES NOT WANT REHAB REFERRAL SENT OUT 
PRIOR TO PT'S MEDICAL STABILITY FOR DISCHARGE TO REHAB.  
 
  
: Albaro Chambers
 
 DCPIA - Discharge Planning Initial Assessment
 
Updated by [id number]: Albaro Chambers on 19   1:21 pm
*  Is the patient Alert and Oriented?
Yes
*  How many steps to enter\exit or inside your home? NONE *  PCP DR. MCCALL *  Pharmacy
[facility]
*  Preadmission Environment
Acute Inpatient Rehab
*  Facility Name
[facility] INPATIENT REHAB
*  ADLs
Total Dependent
*  Equipment
None
*  Other Equipment
NO MEDICAL EQUIPMENT PROVIDER PREFERECE
*  List name and contact numbers for known caregivers / representatives who 
currently or will assist patient after discharge:
ELIAS KUNZ [phone]
*  Verbal permission to speak to the caregivers and representatives has been 
obtained from the patient.
Yes
*  Community resources currently utilized
None
*  Please name any agencies selected above.
NONE
*  Additional services required to return to the preadmission environment?
Yes
*  Can the patient safely return to the preadmission environment?
Yes
*  Has this patient been hospitalized within the prior 30 days at any 
hospital?
Yes
 
 
 
 
 
Coverage Notice
 
Reviewer: [id number] - Albaro Chambers
 
Notice Issued Date-Time: 2019  13:00
Notice Type: Patient Choice Letter
 
Notice Delivered To: Family Member
Relationship to Patient: Son
Representative Name: JULIANNE BERMUDEZ
 
 
Delivery Method: HAND - Hand Delivered
Ariadne Days:
Prior Verbal Notification: 
 
Recipient Understood Notice: Yes
Recipient Signature: Yes
Med Rec Note Co-signed by Attending:
 
Coverage Notice Comment[facility]  [facility]
Reviewer: [id number] Gopi Chambers
 
Notice Issued Date-Time: 2019  12:20
Notice Type: IM Discharge Notice
 
Notice Delivered To: Family Member
Relationship to Patient: Son
Representative Name: JULIANNE BERMUDEZ
 
Delivery Method: HAND - Hand Delivered
Ariadne Days:
Prior Verbal Notification: 
 
Recipient Understood Notice: Yes
Recipient Signature: Yes
Med Rec Note Co-signed by Attending:
 
Coverage Notice Comment:  
 
Last DP export: 19  12:12 p
Patient Name: CHANTAL TIPTON
Encounter #: [id number]
Page [id number]
 
 
 
 
 
Electronically Signed by CHELE RAMON on 19 at 1322
 
 
 
 
 
 
**All edits/amendments must be made on the electronic document**
 
DICTATION DATE: 19 1321     : KARY  19 1321     
RPT#: [id number]                                DC DATE:        
                                               STATUS: ADM IN  
[facility]
[address] [place] [address]
***END OF REPORT***

## 2019-01-22 VITALS — DIASTOLIC BLOOD PRESSURE: 61 MMHG | SYSTOLIC BLOOD PRESSURE: 149 MMHG

## 2019-01-22 VITALS — DIASTOLIC BLOOD PRESSURE: 65 MMHG | SYSTOLIC BLOOD PRESSURE: 118 MMHG

## 2019-01-22 VITALS — SYSTOLIC BLOOD PRESSURE: 154 MMHG | DIASTOLIC BLOOD PRESSURE: 42 MMHG

## 2019-01-22 VITALS — DIASTOLIC BLOOD PRESSURE: 45 MMHG | SYSTOLIC BLOOD PRESSURE: 132 MMHG

## 2019-01-22 VITALS — SYSTOLIC BLOOD PRESSURE: 167 MMHG | DIASTOLIC BLOOD PRESSURE: 67 MMHG

## 2019-01-22 VITALS — DIASTOLIC BLOOD PRESSURE: 54 MMHG | SYSTOLIC BLOOD PRESSURE: 138 MMHG

## 2019-01-22 LAB
ANION GAP SERPL CALC-SCNC: 10 MMOL/L (ref 8–16)
BASOPHILS NFR BLD AUTO: 0.5 % (ref 0–2)
BUN SERPL-MCNC: 29 MG/DL (ref 7–18)
CALCIUM SERPL-MCNC: 8.1 MG/DL (ref 8.5–10.1)
CHLORIDE SERPL-SCNC: 107 MMOL/L (ref 98–107)
CO2 SERPL-SCNC: 29.7 MMOL/L (ref 21–32)
CREAT SERPL-MCNC: 1.3 MG/DL (ref 0.6–1.3)
EOSINOPHIL NFR BLD: 3.6 % (ref 0–7)
ERYTHROCYTE [DISTWIDTH] IN BLOOD BY AUTOMATED COUNT: 17.4 % (ref 11.5–14.5)
GLUCOSE SERPL-MCNC: 112 MG/DL (ref 74–106)
HCT VFR BLD CALC: 27.7 % (ref 36–48)
HGB BLD-MCNC: 8.6 G/DL (ref 12–16)
IMM GRANULOCYTES NFR BLD: 0.5 % (ref 0–5)
LYMPHOCYTES NFR BLD AUTO: 10.8 % (ref 15–50)
MCH RBC QN AUTO: 31.5 PG (ref 26–34)
MCHC RBC AUTO-ENTMCNC: 31 G/DL (ref 31–37)
MCV RBC: 101.5 FL (ref 80–100)
MONOCYTES NFR BLD: 9.3 % (ref 2–11)
NEUTROPHILS NFR BLD AUTO: 75.3 % (ref 40–80)
OSMOLALITY SERPL CALC.SUM OF ELEC: 287 MOSM/KG (ref 275–300)
PLATELET # BLD: 411 10X3/UL (ref 130–400)
PMV BLD AUTO: 10 FL (ref 7.4–10.4)
POTASSIUM SERPL-SCNC: 5.7 MMOL/L (ref 3.5–5.1)
RBC # BLD AUTO: 2.73 10X6/UL (ref 4–5.4)
SODIUM SERPL-SCNC: 141 MMOL/L (ref 136–145)
WBC # BLD AUTO: 12.9 10X3/UL (ref 4.8–10.8)

## 2019-01-22 NOTE — NUR
AM ROUNDS COMPLETED. INTRODUCED MYSELF TO PT AS PRIMARY RN FOR TODAYS SHIFT.
PT IS A&O SITTING UP IN BED RESTING QUIETLY. PT IS C/O NAUSEA AND DOESNT WANT
TO EAT. STOPPED TUBE FEED AND CHECKED RESIDUAL AND ONLY REC'D 12CC OF STOMACH
CONTENT. PROVIDED PT WITH TYLENOL FOR THE DISCOMFORT AND WILL CONTINUE FEEDS
SINCE NO RESIDUAL NOTED. REPOSITIONED PT UP IN BED FOR COMFORT AND ELEVATED
BILAT HEELS TO HELP PREVENT BREAKDOWN AND RELIEVE PRESSURE. PT HAS A ALEXANDER
CATHETER DRAINING TO GRAVITY OFF R.SIDE OF BED ALEXANDER IS IN R/T RETENTION. PTS
R.CHEST CVL DRSG IS SATURATED AND FALLING OFF. STERILE DRSG CHANGE PROVIDED
AND BIOPATCH INTACT, DRSG NOW ADHERED TO SKIN AND SWAB CAPS IN USE. PEG
INSERTION SITE IS SOILED WITH BLOOD AND LOOKS VERY GROSS. CLEANED SITE WITH
ASEPTIC FOAM CLEANSER AND PATTED DRY THEN PLACED DAVID DRAINAGE SPONGE AND
SECURED WITH TAPE. PT DENIES ANY FURTHER NEEDS AT THIS TIME AND WANTS TO REST.
NO FAMILY PRESENT. CL IN REACH, BED IN LOWEST, SIDE RAILS X2. WILL CTM.

## 2019-01-22 NOTE — MORECARE
CASE MANAGEMENT DISCHARGE SUMMARY
 
 
PATIENT: CHANTAL TIPTON                        UNIT: [id number]
ACCOUNT#: [id number]                       ADM DATE: 19
AGE: 88     : [birth year]  SEX: F            ROOM/BED: D.2110    
AUTHOR: YENNY,DOC                             PHYSICIAN:                               
 
REFERRING PHYSICIAN: SHARATH TORRES MD               
DATE OF SERVICE: 19
Discharge Plan
 
 
Patient Name: CHANTAL TIPTON
Facility: [facility]:[place]
Encounter #: [id number]
Medical Record #: [id number]
: 1931
Planned Disposition: Home with Home Health
Anticipated Discharge Date: 19
 
Discharge Date: 
Expected LOS: 21
Initial Reviewer: [id number]
Initial Review Date: 2019
Generated: 19   5:09 pm 
Comments
 
DCP- Discharge Planning
 
Updated by [id number]: Albaro Chambers on 19   3:06 pm CT
Patient Name:  CHANTAL TIPTON   
Encounter No:  [id number]   
:  1931   
Primary Insurance:  HU[employer]NA CHOICE PPO MCR ADVANT  
Anticipated DC Date: 2019   
Planned Disposition:  Home with Home Health  
External Planned Provider: [facility]  
  
  
DCP follow-up note: CM CALLED DWAINE AT [facility], [phone], PROVIDED 
INFORMATION FOR HOSPITAL BED AND BEDSIDE COMMODE, INFORMED OF PLANNED 
DISCHARGE TOMORROW.  CM FAXED ORDER AND REFERRAL INFORMATION TO [facility] AT 
[phone].  [facility] TO ARRANGE DELIVERY OF MEDICAL EQUIPMENT WITH PT'S 
FAMILY.  
  
CM CALLED [facility], [phone], SPOKE TO MARGARET 
REGARDING TUBE FEEDING ORDER; MARGARET ADVISED THAT PT'S INSURANCE WILL NOT PAY 
FOR SUPPLEMENTAL NUTRITION AS PT IS ON REGULAR DIET AND ABLE TO EAT BY MOUTH. 
 MARGARET WILL REVIEW REFERRAL TO SEE IF THERE IS SOME WAY THAT INSURANCE MAY 
 
COVER THE SERVICE.  CM SPOKE TO PT IN ROOM, INFORMED OF ABOVE; PT ASKED FOR 
CM TO GET CASH PAY PRICE FOR TUBE FEEDING AND DISCUSS WITH GINGER, HER NIECE. 
 CM FAXED REFERRAL TO [facility] -[phone].  
  
CM CALLED [facility], [phone], SPOKE TO FILI, PROVIDED 
REFERRAL INFORMATION AND ADVISED OF PLANNED DISCHARGE 19.  THEY WILL 
ACCEPT PT FOR ADMIT ON 19.  CM FAXED HOME HEALTH REFERRAL TO [facility] AT 
[phone].  CM SPOKE TO GINGER [place] IN HALLWAY AS DIRECTED BY PT; 
INFORMED OF ABOVE ARRANGEMENTS.  CM SPOKE TO BEDSIDE NURSE REGARDING FAMILY 
CONCERN OF OXYGEN NEED, BEDSIDE NURSE INFORMED CM THAT SHE WOULD ATTEMPT 
OXYGEN WEANING.  
  
[facility] ARRANGING HOSPITAL BED AND BEDSIDE COMMODE WITH FAMILY.
  PT'S INSURANCE WILL NOT PAY FOR TUBE FEEDING MATERIALS OR SUPPLIES - 
Hospitals in [place] INFUSION GETTING QUOTE FOR CASH PAY.  [facility] WILL ACCEPT PT AT DISCHARGE.    
  
FOR DISCHARGE, NOTIFY [facility] -[phone], FAX DISCHARGE 
INFORMATION TO [facility] -[phone].  CM TO CONTINUE TO FOLLOW AND ASSIST AS 
NEEDED.  
  
  
Albaro Chambers, CASE MANGEMENT  
  
Appended by Albaro Chambers on 2019 16:06 CST:  
CM SPOKE TO PT'S GREAT NIECE IN LAW WHO INFORMED CM THAT [facility] HAS CALLED 
TO ARRANGE DELIVERY OF HOSPITAL BED AND BEDSIDE COMMODE.  SHE HAS NOT HEARD 
FROM [facility] REGARDING TUBE FEEDING.  CM CALLED [facility] INFUSION, [phone], SPOKE TO DAVIN WHO REPORTS INSURANCE 
WILL NOT COVER COSTS OF TUBE FEEDING AND PROVIDED QUOTE OVER THE PHONE AND 
WILL FAX QUOTE TO CM FOR PT'S FAMILY.  BEDSIDE NURSE NOTIFIED.  PT'S FAMILY 
ARE NOT IN ROOM, CM LEFT NOTE ON WHITE BOARD FOR FAMILY NOTIFYING THAT TUBE 
FEEDING COSTS ARE NOT COVERED BY INSURANCE WITH QUOTED PRICES.  
  
[facility]OSPITAL BED AND BEDSIDE COMMODE WITH FAMILY.
  PT'S INSURANCE WILL NOT PAY FOR TUBE FEEDING MATERIALS OR SUPPLIES - 
[facility] INFUSION QUOTED FOR CASH PAY OF $8 PER DAY FOR 
PUMP AND SUPPLIES / $1.50 PER CAN FOR JEVITY 1.2.  [facility] WILL 
ACCEPT PT AT DISCHARGE.    
  
FOR DISCHARGE, NOTIFY [facility] -[phone], FAX DISCHARGE 
INFORMATION TO [facility] -[phone].  CM TO CONTINUE TO FOLLOW AND ASSIST AS 
NEEDED.  
  
  
Albaro Chambers, CASE MANGEMENT
DCP- Discharge Planning
 
Updated by [id number]: Albaro Chambers on 19   4:27 pm CT
Patient Name:  CHANTAL TIPTON   
Encounter No:  [id number]   
:  1931   
 
Primary Insurance:  HUMANA CHOICE PPO MCR ADVANT  
Anticipated DC Date: 2019   
Planned Disposition:  HOME WITH HOME HEALTH  
External Planned Provider: [facility]  
  
  
DCP follow-up note: CM RECEIVED ORDER FOR HOME HEALTH, TUBE FEEDING AND 
HOSPITAL BED.  CM MET WITH PT AND GRAND NEPHEW'S SPOUSE IN ROOM.  CM BEGAN 
DISCUSSING ORDER, PT'S GRAND NEPHEW'S SPOUSE INFORMED CM THAT PT PLANS TO 
DISCHARGE TO [place] FOR REHAB AND ASKED IF CM HAS HEARD FROM [place] AS SHE DID NOT SEE THEM FRIDAY AS THEY WERE SUPPOSED TO EVALUATE PT 
THEN.  CM CALLED [facility] AND REHAB, SPOKE TO LATONYA WHO WILL 
CHECK WITH MIRANAD TO FIND OUT WHAT HAS HAPPENED.  LATONYA WILL HAVE MIRANDA CALL 
CM AS SOON AS POSSIBLE.    
CM MET WITH PT, PT'S ELIAS AND PT'S GRAND NEPHEW'S NIECE.  PT'S ELIAS 
INFORMED CM THAT CM DOES NOT NEED TO SEE THEM AND THAT DR. LEPE HAS 
TAKEN CARE OF EVERYTHING. CM ASKED WHAT HAD BEEN WORKED OUT.  CM WAS ADVISED 
THAT PT WILL HAVE THE KYPHOPLASTY ON WEDNESDAY AND THEN GO TO [place] 
FOR REHAB, DR. LEPE WILL TALK TO MIRANDA AT [place] AND WORK THIS 
OUT.  CM NOTIFIED LIZZY EMANUEL AND DR. LEPE.    
  
CM RECEIVED CALL BACK FROM MIRANDA WHO SPOKE TO FAMILY, INFORMED THEM THAT PT 
IS NOT APPROPRIATE CANDIDATE FOR REHAB AS SHE IS REFUSING THERAPY SERICES AND 
 INFORMED CM THAT FAMILY HAS REFUSED LONG TERM CARE PLACEMENT AT [facility] AND REHAB.    
  
CM MET WITH PT AND PT'S GRAND NEPHEWS SPOUSE IN ROOM.  CM DISCUSSED IMPORTANT 
MESSAGE FROM MEDICARE AND DISCUSSED HOW TO COMPLAIN IF NEEDED TO HOUSE 
SUPERVISOR, ADMINISTRATION AND IF NEEDED, QUALITY IMPROVEMENT OFFICE FOR 
MEDICARE.  GINGER INFORMED CM THAT SHE HAS COMPLAINED TO THE DOCTORS BUT HAS 
BEEN THINKING SHE NEEDS TO COMPLAIN TO SOMEONE ELSE. CM DISCUSSED DISCHARGE 
PLAN.  GINGER HAS TALKED TO PT, PT'S ELIAS AND MIRANDA AT [place].  THEY 
ARE NOT GOING TO PUT PT INTO LONG TERM CARE AND WILL BE TAKING PT HOME AS 
DISCUSSED LAST WEEK; GINGER STATES PT IS NOT READY FOR HOSPICE BUT WANTS HOME 
HEALTH.  HOME HEALTH LISTING PROVIDED AND DISCUSSED, GINGER WANTS TO USE THE 
HIGHEST RATED COMPANY, PT TOLD GINGER TO SIGN THE FORMS.  GINGER SIGNED THE 
IMPORTANT MESSAGE FROM MEDICARE AND [facility] FOR [facility].  GINGER 
STATES THAT THEY WILL NEED A HOSPITAL BED, BEDSIDE COMMODE, OXYGEN AND TUBE 
FEEDING SET UP FOR PT AT HOME. CM EXPLAINED THAT PT'S INSURANCE HAS TO PRE 
AUTHORIZE ALL MEDICAL EQUIPMENT AND THAT SINCE PT IS ABLE TO EAT, INSURANCE 
MAY NOT COVER TUBE FEEDING.  THEY HAVE NO PREFERENCE ON PROVIDER AND GINGER 
ASKED TO BE CONTACTED REGARDING ANY COPAYS FOR EQUIPMENT -[phone].  
  
CM HAS ORDER FOR HOME HEALTH, HOSPITAL BED AND TUBE FEEDING.  CM WILL NEED 
ADDITIONAL ORDERS FOR BEDSIDE COMMODE AND OXYGEN TESTING.    
  
CM TO FIND IN NETWORK PROVIDER FOR PT'S INSURANCE TO ARRANGE NEEDED MEDICAL 
EQUIPMENT AS SOON AS POSSIBLE.  CM TO COMPLETE HOME HEALTH ARRANGEMENTS WITH 
[facility].  
  
Albaro Chambers
DCP- Discharge Planning
 
 
Updated by [id number]: Marli Vickers on 19  11:53 am CT
RECEIVED NOTICE THAT THE PATIENTS FAMILY WAS WANTING HER TO HAVE THE 
KYPHOPLASTY BEFORE SHE LEAVES THE HOSPITAL. IT WAS MENTIONED THAT THE 
PATIENTS POA HAS NOT BEEN HER AND THERE IS NO POA PAPERWORK IN THE CHART. I 
ASKED THE BEDSIDE NURSE PENG ENCARNACION TO COME INTO THE ROOM WITH ME TO DISCUSS 
THE NEED FOR POA PAPERWORK IN ORDER TO HAVE ANY CONSENTS FOR PROCEDURES 
SIGNED SINCE THE PATIENT WAS NOT ABLE TO SIGN HERSELF, AND BY THE HIERACHY OF 
LAW, THE POA OR NEXT OF KIN WOULD BE WHO NEEDED TO SIGN AND SINCE THEY (THE 
LADY AND FIJUVENTINO) WERE NOT BLOOD RELATED, THEY COULD NOT SIGN FOR HER AS THEY 
HAVE BEEN. AT THIS TIME I WAS QUICKLY TOLD THAT SHE WAS IN HER RIGHT MIND AND 
ABLE TO MAKE ALL HER OWN DECISIONS. THE LADY AT BEDSIDE PROCEEDED TO TELL ME 
ABOUT ALL THE TIMES PEOPLE CAME IN AND QUESTIONED HER ABOUT THE DATE, TIME, 
PRESIDENT, ETC AND SHE WAS ABLE TO ALWAYS ANSWER ALL THEIR QUESTIONS. SHE 
STATED THE PATIENT WAS ABLE TO SIGN FOR HERSELF. UP TO THIS POINT, THE 
PATIENT HAD NOT OPENED HER EYES AND HAD NOT SAID ANYTHING. IT TOOK ME 
TOUCHING THE PATIENT'S ARM TO GET HER TO OPEN HER EYES AND ACKNOWLEDGE MY 
PRESENCE AND ANSWER MY QUESTIONS. THE PATIENT IS WITH IT AND ABLE TO ANSWER 
QUESTIONS. AT THIS POINT I ASKED HER IF SHE WANTED TO HAVE THE KYPHOPLASTY 
DONE. SHE STATED YES. THE LADY AT BEDSIDE BECAME VERY AGITATED STATING THAT 
SHE KNEW ABOUT HIPPA LAWS AND WANTED TO KNOW WHY ANYONE EVEN ALLOWED THINGS 
TO HAPPEN UP UNTIL NOW. NO ONE HAD ASKED FOR ANYONE'S DRIVERS LICENES TO 
VERIFY THEY WERE WHO THEY SAID THEY WERE. I TRIED TO EXPLAINE THAT EVEN WITH 
THAT, WE HAD NO WAY TO ACTUALLY VERIFY THAT WITH A DRIVERS LICENSE BECAUSE 
ANYONE CAN SAY THEY ARE SOMEONES FAMILY AND WE DON'T KNOW OTHERWISE UNLESS 
THE PATIENT SAYS SO, BUT SHE CUT ME OFF AS I WAS TALKING. I STOOD AT BEDSIDE 
AND LISTENED TO HER RANT ABOUT ALL THE THINGS THAT SHE HAS WITNESSED HERE AT 
THIS HOSPITAL THAT HAS MADE HER SICK TO HER STOMACH ESPECIALLY ABOUT THE FACT 
THAT SHE CAME IN HERE WITH A HURT BACK AND NOW THEY ARE JUST WATCHING HER DIE.
 I APOLOGIZED TO HER ABOUT ALL OF THE THINGS THAT MARIA ALEJANDRA WAS UPSET ABOUT, AND 
THEN DIRECTED MY CONVERSATION TO THE PATIENT, EXPLAINING THAT IF SHE WANTED 
THEY KYPHOPLASTY AND HER URINE WAS CLEAR THAT I WOULD EXPLAIN THIS TO THE 
NURSE PRACTITIONER. THAT ULTIMATELY I AM HERE TO TAKE CARE OF HER AND MAKE 
SURE WE ARE DOING WHAT SHE WANTS. I ALSO EXPLAINED TO HER THAT SHE NEEDS TO 
SIGN ALL OF HER OWN PAPERWORK, EVEN IF IT IS JUST WITH AN "X". THAT WE SIGN 
AS WITNESSES TO STATE WE KNOW SHE UNDERSTANDS AND SHE IS SIGNING. EXPLAINED 
THAT AS LONG AS SHE IS ABLE TO MAKE ALL HER OWN DECISIONS, WE DID NOT NEED 
HER POA PAPERWORK. SHE IS IN AGREEMENT. THE GREAT NEPHEWS WIFE WHO IS AT 
BEDSIDE CONTINUED WITH HER RANT, I AGAIN APOLOGIZED AND THE BEDSIDE NURSE AND 
I LEFT THE ROOM. I EXPLAINED TO SCOTTIE THAT THE PATIENT WAS CURRENTLY IN HER 
RIGHT MIND AND SHE IS WANTING THEY KYPHOPLASTY, AND SHE RECONSULTED IR TO SEE 
THE PATIENT.
DCP- Discharge Planning
 
Updated by [id number]: Melany Olivares on 19   4:25 pm CT
CM RECEIVED AN ORDER REGARDING PLANS TO DISCHARGE TO HOME W/ HOSPITAL BED , 
TUBE FEEDING AND HOME HEALTH. PATIENT'S NUTRITION NOTE IS 19. WILL NEED 
NUTRITION NOTE REGARDING PATIENT NEEDS FOR DISCHARGE. CM UNDERSTANDS THE 
PATIENT DOES EAT AND TAKE HER MEDICATIONS BY MOUTH. SHE IS PRESENTLY ON 
FEEDINGS VIA  PUMP. SHE HAS BEEN ADVANCED TO 60 CC/HR TODAY WHICH IS HER GOAL 
RATE.  
SPOKE WITH LIYAH KLINE AND SHE REPORTS NO RESIDUAL AT THIS TIME.  
 
WILL NEED TO HAVE [facility] [place] BED AND SPECIALTY MATTRESS  FOR HOME 
AND   
DETERMINE COVERAGE FOR TUBE FEEDING AND EQUIPMENT.  
CM TO FOLLOW UP IN AM.
DCP- Discharge Planning
 
Updated by [id number]: Albaro Chambers on 19   7:14 am CT
Patient Name:  CHANTAL TIPTON   
Encounter No:  [id number]   
:  1931   
Primary Insurance:  HU[employer] CHOICE PPO MCR ADVANT  
Anticipated DC Date: 2019   
Planned Disposition:  Skilled Nursing Facility  
External Planned Provider: [facility], MEDICARE REHAB BED 
  
  
DCP follow-up note: CM FAXED UPDATE TO [facility], 
[phone] FOR REHAB REQUEST.  
  
CM WAITING ADMISSION DETERMINATION FROM [facility] FOR 
REHAB PLACEMENT.  
  
ALBARO CHAMBERS CASE MANAGEMENT
DCP- Discharge Planning
 
Updated by [id number]: Albaro Chambers on 19   3:46 pm CT
Patient Name:  CHANTAL TIPTON   
Encounter No:  [id number]   
:  1931   
Primary Insurance:  HU[employer] CHOICE PPO MCR ADVANT  
Anticipated DC Date: 2019   
Planned Disposition:  Skilled Nursing Facility  
External Planned Provider:  [facility], MEDICARE REHAB 
BED  
  
  
DCP follow-up note: CM MET WITH PT'S GREAT NEAMOS BY MARRIAGE, JOHNNIEDENNIS BERMUDEZ, 
[phone].  GINGER STATES THAT JULIANNE BERMUDEZ IS PT'S FIANCE.  PT HAS POWER 
OF  WITH GREAT NEPHEW, JOHNNIEVALARIE SPOUSE, MAGED BERMUDEZ, WHO WORKS IN 
[employer].  GINGER WILL GET MAGED TO HOSPITAL ALONG WITH POWER OF  
PAPERWORK AS SOON AS POSSIBLE.  THEY ARE IN AGREEMENT WITH REHAB AT [place], BUT [place] MAY NOT ACCEPT AS PT HAS CONTINUED DECLINE.  [place] TO COME TOMORROW TO EVALUATE PT FOR REHAB.  IF [place] DOES 
NOT ACCEPT FOR REHAB, THEY ARE THINKING TO TAKE PT HOME TO [facility] HERE 
IN [place] AND JOHNNIEDENNIS ALONG WITH FAMILY WILL CARE FOR PT WITH HOME 
HOSPICE.  GINGER REPORTS THEY WILL MAKE THAT DECISION TOMORROW.  CM EXPLAINED 
THAT THE POWER OF  WILL HAVE TO BE IN AGREEMENT AND SIGN ANY NEEDED 
AUTHORIZATIONS FOR ENROLLMENT IN REHAB OR HOSPICE CARE.  GINGER REPORTS 
UNDERSTANDING, PROVIDED CELL PHONE NUMBER FOR MAGED BERMUDEZ, [phone].  
  
CM WAITING ADMISSION DETERMINATION [facility] 
[facility]AC[facility].  
 
  
ALBARO CHAMBERS CASE MANAGEMENT
 
DCP- Discharge Planning
 
Updated by [id number]: Albaro Chambers on 19   3:34 pm CT
Patient Name:  CHANTAL TIPTON   
Encounter No:  [id number]   
:  1931   
Primary Insurance:  [employer] CHOICE PPO MCR ADVANT  
Anticipated DC Date: 2019   
Planned Disposition:  Skilled Nursing Facility  
External Planned Provider: [facility], MEDICARE REHAB BED 
  
  
DCP follow-up note: CM MET WITH PT AND SON/POA, JULIANNE BERMUDEZ, AT FAMILY 
REQUEST.  MR. BERMUDEZ REPORTS IT IS NOW TIME TO SEND REFERRAL TO [place] 
FOR REHAB PLACEMENT.  IMPORTANT MESSAGE FROM MEDICARE PROVIDED AND EXPLAINED. 
  
CM FAXED REFERRAL TO [facility], [phone].   
  
CM WAITING ADMISSION DETERMINATION FROM [facility] FOR 
REHAB PLACEMENT.  
  
ALBARO CHAMBERS, CASE MANAGEMENT
DCP- Discharge Planning
 
Updated by [id number]: Albaro Chambers on 19   4:52 pm CT
Patient Name: CHANTAL TIPTON                                     
Admission Status: Elective   
Accout number: [id number]                              
Admission Date: 2019   
: 1931                                                        
Admission Diagnosis:ACUTE KIDNEY FAILURE, UNSPECIFIED   
Attending: SHARATH TORRES                                                
Current LOS:  2   
  
Anticipated DC Date:    
Planned Disposition: Skilled Nursing Facility   
Primary Insurance: HU[employer]NA CHOICE PPO MCR ADVANT   
PLANNED EXTERNAL PROVIDER:  [facility], MEDICARE REHAB 
BED  
  
Discharge Planning Comments:   
CM RECEIVED ORDER INDICATING PT WANTS NURSING HOME CARE.  CM MET WITH PT AND 
SON IN ROOM TO DISCUSS DISCHARGE PLANNING AND NEEDS. PT REPORTS THAT BEFORE 
GETTING SICK, SHE WAS LIVING AT HOME WITH HER SON, INDEPENDENT IN HER CARE.  
PT REPORTS SHE DID TELL THE DOCTOR THAT SHE WOULD BE BETTER OFF IN A NURSING 
HOME BEFORE FAMILY ARRIVED TODAY.  PT WANTS REHAB, NOT LONG TERM CARE.   PT 
HAS NO MEDICAL EQUIPMENT AND NO OUTSIDE SERVICES ASSISTING IN THE HOME. CM 
DISCUSSED AVAILABILITY OF HOME HEALTH, REHAB SERVICES AND MEDICAL EQUIPMENT. 
PT'S SON INITIALLY DECLINED TO PARTICIPATE IN DISCHARGE PLANNING REPORTING PT 
 
WAS SENT TO REHAB DOWNSTAIRS TOO SOON WHEN SHE WAS NOT ABLE TO WALK OR EVEN 
FEED HERSELF. SON WANTS PT CONSIDERED FOR INPATIENT REHAB AGAIN STATING THEY 
SENT HER BACK HERE BECAUSE SHE USED ALL OF HER DAYS DOWN THERE.  PT DOES NOT 
THINK SHE CAN PARTICIPATE IN THREE HOURS OF PROGRESSIVE THERAPY PER DAY.  CM 
EXPLAINED THAT IF PT IS NOT ABLE TO PARTICIPATE IN THE THREE HOURS OF 
PROGRESSIVE THERAPY, IS NOT ABLE TO STAND OR FEED HERSELF, SHE IS NOT GOING 
TO BE APPROPRIATE FOR READMISSION TO INPATENT REHAB.  CM DISCUSSED 
AVAILABILITY OF SKILLED NURSING REHAB SERVICES AND PROVIDED CHOICE FORM.  
PT'S SON REPORTS HE ALREADY SIGNED ONE OF THESE FOR [place] THE LAST 
VISIT AND SPEAKING WITH THIS CM.  CM EXPLAINED A NEW ONE WAS NEEDED.  PT'S 
SON SIGNED THE FORM AND DOES NOT WANT PT SENT TO REHAB BEFORE SHE IS STABLE.  
CM EXPLAINED THAT ALL DOCTORS WOULD HAVE TO INDICATE PT IS READY TO DISCHARGE 
AND CM IS ONLY WANTING TO BE PROACTIVE ON PT'S BEHALF FOR DISCHARGE PLANNING 
AND SECURE REHAB BED AHEAD OF TIME TO ENSURE THAT PT IS ABLE TO GET INTO [place] WHEN DISCHARGED AS [place] IS POPULAR REHAB AND DOES FILL UP.  
PT'S SON WILL WANTS PT TO BE STABLE FOR DISCHARGE FOR CM TO SEND REFERRALS.  
CHOICE SIGNED. CM PROVIDED PT'S SON WITH CM CONTACT INFORMATION.  
  
CM TO SEND REFERRAL TO [place] [facility] AND [facility]HAB WHEN PROJECTED 
DISCHARGE DATE IS KNOWN, AS PT'S SON DOES NOT WANT REHAB REFERRAL SENT OUT 
PRIOR TO PT'S MEDICAL STABILITY FOR DISCHARGE TO REHAB.  
  
: Albaro Chambers
 
 DCPIA - Discharge Planning Initial Assessment
 
Updated by [id number]: Albaro Chambers on 19   1:21 pm
*  Is the patient Alert and Oriented?
Yes
*  How many steps to enter\exit or inside your home? NONE *  PCP DR. MCCALL *  Pharmacy
[facility]
*  Preadmission Environment
Acute Inpatient Rehab
*  Facility Name
[facility] INPATIENT REHAB
*  ADLs
Total Dependent
*  Equipment
None
*  Other Equipment
NO MEDICAL EQUIPMENT PROVIDER PREFERECE
*  List name and contact numbers for known caregivers / representatives who 
currently or will assist patient after discharge:
ELIAS KUNZ [phone]
*  Verbal permission to speak to the caregivers and representatives has been 
obtained from the patient.
Yes
*  Community resources currently utilized
None
*  Please name any agencies selected above.
NONE
*  Additional services required to return to the preadmission environment?
 
Yes
*  Can the patient safely return to the preadmission environment?
Yes
*  Has this patient been hospitalized within the prior 30 days at any 
hospital?
Yes
 
 
 
 
 
Coverage Notice
 
Reviewer: [id number] - Albaro Chambers
 
Notice Issued Date-Time: 2019  13:00
Notice Type: Patient Choice Letter
 
Notice Delivered To: Family Member
Relationship to Patient: Other Relationship
Representative Name: JULIANNE BERMUDEZ
 
Delivery Method: HAND - Hand Delivered
Ariadne Days:
Prior Verbal Notification: 
 
Recipient Understood Notice: Yes
Recipient Signature: Yes
Med Rec Note Co-signed by Attending:
 
Coverage Notice Comment:  [facility]
Reviewer: WILLIAN Chambers
 
Notice Issued Date-Time: 2019  12:20
Notice Type: IM Discharge Notice
 
Notice Delivered To: Family Member
Relationship to Patient: Other Relationship
Representative Name: JULIANNE BERMUDEZ
 
Delivery Method: HAND - Hand Delivered
Ariadne Days:
Prior Verbal Notification: 
 
Recipient Understood Notice: Yes
Recipient Signature: Yes
Med Rec Note Co-signed by Attending:
 
Coverage Notice Comment:  
Reviewer: WILLIAN Chambers
 
Notice Issued Date-Time: 2019  16:20
 
Notice Type: IM Discharge Notice
 
Notice Delivered To: Family Member
Relationship to Patient: Other Relationship
Representative Name: AMARI BERMUDEZ
 
Delivery Method: HAND - Hand Delivered
Ariadne Days:
Prior Verbal Notification: 
 
Recipient Understood Notice: Yes
Recipient Signature: Yes
Med Rec Note Co-signed by Attending:
 
Coverage Notice Comment:  
Reviewer: WILLIAN Chambers
 
Notice Issued Date-Time: 2019  16:20
Notice Type: Patient Choice Letter
 
Notice Delivered To: Family Member
Relationship to Patient: Other Relationship
Representative Name: AMARI BERMUDEZ
 
Delivery Method: HAND - Hand Delivered
Ariadne Days:
Prior Verbal Notification: 
 
Recipient Understood Notice: Yes
Recipient Signature: Yes
Med Rec Note Co-signed by Attending:
 
Coverage Notice Comment:  [facility]
 
Last DP export: 19   1:57 p
Patient Name: CHANTAL TIPTON
 
Encounter #: [id number]
Page [id number]
 
 
 
 
 
Electronically Signed by CHELE RAMON on 19 at 1609
 
 
 
 
 
 
**All edits/amendments must be made on the electronic document**
 
DICTATION DATE: 19     : KARY  19 1609     
RPT#: [id number]                                DC DATE:        
                                               STATUS: ADM IN  
[facility]
[address] [place] [address]
***END OF REPORT***

## 2019-01-22 NOTE — NUR
Nutrition consult:
Pt with Jevity 1.2 aubrey infusing @ 60 ml/hr
PO intake today very poor
K trending up -> 5.7
Noted Nephrologist order to change TF formula to Nepro due to elevated K.
TF off at this time due to pt feeling full.  Recommend continue to hold TF D/T
pt going for kyphoplasty 1/23.  Pt discharging to home per CM.  Insurance will
not pay for TF formula due to pt with a functional swallow.
After discharge, PEG tube will only be safe for hydration with H2O flushes.
There is no safe over-the-counter liquid nutritional supplement available for
TF.  Over-the-counter liquid nutritional supplements will clog the PEG tube.
Thank you for the consult.

## 2019-01-22 NOTE — MORECARE
CASE MANAGEMENT DISCHARGE SUMMARY
 
 
PATIENT: CHANTAL TIPTON                        UNIT: [id number]
ACCOUNT#: [id number]                       ADM DATE: 19
AGE: 88     : [birth year]  SEX: F            ROOM/BED: D.2112    
AUTHOR: YENNY,DOC                             PHYSICIAN:                               
 
REFERRING PHYSICIAN: SHARATH TORRES MD               
DATE OF SERVICE: 19
Discharge Plan
 
 
Patient Name: CHANTAL TIPTON
Facility: [facility]
Encounter #: [id number]
Medical Record #: [id number]
: 1931
Planned Disposition: Home with Home Health
Anticipated Discharge Date: 19
 
Discharge Date: 
Expected LOS: 21
Initial Reviewer: [id number]
Initial Review Date: 2019
Generated: 19  12:24 pm 
Comments
 
DCP- Discharge Planning
 
Updated by [id number]: Albaro Chambers on 19   4:27 pm CT
Patient Name:  CHANTAL TIPTON   
Encounter No:  [id number]   
:  1931   
Primary Insurance:  HU[employer]NA CHOICE PPO MCR ADVANT  
Anticipated DC Date: 2019   
Planned Disposition:  HOME WITH HOME HEALTH  
External Planned Provider: [facility]  
  
  
DCP follow-up note: CM RECEIVED ORDER FOR HOME HEALTH, TUBE FEEDING AND 
HOSPITAL BED.  CM MET WITH PT AND GRAND NEPHEW'S SPOUSE IN ROOM.  CM BEGAN 
DISCUSSING ORDER, PT'S GRAND NEPHEW'S SPOUSE INFORMED CM THAT PT PLANS TO 
DISCHARGE TO [place] FOR REHAB AND ASKED IF CM HAS HEARD FROM [facility] AS SHE DID NOT SEE THEM FRIDAY AS THEY WERE SUPPOSED TO EVALUATE PT 
THEN.  CM CALLED [facility], SPOKE TO LATONYA WHO WILL 
CHECK WITH MIRANDA TO FIND OUT WHAT HAS HAPPENED.  LATONYA WILL HAVE MIRANDA CALL 
CM AS SOON AS POSSIBLE.    
CM MET WITH PT, PT'S ELIAS AND PT'S GRAND NEPHEW'S NIECE.  PT'S ELIAS 
INFORMED CM THAT CM DOES NOT NEED TO SEE THEM AND THAT DR. LEPE HAS 
 
TAKEN CARE OF EVERYTHING. CM ASKED WHAT HAD BEEN WORKED OUT.  CM WAS ADVISED 
THAT PT WILL HAVE THE KYPHOPLASTY ON WEDNESDAY AND THEN GO TO [place] 
FOR REHAB, DR. LEPE WILL TALK TO MIRANDA AT [facility] AND WORK THIS 
OUT.  CM NOTIFIED LIZZY EMANUEL AND DR. LEPE.    
  
CM RECEIVED CALL BACK FROM MIRANDA WHO SPOKE TO FAMILY, INFORMED THEM THAT PT 
IS NOT APPROPRIATE CANDIDATE FOR REHAB AS SHE IS REFUSING THERAPY SERICES AND 
 INFORMED CM THAT FAMILY HAS REFUSED LONG TERM CARE PLACEMENT AT [facility].    
  
CM MET WITH PT AND PT'S GRAND NEPHEWS SPOUSE IN ROOM.  WHITNEY DISCUSSED IMPORTANT 
MESSAGE FROM MEDICARE AND DISCUSSED HOW TO COMPLAIN IF NEEDED TO HOUSE 
SUPERVISOR, ADMINISTRATION AND IF NEEDED, QUALITY IMPROVEMENT OFFICE FOR 
MEDICARE.  AMARI INFORMED CM THAT SHE HAS COMPLAINED TO THE DOCTORS BUT HAS 
BEEN THINKING SHE NEEDS TO COMPLAIN TO SOMEONE ELSE. CM DISCUSSED DISCHARGE 
PLAN.  AMARI HAS TALKED TO PT, PT'S ELIAS AND MIRANDA AT [facility].  THEY 
ARE NOT GOING TO PUT PT INTO LONG TERM CARE AND WILL BE TAKING PT HOME AS 
DISCUSSED LAST WEEK; AMARI STATES PT IS NOT READY FOR HOSPICE BUT WANTS HOME 
HEALTH.  HOME HEALTH LISTING PROVIDED AND DISCUSSED, AMARI WANTS TO USE THE 
HIGHEST RATED [employer], PT TOLD AMARI TO SIGN THE FORMS.  AMARI SIGNED THE 
IMPORTANT MESSAGE FROM MEDICARE AND CHOICE FOR [employer].  AMARI 
STATES THAT THEY WILL NEED A HOSPITAL BED, BEDSIDE COMMODE, OXYGEN AND TUBE 
FEEDING SET UP FOR PT AT HOME. WHITNEY EXPLAINED THAT PT'S INSURANCE HAS TO PRE 
AUTHORIZE ALL MEDICAL EQUIPMENT AND THAT SINCE PT IS ABLE TO EAT, INSURANCE 
MAY NOT COVER TUBE FEEDING.  THEY HAVE NO PREFERENCE ON PROVIDER AND AMARI 
ASKED TO BE CONTACTED REGARDING ANY COPAYS FOR EQUIPMENT [phone].  
  
CM HAS ORDER FOR HOME HEALTH, HOSPITAL BED AND TUBE FEEDING.  CM WILL NEED 
ADDITIONAL ORDERS FOR BEDSIDE COMMODE AND OXYGEN TESTING.    
  
CM TO FIND IN NETWORK PROVIDER FOR PT'S INSURANCE TO ARRANGE NEEDED MEDICAL 
EQUIPMENT AS SOON AS POSSIBLE.  CM TO COMPLETE HOME HEALTH ARRANGEMENTS WITH 
[employer].  
  
Albaro Chambers
DCP- Discharge Planning
 
Updated by [id number]: Marli Vickers on 19  11:53 am CT
RECEIVED NOTICE THAT THE PATIENTS FAMILY WAS WANTING HER TO HAVE THE 
KYPHOPLASTY BEFORE SHE LEAVES THE HOSPITAL. IT WAS MENTIONED THAT THE 
PATIENTS POA HAS NOT BEEN HER AND THERE IS NO POA PAPERWORK IN THE CHART. I 
ASKED THE BEDSIDE NURSE PENG ENCARNACION TO COME INTO THE ROOM WITH ME TO DISCUSS 
THE NEED FOR POA PAPERWORK IN ORDER TO HAVE ANY CONSENTS FOR PROCEDURES 
SIGNED SINCE THE PATIENT WAS NOT ABLE TO SIGN HERSELF, AND BY THE HIERACHY OF 
LAW, THE POA OR NEXT OF KIN WOULD BE WHO NEEDED TO SIGN AND SINCE THEY (THE 
LADY AND ELIAS) WERE NOT BLOOD RELATED, THEY COULD NOT SIGN FOR HER AS THEY 
HAVE BEEN. AT THIS TIME I WAS QUICKLY TOLD THAT SHE WAS IN HER RIGHT MIND AND 
ABLE TO MAKE ALL HER OWN DECISIONS. THE LADY AT BEDSIDE PROCEEDED TO TELL ME 
ABOUT ALL THE TIMES PEOPLE CAME IN AND QUESTIONED HER ABOUT THE DATE, TIME, 
PRESIDENT, ETC AND SHE WAS ABLE TO ALWAYS ANSWER ALL THEIR QUESTIONS. SHE 
STATED THE PATIENT WAS ABLE TO SIGN FOR HERSELF. UP TO THIS POINT, THE 
PATIENT HAD NOT OPENED HER EYES AND HAD NOT SAID ANYTHING. IT TOOK ME 
 
TOUCHING THE PATIENT'S ARM TO GET HER TO OPEN HER EYES AND ACKNOWLEDGE MY 
PRESENCE AND ANSWER MY QUESTIONS. THE PATIENT IS WITH IT AND ABLE TO ANSWER 
QUESTIONS. AT THIS POINT I ASKED HER IF SHE WANTED TO HAVE THE KYPHOPLASTY 
DONE. SHE STATED YES. THE LADY AT BEDSIDE BECAME VERY AGITATED STATING THAT 
SHE KNEW ABOUT HIPPA LAWS AND WANTED TO KNOW WHY ANYONE EVEN ALLOWED THINGS 
TO HAPPEN UP UNTIL NOW. NO ONE HAD ASKED FOR ANYONE'S DRIVERS LICENES TO 
VERIFY THEY WERE WHO THEY SAID THEY WERE. I TRIED TO EXPLAINE THAT EVEN WITH 
THAT, WE HAD NO WAY TO ACTUALLY VERIFY THAT WITH A DRIVERS LICENSE BECAUSE 
ANYONE CAN SAY THEY ARE SOMEONES FAMILY AND WE DON'T KNOW OTHERWISE UNLESS 
THE PATIENT SAYS SO, BUT SHE CUT ME OFF AS I WAS TALKING. I STOOD AT BEDSIDE 
AND LISTENED TO HER RANT ABOUT ALL THE THINGS THAT SHE HAS WITNESSED HERE AT 
THIS HOSPITAL THAT HAS MADE HER SICK TO HER STOMACH ESPECIALLY ABOUT THE FACT 
THAT SHE CAME IN HERE WITH A HURT BACK AND NOW THEY ARE JUST WATCHING HER DIE.
 I APOLOGIZED TO HER ABOUT ALL OF THE THINGS THAT [facility] WAS UPSET ABOUT, AND 
THEN DIRECTED MY CONVERSATION TO THE PATIENT, EXPLAINING THAT IF SHE WANTED 
THEY KYPHOPLASTY AND HER URINE WAS CLEAR THAT I WOULD EXPLAIN THIS TO THE 
NURSE PRACTITIONER. THAT ULTIMATELY I AM HERE TO TAKE CARE OF HER AND MAKE 
SURE WE ARE DOING WHAT SHE WANTS. I ALSO EXPLAINED TO HER THAT SHE NEEDS TO 
SIGN ALL OF HER OWN PAPERWORK, EVEN IF IT IS JUST WITH AN "X". THAT WE SIGN 
AS WITNESSES TO STATE WE KNOW SHE UNDERSTANDS AND SHE IS SIGNING. EXPLAINED 
THAT AS LONG AS SHE IS ABLE TO MAKE ALL HER OWN DECISIONS, WE DID NOT NEED 
HER POA PAPERWORK. SHE IS IN AGREEMENT. THE GREAT NEPHEWS WIFE WHO IS AT 
BEDSIDE CONTINUED WITH HER RANT, I AGAIN APOLOGIZED AND THE BEDSIDE NURSE AND 
I LEFT THE ROOM. I EXPLAINED TO SCOTTIE THAT THE PATIENT WAS CURRENTLY IN HER 
RIGHT MIND AND SHE IS WANTING THEY KYPHOPLASTY, AND SHE RECONSULTED IR TO SEE 
THE PATIENT.
DCP- Discharge Planning
 
Updated by [id number]: Melany Olivares on 19   4:25 pm CT
CM RECEIVED AN ORDER REGARDING PLANS TO DISCHARGE TO HOME W/ HOSPITAL BED , 
TUBE FEEDING AND HOME HEALTH. PATIENT'S NUTRITION NOTE IS 19. WILL NEED 
NUTRITION NOTE REGARDING PATIENT NEEDS FOR DISCHARGE. CM UNDERSTANDS THE 
PATIENT DOES EAT AND TAKE HER MEDICATIONS BY MOUTH. SHE IS PRESENTLY ON 
FEEDINGS VIA  PUMP. SHE HAS BEEN ADVANCED TO 60 CC/HR TODAY WHICH IS HER GOAL 
RATE.  
SPOKE WITH PRIMARY ELIUD AND SHE REPORTS NO RESIDUAL AT THIS TIME.  
WILL NEED TO HAVE [facility] COMPANY PROVIDE BED AND SPECIALTY MATTRESS  FOR HOME 
AND   
DETERMINE COVERAGE FOR TUBE FEEDING AND EQUIPMENT.  
CM TO FOLLOW UP IN AM.
DCP- Discharge Planning
 
Updated by [id number]: Albaro Chambers on 19   7:14 am CT
Patient Name:  CHANTAL TIPTON   
Encounter No:  [id number]   
:  1931   
Primary Insurance:  HU[employer] CHOICE PPO MCR ADVANT  
Anticipated DC Date: 2019   
Planned Disposition:  Skilled Nursing Facility  
External Planned Provider: [facility], MEDICARE REHAB BED 
  
  
 
DCP follow-up note: CM FAXED UPDATE TO [facility], 
[phone] FOR REHAB REQUEST.  
  
CM WAITING ADMISSION DETERMINATION FROM [facility] FOR 
REHAB PLACEMENT.  
  
ALBARO CHAMBERS, CASE MANAGEMENT
DCP- Discharge Planning
 
Updated by [id number]: Albaro Chambers on 19   3:46 pm CT
Patient Name:  CHANTAL TIPTON   
Encounter No:  [id number]   
:  1931   
Primary Insurance:  HUMANA CHOICE PPO MCR ADVANT  
Anticipated DC Date: 2019   
Planned Disposition:  Skilled Nursing Facility  
External Planned Provider:  [facility], MEDICARE REHAB 
BED  
  
  
DCP follow-up note: CM MET WITH PT'S TIANNA MCKEON BY AMARI MYLES, 
[phone].  AMARI STATES THAT JULIANNE BERMUDEZ IS PT'S FIANCE.  PT HAS POWER 
OF  WITH GREAT NEPHEW, GINGERS SPOUSE, MAGED BERMUDEZ, WHO WORKS IN 
[employer].  AMARI WILL GET MAGED TO HOSPITAL ALONG WITH POWER OF  
PAPERWORK AS SOON AS POSSIBLE.  THEY ARE IN AGREEMENT WITH REHAB AT [facility], BUT [facility] MAY NOT ACCEPT AS PT HAS CONTINUED DECLINE.  [facility] TO COME TOMORROW TO EVALUATE PT FOR REHAB.  IF [facility] DOES 
NOT ACCEPT FOR REHAB, THEY ARE THINKING TO TAKE PT HOME TO [facility] HERE 
IN [place] AND AMARI ALONG WITH FAMILY WILL CARE FOR PT WITH HOME 
HOSPICE.  AMARI REPORTS THEY WILL MAKE THAT DECISION TOMORROW.  CM EXPLAINED 
THAT THE POWER OF  WILL HAVE TO BE IN AGREEMENT AND SIGN ANY NEEDED 
AUTHORIZATIONS FOR ENROLLMENT IN REHAB OR HOSPICE CARE.  AMARI REPORTS 
UNDERSTANDING, PROVIDED CELL PHONE NUMBER FOR MAGED BERMUDEZ, [phone].  
  
CM WAITING ADMISSION DETERMINATION FROM [facility] FOR 
REHAB PLACEMENT.  
  
ALBARO CHAMBERS, CASE MANAGEMENT
 
DCP- Discharge Planning
 
Updated by [id number]: Albaro Chambers on 19   3:34 pm CT
Patient Name:  CHANTAL TIPTON   
Encounter No:  [id number]   
:  1931   
Primary Insurance:  HU[employer]NA CHOICE PPO MCR ADVANT  
Anticipated DC Date: 2019   
Planned Disposition:  Skilled Nursing Facility  
External Planned Provider: [facility], MEDICARE REHAB BED 
  
  
DCP follow-up note: CM MET WITH PT AND SON/POA, JULIANNE BERMUDEZ, AT FAMILY 
 
REQUEST.  MR. BERMUDEZ REPORTS IT IS NOW TIME TO SEND REFERRAL TO [facility] 
FOR REHAB PLACEMENT.  IMPORTANT MESSAGE FROM MEDICARE PROVIDED AND EXPLAINED. 
  
[phone].   
  
CM WAITING ADMISSION DETERMINATION FROM [facility] FOR 
REHAB PLACEMENT.  
  
ALBARO CHAMBERS, CASE MANAGEMENT
DCP- Discharge Planning
 
Updated by [id number]: Albaro Chambers on 19   4:52 pm CT
Patient Name: CHANTAL TIPTON                                     
Admission Status: Elective   
Accout number: [id number]                              
Admission Date: 2019   
: 1931                                                        
Admission Diagnosis:ACUTE KIDNEY FAILURE, UNSPECIFIED   
Attending: SHARATH TORRES                                                
Current LOS:  2   
  
Anticipated DC Date:    
Planned Disposition: Skilled Nursing Facility   
Primary Insurance: HU[employer]NA CHOICE PPO MCR ADVANT   
PLANNED EXTERNAL PROVIDER:  [facility], MEDICARE REHAB 
BED  
  
Discharge Planning Comments:   
CM RECEIVED ORDER INDICATING PT WANTS NURSING HOME CARE.  CM MET WITH PT AND 
SON IN ROOM TO DISCUSS DISCHARGE PLANNING AND NEEDS. PT REPORTS THAT BEFORE 
GETTING SICK, SHE WAS LIVING AT HOME WITH HER SON, INDEPENDENT IN HER CARE.  
PT REPORTS SHE DID TELL THE DOCTOR THAT SHE WOULD BE BETTER OFF IN A NURSING 
HOME BEFORE FAMILY ARRIVED TODAY.  PT WANTS REHAB, NOT LONG TERM CARE.   PT 
HAS NO MEDICAL EQUIPMENT AND NO OUTSIDE SERVICES ASSISTING IN THE HOME. CM 
DISCUSSED AVAILABILITY OF HOME HEALTH, REHAB SERVICES AND MEDICAL EQUIPMENT. 
PT'S SON INITIALLY DECLINED TO PARTICIPATE IN DISCHARGE PLANNING REPORTING PT 
WAS SENT TO REHAB DOWNSTAIRS TOO SOON WHEN SHE WAS NOT ABLE TO WALK OR EVEN 
FEED HERSELF. SON WANTS PT CONSIDERED FOR INPATIENT REHAB AGAIN STATING THEY 
SENT HER BACK HERE BECAUSE SHE USED ALL OF HER DAYS DOWN THERE.  PT DOES NOT 
THINK SHE CAN PARTICIPATE IN THREE HOURS OF PROGRESSIVE THERAPY PER DAY.  CM 
EXPLAINED THAT IF PT IS NOT ABLE TO PARTICIPATE IN THE THREE HOURS OF 
PROGRESSIVE THERAPY, IS NOT ABLE TO STAND OR FEED HERSELF, SHE IS NOT GO[facility] 
[facility]  CM DISCUSSED 
AVAILABILITY OF SKILLED NURSING REHAB SERVICES AND PROVIDED CHOICE FORM.  
PT'S SON REPORTS HE ALREADY SIGNED ONE OF THESE FOR [facility] THE LAST 
VISIT AND SPEAKING WITH THIS CM.  CM EXPLAINED A NEW ONE WAS NEEDED.  PT'S 
SON SIGNED THE FORM AND DOES NOT WANT PT SENT TO REHAB BEFORE SHE IS STABLE.  
CM EXPLAINED THAT ALL DOCTORS WOULD HAVE TO INDICATE PT IS READY TO DISCHARGE 
AND CM IS ONLY WANTING TO BE PROACTIVE ON PT'S BEHALF FOR DISCHARGE PLANNING 
AND SECURE REHAB BED AHEAD OF TIME TO ENSURE THAT PT IS ABLE TO GET INTO [facility] WHEN DISCHARGED AS [facility] IS POPULAR REHAB AND DOES FILL UP.  
PT'S SON WILL WANTS PT TO BE STABLE FOR DISCHARGE FOR CM TO SEND REFERRALS.  
 
CHOICE SIGNED. CM PROVIDED PT'S SON WITH CM CONTACT INFORMATION.  
  
CM TO SEND REFERRAL TO [facility] WHEN PROJECTED 
DISCHARGE DATE IS KNOWN, AS PT'S SON DOES NOT WANT REHAB REFERRAL SENT OUT 
PRIOR TO PT'S MEDICAL STABILITY FOR DISCHARGE TO REHAB.  
  
: Albaro Chambers
 
 DCPIA - Discharge Planning Initial Assessment
 
Updated by [id number]: Albaro Chambers on 19   1:21 pm
*  Is the patient Alert and Oriented?
Yes
*  How many steps to enter\exit or inside your home? NONE *  PCP DR. MCCALL *  Pharmacy
[facility]
*  Preadmission Environment
Acute Inpatient Rehab
*  Facility Name
[facility]
*  ADLs
Total Dependent
*  Equipment
None
*  Other Equipment
NO MEDICAL EQUIPMENT PROVIDER PREFERECE
*  List name and contact numbers for known caregivers / representatives who 
currently or will assist patient after discharge:
ELIAS KUNZ [phone]
*  Verbal permission to speak to the caregivers and representatives has been 
obtained from the patient.
Yes
*  Community resources currently utilized
None
*  Please name any agencies selected above.
NONE
*  Additional services required to return to the preadmission environment?
Yes
*  Can the patient safely return to the preadmission environment?
Yes
*  Has this patient been hospitalized within the prior 30 days at any 
hospital?
Yes
 
 
 
 
 
Coverage Notice
 
Reviewer: WILLIAN Chambers
 
Notice Issued Date-Time: 2019  13:00
 
Notice Type: Patient Choice Letter
 
Notice Delivered To: Family Member
Relationship to Patient: Other Relationship
Representative Name: JULIANNE BERMUDEZ
 
Delivery Method: HAND - Hand Delivered
Ariadne Days:
Prior Verbal Notification: 
 
Recipient Understood Notice: Yes
Recipient Signature: Yes
Med Rec Note Co-signed by Attending:
 
Coverage Notice Comment:  [facility]
Reviewer: WILLIAN Chambers
 
Notice Issued Date-Time: 2019  12:20
Notice Type: IM Discharge Notice
 
Notice Delivered To: Family Member
Relationship to Patient: Other Relationship
Representative Name: JULIANNE BERMUDEZ
 
Delivery Method: HAND - Hand Delivered
Ariadne Days:
Prior Verbal Notification: 
 
Recipient Understood Notice: Yes
Recipient Signature: Yes
Med Rec Note Co-signed by Attending:
 
Coverage Notice Comment:  
Reviewer: WILLIAN Chambers
 
Notice Issued Date-Time: 2019  16:20
Notice Type: IM Discharge Notice
 
Notice Delivered To: Family Member
Relationship to Patient: Other Relationship
Representative Name: AMARI BERMUDEZ
 
Delivery Method: HAND - Hand Delivered
Ariadne Days:
Prior Verbal Notification: 
 
Recipient Understood Notice: Yes
Recipient Signature: Yes
Med Rec Note Co-signed by Attending:
 
Coverage Notice Comment:  
Reviewer: WILLIAN Chambers
 
 
Notice Issued Date-Time: 2019  16:20
Notice Type: Patient Choice Letter
 
Notice Delivered To: Family Member
Relationship to Patient: Other Relationship
Representative Name: AMARI BERMUDEZ
 
Delivery Method: HAND - Hand Delivered
Ariadne Days:
Prior Verbal Notification: 
 
Recipient Understood Notice: Yes
Recipient Signature: Yes
Med Rec Note Co-signed by Attending:
 
Coverage Notice Comment:  [facility]
 
Last DP export: 19   9:01 a
Patient Name: CHANTAL TIPTON
Encounter #: [id number]
Page [id number]
 
 
 
 
 
Electronically Signed by CHELE RAMON on 19 at 1124
 
 
 
 
 
 
**All edits/amendments must be made on the electronic document**
 
DICTATION DATE: 19     : KARY  19     
RPT#: [id number]                                DC DATE:        
                                               STATUS: ADM IN  
[facility]
 [place] [address]
***END OF REPORT***

## 2019-01-22 NOTE — NUR
PT C/O HER BACK STILL HURTING AND SHE IS WANTING BACK IN BED. FAMILY AT
BEDSIDE INSISTING SHE STAYS UP. CALLED THERAPY AND THEY WILL COME AFTER LUNCH.
PROVIDED PT WITH PRN TYLENOL AS REQUESTED. NO IMMEDIATE NEEDS AT THIS TIME. CL
IN REACH. WILL CTM.

## 2019-01-22 NOTE — NUR
IR STAFF CAME AND DISCUSSED PLANNING AND PT WILL BE GOING FOR KYPHOPLASTY
TOMORROW. NO FAMILY PRESENT, WILL DISCUSS LATER.

## 2019-01-22 NOTE — NUR
EMPTIED ALEXANDER CATHETER OF 925CC CLOUDY YELLOW URINE. PT IS NOT WANTING TO EAT
DINNER AND HAS JUST BEEN VERY TIRED AND SLEEPY ALL DAY. PT IS NORMALLY MORE
 THAN THIS HOWEVER JUST HAS SEEMED TO HAVE A BAD DAY TODAY. UPON GOING
TO PERFORM ALEXANDER CARE NOTED PT IS INCONTINENT OF DIARRHEA, CNA ZEE AT BEDSIDE
TO PERFORM BED CHANGED AND ALEXANDER CARE. NO FAMILY PRESENT. NO CURRENT NEEDS.
WILL CTM.

## 2019-01-22 NOTE — NUR
PT RESTING QUIETLY IN BED AND STATES SHE IS FEELING A LITTLE BETTER BUT WANTS
TO REST. CL IN REACH, BED IN LOWEST, SIDE RAILS X2 AND NO FAMILY PRESENT WILL
CTM.

## 2019-01-22 NOTE — MOR[facility]ARE
CASE MANAGEMENT DISCHARGE SUMMARY
 
 
PATIENT: CHANTAL TIPTON                        UNIT: [id number]
ACCOUNT#: [id number]                       ADM DATE: 19
AGE: 88     : [birth year]  SEX: F            ROOM/BED: D.2112    
AUTHOR: [email or web address]                             PHYSICIAN:                               
 
REFERRING PHYSICIAN: SHARATH TORRES MD               
DATE OF SERVICE: 19
Discharge Plan
 
 
Patient Name: CHANTAL TIPTON
Facility: [facility]:[place]
Encounter #: [id number]
Medical Record #: [id number]
: 1931
Planned Disposition: Skilled Nursing Facility
Anticipated Discharge Date: 19
 
Discharge Date: 
Expected LOS: 21
Initial Reviewer: [id number]
Initial Review Date: 2019
Generated: 19  11:01 am 
Comments
 
DCP- Discharge Planning
 
Updated by [id number]: Albaro Chambers on 19   4:27 pm CT
Patient Name:  CHANTAL TIPTON   
Encounter No:  [id number]   
:  1931   
Primary Insurance:  HUMANA CHOICE PPO MCR ADVANT  
Anticipated DC Date: 2019   
Planned Disposition:  HOME WITH HOME HEALTH  
External Planned Provider: [facility]  
  
  
DCP follow-up note: CM RECEIVED ORDER FOR HOME HEALTH, TUBE FEEDING AND 
HOSPITAL BED.  CM MET WITH PT AND GRAND NEPHEW'S SPOUSE IN ROOM.  CM BEGAN 
DISCUSSING ORDER, PT'S GRAND NEPHEW'S SPOUSE INFORMED CM THAT PT PLANS TO 
DISCHARGE TO [facility] FOR REHAB AND ASKED IF CM HAS HEARD FROM [facility] AS SHE DID NOT SEE THEM FRIDAY AS THEY WERE SUPPOSED TO EVALUATE PT 
THEN.  CM CALLED [facility], SPOKE TO LATONYA WHO WILL 
CHECK WITH MIRANDA TO FIND OUT WHAT HAS HAPPENED.  LATONYA WILL HAVE MIRANDA CALL 
CM AS SOON AS POSSIBLE.    
CM MET WITH PT, PT'S ELIAS AND PT'S GRAND NEPHEW'S NIECE.  PT'S LEIAS 
INFORMED CM THAT CM DOES NOT NEED TO SEE THEM AND THAT DR. LEPE HAS 
 
TAKEN CARE OF EVERYTHING. CM ASKED WHAT HAD BEEN WORKED OUT.  CM WAS ADVISED 
THAT PT WILL HAVE THE KYPHOPLASTY ON WEDNESDAY AND THEN GO TO [facility] 
FOR REHAB, DR. LEPE WILL TALK TO MIRANDA AT [facility] AND WORK THIS 
OUT.  CM NOTIFIED LIZZY EMANUEL AND DR. LEPE.    
  
CM RECEIVED CALL BACK FROM MIRANDA WHO SPOKE TO FAMILY, INFORMED THEM THAT PT 
IS NOT APPROPRIATE CANDIDATE FOR REHAB AS SHE IS REFUSING THERAPY SERICES AND 
 INFORMED CM THAT FAMILY HAS REFUSED LONG TERM CARE PLACEMENT AT [facility].    
  
CM MET WITH PT AND PT'S GRAND NEPHEWS SPOUSE IN ROOM.  WHITNEY DISCUSSED IMPORTANT 
MESSAGE FROM MEDICARE AND DISCUSSED HOW TO COMPLAIN IF NEEDED TO HOUSE 
SUPERVISOR, ADMINISTRATION AND IF NEEDED, QUALITY IMPROVEMENT OFFICE FOR 
MEDICARE.  AMARI INFORMED CM THAT SHE HAS COMPLAINED TO THE DOCTORS BUT HAS 
BEEN THINKING SHE NEEDS TO COMPLAIN TO SOMEONE ELSE. CM DISCUSSED DISCHARGE 
PLAN.  AMARI HAS TALKED TO PT, PT'S ELIAS AND MIRANDA AT [facility].  THEY 
ARE NOT GOING TO PUT PT INTO LONG TERM CARE AND WILL BE TAKING PT HOME AS 
DISCUSSED LAST WEEK; AMARI STATES PT IS NOT READY FOR HOSPICE BUT WANTS HOME 
HEALTH.  HOME HEALTH LISTING PROVIDED AND DISCUSSED, AMARI WANTS TO USE THE 
HIGHEST RATED COMPANY, PT TOLD AMARI TO SIGN THE FORMS.  AMARI SIGNED THE 
IMPORTANT MESSAGE FROM MEDICARE AND CHOICE FOR [employer] [facility].  AMARI 
STATES THAT THEY WILL NEED A HOSPITAL BED, BEDSIDE COMMODE, OXYGEN AND TUBE 
FEEDING SET UP FOR PT AT HOME. WHITNEY EXPLAINED THAT PT'S INSURANCE HAS TO PRE 
AUTHORIZE ALL MEDICAL EQUIPMENT AND THAT SINCE PT IS ABLE TO EAT, INSURANCE 
MAY NOT COVER TUBE FEEDING.  THEY HAVE NO PREFERENCE ON PROVIDER AND AMARI 
ASKED TO BE CONTACTED REGARDING ANY COPAYS FOR EQUIPMENT -[phone].  
  
CM HAS ORDER FOR HOME HEALTH, HOSPITAL BED AND TUBE FEEDING.  CM WILL NEED 
ADDITIONAL ORDERS FOR BEDSIDE COMMODE AND OXYGEN TESTING.    
  
CM TO FIND IN NETWORK PROVIDER FOR PT'S INSURANCE TO ARRANGE NEEDED MEDICAL 
EQUIPMENT AS SOON AS POSSIBLE.  CM TO COMPLETE HOME HEALTH ARRANGEMENTS WITH 
[employer].  
  
Albaro Chambers
DCP- Discharge Planning
 
Updated by [id number]: Marli Vickers on 19  11:53 am CT
RECEIVED NOTICE THAT THE PATIENTS FAMILY WAS WANTING HER TO HAVE THE 
KYPHOPLASTY BEFORE SHE LEAVES THE HOSPITAL. IT WAS MENTIONED THAT THE 
PATIENTS POA HAS NOT BEEN HER AND THERE IS NO POA PAPERWORK IN THE CHART. I 
ASKED THE BEDSIDE NURSE PENG ENCARNACION TO COME INTO THE ROOM WITH ME TO DISCUSS 
THE NEED FOR POA PAPERWORK IN ORDER TO HAVE ANY CONSENTS FOR PROCEDURES 
SIGNED SINCE THE PATIENT WAS NOT ABLE TO SIGN HERSELF, AND BY THE HIERACHY OF 
LAW, THE POA OR NEXT OF KIN WOULD BE WHO NEEDED TO SIGN AND SINCE THEY (THE 
LADY AND ELIAS) WERE NOT BLOOD RELATED, THEY COULD NOT SIGN FOR HER AS THEY 
HAVE BEEN. AT THIS TIME I WAS QUICKLY TOLD THAT SHE WAS IN HER RIGHT MIND AND 
ABLE TO MAKE ALL HER OWN DECISIONS. THE LADY AT BEDSIDE PROCEEDED TO TELL ME 
ABOUT ALL THE TIMES PEOPLE CAME IN AND QUESTIONED HER ABOUT THE DATE, TIME, 
PRESIDENT, ETC AND SHE WAS ABLE TO ALWAYS ANSWER ALL THEIR QUESTIONS. SHE 
STATED THE PATIENT WAS ABLE TO SIGN FOR HERSELF. UP TO THIS POINT, THE 
PATIENT HAD NOT OPENED HER EYES AND HAD NOT SAID ANYTHING. IT TOOK ME 
 
TOUCHING THE PATIENT'S ARM TO GET HER TO OPEN HER EYES AND ACKNOWLEDGE MY 
PRESENCE AND ANSWER MY QUESTIONS. THE PATIENT IS WITH IT AND ABLE TO ANSWER 
QUESTIONS. AT THIS POINT I ASKED HER IF SHE WANTED TO HAVE THE KYPHOPLASTY 
DONE. SHE STATED YES. THE LADY AT BEDSIDE BECAME VERY AGITATED STATING THAT 
SHE KNEW ABOUT HIPPA LAWS AND WANTED TO KNOW WHY ANYONE EVEN ALLOWED THINGS 
TO HAPPEN UP UNTIL NOW. NO ONE HAD ASKED FOR ANYONE'S DRIVERS LICENES TO 
VERIFY THEY WERE WHO THEY SAID THEY WERE. I TRIED TO EXPLAINE THAT EVEN WITH 
THAT, WE HAD NO WAY TO ACTUALLY VERIFY THAT WITH A DRIVERS LICENSE BECAUSE 
ANYONE CAN SAY THEY ARE SOMEONES FAMILY AND WE DON'T KNOW OTHERWISE UNLESS 
THE PATIENT SAYS SO, BUT SHE CUT ME OFF AS I WAS TALKING. I STOOD AT BEDSIDE 
AND LISTENED TO HER RANT ABOUT ALL THE THINGS THAT SHE HAS WITNESSED HERE AT 
THIS HOSPITAL THAT HAS MADE HER SICK TO HER STOMACH ESPECIALLY ABOUT THE FACT 
THAT SHE CAME IN HERE WITH A HURT BACK AND NOW THEY ARE JUST WATCHING HER DIE.
 I APOLOGIZED TO HER ABOUT ALL OF THE THINGS THAT [facility] WAS UPSET ABOUT, AND 
THEN DIRECTED MY CONVERSATION TO THE PATIENT, EXPLAINING THAT IF SHE WANTED 
THEY KYPHOPLASTY AND HER URINE WAS CLEAR THAT I WOULD EXPLAIN THIS TO THE 
NURSE PRACTITIONER. THAT ULTIMATELY I AM HERE TO TAKE CARE OF HER AND MAKE 
SURE WE ARE DOING WHAT SHE WANTS. I ALSO EXPLAINED TO HER THAT SHE NEEDS TO 
SIGN ALL OF HER OWN PAPERWORK, EVEN IF IT IS JUST WITH AN "X". THAT WE SIGN 
AS WITNESSES TO STATE WE KNOW SHE UNDERSTANDS AND SHE IS SIGNING. EXPLAINED 
THAT AS LONG AS SHE IS ABLE TO MAKE ALL HER OWN DECISIONS, WE DID NOT NEED 
HER POA PAPERWORK. SHE IS IN AGREEMENT. THE GREAT NEPHEWS WIFE WHO IS AT 
BEDSIDE CONTINUED WITH HER RANT, I AGAIN APOLOGIZED AND THE BEDSIDE NURSE AND 
I LEFT THE ROOM. I EXPLAINED TO SCOTTIE THAT THE PATIENT WAS CURRENTLY IN HER 
RIGHT MIND AND SHE IS WANTING THEY KYPHOPLASTY, AND SHE RECONSULTED IR TO SEE 
THE PATIENT.
DCP- Discharge Planning
 
Updated by [id number]: Melany Olivares on 19   4:25 pm CT
CM RECEIVED AN ORDER REGARDING PLANS TO DISCHARGE TO HOME W/ HOSPITAL BED , 
TUBE FEEDING AND HOME HEALTH. PATIENT'S NUTRITION NOTE IS 19. WILL NEED 
NUTRITION NOTE REGARDING PATIENT NEEDS FOR DISCHARGE. CM UNDERSTANDS THE 
PATIENT DOES EAT AND TAKE HER MEDICATIONS BY MOUTH. SHE IS PRESENTLY ON 
FEEDINGS VIA  PUMP. SHE HAS BEEN ADVANCED TO 60 CC/HR TODAY WHICH IS HER GOAL 
RATE.  
SPOKE WITH PRIMARY ELIUD AND SHE REPORTS NO RESIDUAL AT THIS TIME.  
WILL NEED TO HAVE [facility] PROVIDE BED AND SPECIALTY MATTRESS  FOR HOME 
AND   
DETERMINE COVERAGE FOR TUBE FEEDING AND EQUIPMENT.  
CM TO FOLLOW UP IN AM.
DCP- Discharge Planning
 
Updated by [id number]: Albaro Chambers on 19   7:14 am CT
Patient Name:  CHANTAL TIPTON   
Encounter No:  [id number]   
:  1931   
Primary Insurance:  HUMANA CHOICE PPO [facility] ADVANT  
Anticipated DC Date: 2019   
Planned Disposition:  Skilled Nursing Facility  
External Planned Provider: [facility], MEDICARE REHAB BED 
  
  
 
DCP follow-up note: CM FAXED UPDATE TO [facility], 
[phone] FOR REHAB REQUEST.  
  
CM WAITING ADMISSION DETERMINATION FROM [facility] FOR 
REHAB PLACEMENT.  
  
ALBARO CHAMBERS, CASE MANAGEMENT
DCP- Discharge Planning
 
Updated by [id number]: Albaro Chambers on 19   3:46 pm CT
Patient Name:  CHANTAL TIPTON   
Encounter No:  [id number]   
:  1931   
Primary Insurance:  HUMANA CHOICE PPO [employer]CR ADVANT  
Anticipated DC Date: 2019   
Planned Disposition:  Skilled Nursing Facility  
External Planned Provider:  [facility], MEDICARE REHAB 
BED  
  
  
DCP follow-up note: CM MET WITH PT'S TIANNA MCKEON BY AMARI MYLES, 
[phone].  AMARI STATES THAT JULIANNE BERMUDEZ IS PT'S FIANCE.  PT HAS POWER 
OF  WITH GREAT NEPHEW, GINGERS SPOUSE, MAGED BERMUDEZ, WHO WORKS IN 
I and love and you.  AMARI WILL GET MAGED TO HOSPITAL ALONG WITH POWER OF  
PAPERWORK AS SOON AS POSSIBLE.  THEY ARE IN AGREEMENT WITH REHAB AT [facility], BUT [facility] MAY NOT ACCEPT AS PT HAS CONTINUED DECLINE.  [facility] TO COME TOMORROW TO EVALUATE PT FOR REHAB.  IF [facility] DOES 
NOT ACCEPT FOR REHAB, THEY ARE THINKING TO TAKE PT HOME TO [facility] HERE 
IN [place] AND AMARI ALONG WITH FAMILY WILL CARE FOR PT WITH HOME 
HOSPICE.  AMARI REPORTS THEY WILL MAKE THAT DECISION TOMORROW.  CM EXPLAINED 
THAT THE POWER OF  WILL HAVE TO BE IN AGREEMENT AND SIGN ANY NEEDED 
AUTHORIZATIONS FOR ENROLLMENT IN REHAB OR HOSPICE CARE.  AMARI REPORTS 
UNDERSTANDING, PROVIDED CELL PHONE NUMBER FOR MAGED BERMUDEZ, [phone].  
  
CM WAITING ADMISSION DETERMINATION FROM [facility] FOR 
REHAB PLACEMENT.  
  
ALBARO CHAMBERS, CASE MANAGEMENT
 
DCP- Discharge Planning
 
Updated by [id number]: Albaro Chambers on 19   3:34 pm CT
Patient Name:  CHANTAL TIPTON   
Encounter No:  [id number]   
:  1931   
Primary Insurance:  HUMANA CHOICE PPO MCR ADVANT  
Anticipated DC Date: 2019   
Planned Disposition:  Skilled Nursing Facility  
External Planned Provider: [facility], MEDICARE REHAB BED 
  
  
DCP follow-up note: CM MET WITH PT AND SON/POA, JULIANNE BERMUDEZ, AT FAMILY 
 
REQUEST.  MR. BERMUDEZ REPORTS IT IS NOW TIME TO SEND REFERRAL TO [facility] 
FOR REHAB PLACEMENT.  IMPORTANT MESSAGE FROM MEDICARE PROVIDED AND EXPLAINED. 
  
[phone].   
  
CM WAITING ADMISSION DETERMINATION FROM [facility] FOR 
REHAB PLACEMENT.  
  
ALBARO CHAMBERS, CASE MANAGEMENT
DCP- Discharge Planning
 
Updated by [id number]: Albaro Chambers on 19   4:52 pm CT
Patient Name: CHANTAL TIPTON                                     
Admission Status: Elective   
Accout number: [id number]                              
Admission Date: 2019   
: 1931                                                        
Admission Diagnosis:ACUTE KIDNEY FAILURE, UNSPECIFIED   
Attending: SHARATH TORRES                                                
Current LOS:  2   
  
Anticipated DC Date:    
Planned Disposition: Skilled Nursing Facility   
Primary Insurance: HUMANA CHOICE PPO MCR ADVANT   
PLANNED EXTERNAL PROVIDER:  [facility], MEDICARE REHAB 
BED  
  
Discharge Planning Comments:   
CM RECEIVED ORDER INDICATING PT WANTS NURSING HOME CARE.  CM MET WITH PT AND 
SON IN ROOM TO DISCUSS DISCHARGE PLANNING AND NEEDS. PT REPORTS THAT BEFORE 
GETTING SICK, SHE WAS LIVING AT HOME WITH HER SON, INDEPENDENT IN HER CARE.  
PT REPORTS SHE DID TELL THE DOCTOR THAT SHE WOULD BE BETTER OFF IN A NURSING 
HOME BEFORE FAMILY ARRIVED TODAY.  PT WANTS REHAB, NOT LONG TERM CARE.   PT 
HAS NO MEDICAL EQUIPMENT AND NO OUTSIDE SERVICES ASSISTING IN THE HOME. CM 
DISCUSSED AVAILABILITY OF HOME HEALTH, REHAB SERVICES AND MEDICAL EQUIPMENT. 
PT'S SON INITIALLY DECLINED TO PARTICIPATE IN DISCHARGE PLANNING REPORTING PT 
WAS SENT TO REHAB DOWNSTAIRS TOO SOON WHEN SHE WAS NOT ABLE TO WALK OR EVEN 
FEED HERSELF. SON WANTS PT CONSIDERED FOR INPATIENT REHAB AGAIN STATING THEY 
SENT HER BACK HERE BECAUSE SHE USED ALL OF HER DAYS DOWN THERE.  PT DOES NOT 
THINK SHE CAN PARTICIPATE IN THREE HOURS OF PROGRESSIVE THERAPY PER DAY.  CM 
EXPLAINED THAT IF PT IS NOT ABLE TO PARTICIPATE IN THE THREE HOURS OF 
PROGRESSIVE THERAPY, IS NOT ABLE TO STAND OR FEED HERSELF, SHE IS NOT GO[facility] 
[facility].  CM DISCUSSED 
AVAILABILITY OF SKILLED NURSING REHAB SERVICES AND PROVIDED CHOICE FORM.  
PT'S SON REPORTS HE ALREADY SIGNED ONE OF THESE FOR [facility] THE LAST 
VISIT AND SPEAKING WITH THIS CM.  CM EXPLAINED A NEW ONE WAS NEEDED.  PT'S 
SON SIGNED THE FORM AND DOES NOT WANT PT SENT TO REHAB BEFORE SHE IS STABLE.  
CM EXPLAINED THAT ALL DOCTORS WOULD HAVE TO INDICATE PT IS READY TO DISCHARGE 
AND CM IS ONLY WANTING TO BE PROACTIVE ON PT'S BEHALF FOR DISCHARGE PLANNING 
AND SECURE REHAB BED AHEAD OF TIME TO ENSURE THAT PT IS ABLE TO GET INTO [facility] WHEN DISCHARGED AS [facility] IS POPULAR REHAB AND DOES FILL UP.  
PT'S SON WILL WANTS PT TO BE STABLE FOR DISCHARGE FOR CM TO SEND REFERRALS.  
 
CHOICE SIGNED. CM PROVIDED PT'S SON WITH CM CONTACT INFORMATION.  
  
CM TO SEND REFERRAL TO [facility] WHEN PROJECTED 
DISCHARGE DATE IS KNOWN, AS PT'S SON DOES NOT WANT REHAB REFERRAL SENT OUT 
PRIOR TO PT'S MEDICAL STABILITY FOR DISCHARGE TO REHAB.  
  
: Albaro Chambers
 
 DCPIA - Discharge Planning Initial Assessment
 
Updated by [id number]: Albaro Chambers on 19   1:21 pm
*  Is the patient Alert and Oriented?
Yes
*  How many steps to enter\exit or inside your home? NONE *  PCP DR. MCCALL *  Pharmacy
[facility]
*  Preadmission Environment
Acute Inpatient Rehab
*  Facility Name
[facility] IN[facility]
*  ADLs
Total Dependent
*  Equipment
None
*  Other Equipment
NO MEDICAL EQUIPMENT PROVIDER PREFERECE
*  List name and contact numbers for known caregivers / representatives who 
currently or will assist patient after discharge:
ELIAS KUNZ [phone]
*  Verbal permission to speak to the caregivers and representatives has been 
obtained from the patient.
Yes
*  Community resources currently utilized
None
*  Please name any agencies selected above.
NONE
*  Additional services required to return to the preadmission environment?
Yes
*  Can the patient safely return to the preadmission environment?
Yes
*  Has this patient been hospitalized within the prior 30 days at any 
hospital?
Yes
 
External Providers
External Provider: [facility]
 
Next Contact Date: 2019
Service Request Date: 
Service Type: 
Resolution: 
 
Reviewer: 
 
Comments: 
External Provider: OTHER-OTHER
 
Next Contact Date: 2019
Service Request Date: 
Service Type: 
Resolution: 
 
Reviewer: 
Comments: 
External Provider: [facility]
 
Next Contact Date: 2019
Service Request Date: 
Service Type: 
Resolution: 
 
Reviewer: 
Comments: 
 
 
 
 
Coverage Notice
 
Reviewer: [id number] Chambers
 
Notice Issued Date-Time: 2019  13:00
Notice Type: Patient Choice Letter
 
Notice Delivered To: Family Member
Relationship to Patient: Other Relationship
Representative Name: JULIANNE BERMUDEZ
 
Delivery Method: HAND - Hand Delivered
Ariadne Days:
Prior Verbal Notification: 
 
Recipient Understood Notice: Yes
Recipient Signature: Yes
Med Rec Note Co-signed by Attending:
 
Coverage Notice Comment:  [facility]
Reviewer: [id number] Chambers
 
Notice Issued Date-Time: 2019  12:20
Notice Type: IM Discharge Notice
 
Notice Delivered To: Family Member
Relationship to Patient: Other Relationship
Representative Name: JULIANNE BERMUDEZ
 
 
Delivery Method: HAND - Hand Delivered
Ariadne Days:
Prior Verbal Notification: 
 
Recipient Understood Notice: Yes
Recipient Signature: Yes
Med Rec Note Co-signed by Attending:
 
Coverage Notice Comment:  
Reviewer: [id number] Chambers
 
Notice Issued Date-Time: 2019  16:20
Notice Type: IM Discharge Notice
 
Notice Delivered To: Family Member
Relationship to Patient: Other Relationship
Representative Name: AMARI BERMUDEZ
 
Delivery Method: HAND - Hand Delivered
Ariadne Days:
Prior Verbal Notification: 
 
Recipient Understood Notice: Yes
Recipient Signature: Yes
Med Rec Note Co-signed by Attending:
 
Coverage Notice Comment:  
Reviewer: [email or web address] - Albaro Chambers
 
Notice Issued Date-Time: 2019  16:20
Notice Type: Patient Choice Letter
 
Notice Delivered To: Family Member
Relationship to Patient: Other Relationship
Representative Name: AMARI BERMUDEZ
 
Delivery Method: HAND - Hand Delivered
Ariadne Days:
Prior Verbal Notification: 
 
Recipient Understood Notice: Yes
Recipient Signature: Yes
Med Rec Note Co-signed by Attending:
 
Coverage Notice Comment:  [facility]
 
Last DP export: 19   4:33 p
Patient Name: CHANTAL TIPTON
 
Encounter #: [id number]
Page [id number]
 
 
 
 
 
Electronically Signed by CHELE RAMON on 19 at 1001
 
 
 
 
 
 
**All edits/amendments must be made on the electronic document**
 
DICTATION DATE: 19 1001     : KARY  19 1001     
RPT#: [id number]                                DC DATE:        
                                               STATUS: ADM IN  
[facility]
[address] [place] [address]
***END OF REPORT***

## 2019-01-22 NOTE — MORECARE
CASE MANAGEMENT DISCHARGE SUMMARY
 
 
PATIENT: CHANTAL TIPTON                        UNIT: [id number]
ACCOUNT#: [id number]                       ADM DATE: 19
AGE: 88     : [birth year]  SEX: F            ROOM/BED: D.2116    
AUTHOR: YENNY,DOC                             PHYSICIAN:                               
 
REFERRING PHYSICIAN: SHARATH TORRES MD               
DATE OF SERVICE: 19
Discharge Plan
 
 
Patient Name: CHANTAL TIPTON
Facility: [facility]:[place]
Encounter #: [id number]
Medical Record #: [id number]
: 1931
Planned Disposition: Home with Home Health
Anticipated Discharge Date: 19
 
Discharge Date: 
Expected LOS: 21
Initial Reviewer: [id number]
Initial Review Date: 2019
Generated: 19  12:39 pm 
Comments
 
DCP- Discharge Planning
 
Updated by [id number]: Albaro Chambers on 19  10:35 am CT
Patient Name:  CHANTAL TIPTON   
Encounter No:  [id number]   
:  1931   
Primary Insurance:  [employer] CHOICE PPO MCR ADVANT  
Anticipated DC Date: 2019   
Planned Disposition:  Home with Home Health  
External Planned Provider: [facility]  
  
  
DCP follow-up note: CM CALLED DWAINE AT [facility], [phone], PROVIDED 
INFORMATION FOR HOSPITAL BED AND BEDSIDE COMMODE, INFORMED OF PLANNED 
DISCHARGE TOMORROW.  CM FAXED ORDER AND REFERRAL INFORMATION TO [facility] AT 
[phone].  [facility] TO ARRANGE DELIVERY OF MEDICAL EQUIPMENT WITH PT'S 
FAMILY.  
  
CM CALLED [facility], [phone], SPOKE TO MARGARET 
REGARDING TUBE FEEDING ORDER; MARGARET ADVISED THAT PT'S INSURANCE WILL NOT PAY 
FOR SUPPLEMENTAL NUTRITION AS PT IS ON REGULAR DIET AND ABLE TO EAT BY MOUTH. 
 MARGARET WILL REVIEW REFERRAL TO SEE IF THERE IS SOME WAY THAT INSURANCE MAY 
 
COVER THE SERVICE.  CM SPOKE TO PT IN ROOM, INFORMED OF ABOVE; PT ASKED FOR 
CM TO GET CASH PAY PRICE FOR TUBE FEEDING AND DISCUSS WITH GINGER, HER NIECE. 
 CM FAXED REFERRAL TO [facility] [phone].  
  
CM CALLED [facility], [phone], SPOKE TO FILI, PROVIDED 
REFERRAL INFORMATION AND ADVISED OF PLANNED DISCHARGE 19.  THEY WILL 
ACCEPT PT FOR ADMIT ON 19.  CM FAXED HOME HEALTH REFERRAL TO [facility] AT 
[phone].  CM SPOKE TO GINGER MILLS IN HALLWAY AS DIRECTED BY PT; 
INFORMED OF ABOVE ARRANGEMENTS.  CM SPOKE TO BEDSIDE NURSE REGARDING FAMILY 
CONCERN OF OXYGEN NEED, BEDSIDE NURSE INFORMED CM THAT SHE WOULD ATTEMPT 
OXYGEN WEANING.  
  
[facility] ARRANGING HOSPITAL BED AND BEDSIDE COMMODE WITH FAMILY.
  PT'S INSURANCE WILL NOT PAY FOR TUBE FEEDING MATERIALS OR SUPPLIES - 
[facility] GETTING QUOTE FOR CASH PAY.  [facility] 
[facility] WILL ACCEPT PT AT DISCHARGE.    
  
FOR DISCHARGE, NOTIFY [facility] [phone], FAX DISCHARGE 
INFORMATION TO [facility] [phone].  CM TO CONTINUE TO FOLLOW AND ASSIST AS 
NEEDED.  
  
  
Albaro Chambers, CASE MANGEMENT
DCP- Discharge Planning
 
Updated by [id number]: Albaro Chambers on 19   4:27 pm CT
Patient Name:  CHANTAL TIPTON   
Encounter No:  [id number]   
:  1931   
Primary Insurance:  [employer] CHOICE PPO [employer]CR ADVANT  
Anticipated DC Date: 2019   
Planned Disposition:  HOME WITH HOME HEALTH  
External Planned Provider: [facility]  
  
  
DCP follow-up note: CM RECEIVED ORDER FOR HOME HEALTH, TUBE FEEDING AND 
HOSPITAL BED.  CM MET WITH PT AND GRAND NEPHEW'S SPOUSE IN ROOM.  CM BEGAN 
DISCUSSING ORDER, PT'S GRAND NEPHEW'S SPOUSE INFORMED CM THAT PT PLANS TO 
DISCHARGE TO [facility] FOR REHAB AND ASKED IF CM HAS HEARD FROM [facility] AS SHE DID NOT SEE THEM FRIDAY AS THEY WERE SUPPOSED TO EVALUATE PT 
THEN.  CM CALLED [facility], SPOKE TO LATONYA WHO WILL 
CHECK WITH MIRANDA TO FIND OUT WHAT HAS HAPPENED.  LATONYA WILL HAVE MIRANDA CALL 
CM AS SOON AS POSSIBLE.    
CM MET WITH PT, PT'S ELIAS AND PT'S GRAND NEPHEW'S NIECE.  PT'S ELIAS 
INFORMED CM THAT CM DOES NOT NEED TO SEE THEM AND THAT DR. LEPE HAS 
TAKEN CARE OF EVERYTHING. CM ASKED WHAT HAD BEEN WORKED OUT.  CM WAS ADVISED 
THAT PT WILL HAVE THE KYPHOPLASTY ON WEDNESDAY AND THEN GO TO [facility] 
FOR REHAB, DR. LEPE WILL TALK TO MIRANDA AT [facility] AND WORK THIS 
OUT.  CM NOTIFIED LIZZY EMANUEL AND DR. LEPE.    
  
CM RECEIVED CALL BACK FROM MIRANDA WHO SPOKE TO FAMILY, INFORMED THEM THAT PT 
IS NOT APPROPRIATE CANDIDATE FOR REHAB AS SHE IS REFUSING THERAPY SERICES AND 
 
 INFORMED CM THAT FAMILY HAS REFUSED LONG TERM CARE PLACEMENT AT [facility].    
  
CM MET WITH PT AND PT'S GRAND NEPHEWS SPOUSE IN ROOM.  CM DISCUSSED IMPORTANT 
MESSAGE FROM MEDICARE AND DISCUSSED HOW TO COMPLAIN IF NEEDED TO HOUSE 
SUPERVISOR, ADMINISTRATION AND IF NEEDED, QUALITY IMPROVEMENT OFFICE FOR 
MEDICARE.  JOHNNIEDENNIS INFORMED CM THAT SHE HAS COMPLAINED TO THE DOCTORS BUT HAS 
BEEN THINKING SHE NEEDS TO COMPLAIN TO SOMEONE ELSE. CM DISCUSSED DISCHARGE 
PLAN.  JOHNNIEDENNIS HAS TALKED TO PT, PT'S ELIAS AND MIRANDA AT [facility].  THEY 
ARE NOT GOING TO PUT PT INTO LONG TERM CARE AND WILL BE TAKING PT HOME AS 
DISCUSSED LAST WEEK; GINGER STATES PT IS NOT READY FOR HOSPICE BUT WANTS HOME 
HEALTH.  HOME HEALTH LISTING PROVIDED AND DISCUSSED, GINGER WANTS TO USE THE 
HIGHEST RATED COMPANY, PT TOLD GINGER TO SIGN THE FORMS.  GINGER SIGNED THE 
IMPORTANT MESSAGE FROM MEDICARE AND CHOICE FOR [facility].  GINGER 
STATES THAT THEY WILL NEED A HOSPITAL BED, BEDSIDE COMMODE, OXYGEN AND TUBE 
FEEDING SET UP FOR PT AT HOME. WHITNEY EXPLAINED THAT PT'S INSURANCE HAS TO PRE 
AUTHORIZE ALL MEDICAL EQUIPMENT AND THAT SINCE PT IS ABLE TO EAT, INSURANCE 
MAY NOT COVER TUBE FEEDING.  THEY HAVE NO PREFERENCE ON PROVIDER AND GINGER 
ASKED TO BE CONTACTED REGARDING ANY COPAYS FOR EQUIPMENT [phone].  
  
CM HAS ORDER FOR HOME HEALTH, HOSPITAL BED AND TUBE FEEDING.  CM WILL NEED 
ADDITIONAL ORDERS FOR BEDSIDE COMMODE AND OXYGEN TESTING.    
  
CM TO FIND IN NETWORK PROVIDER FOR PT'S INSURANCE TO ARRANGE NEEDED MEDICAL 
EQUIPMENT AS SOON AS POSSIBLE.  CM TO COMPLETE HOME HEALTH ARRANGEMENTS WITH 
[facility].  
  
Albaro Chambers
DCP- Discharge Planning
 
Updated by [id number]: Marli Vickers on 19  11:53 am CT
RECEIVED NOTICE THAT THE PATIENTS FAMILY WAS WANTING HER TO HAVE THE 
KYPHOPLASTY BEFORE SHE LEAVES THE HOSPITAL. IT WAS MENTIONED THAT THE 
PATIENTS POA HAS NOT BEEN HER AND THERE IS NO POA PAPERWORK IN THE CHART. I 
ASKED THE BEDSIDE NURSE PENG ENCARNACION TO COME INTO THE ROOM WITH ME TO DISCUSS 
THE NEED FOR POA PAPERWORK IN ORDER TO HAVE ANY CONSENTS FOR PROCEDURES 
SIGNED SINCE THE PATIENT WAS NOT ABLE TO SIGN HERSELF, AND BY THE HIERACHY OF 
LAW, THE POA OR NEXT OF KIN WOULD BE WHO NEEDED TO SIGN AND SINCE THEY (THE 
LADY AND ELIAS) WERE NOT BLOOD RELATED, THEY COULD NOT SIGN FOR HER AS THEY 
HAVE BEEN. AT THIS TIME I WAS QUICKLY TOLD THAT SHE WAS IN HER RIGHT MIND AND 
ABLE TO MAKE ALL HER OWN DECISIONS. THE LADY AT BEDSIDE PROCEEDED TO TELL ME 
ABOUT ALL THE TIMES PEOPLE CAME IN AND QUESTIONED HER ABOUT THE DATE, TIME, 
PRESIDENT, ETC AND SHE WAS ABLE TO ALWAYS ANSWER ALL THEIR QUESTIONS. SHE 
STATED THE PATIENT WAS ABLE TO SIGN FOR HERSELF. UP TO THIS POINT, THE 
PATIENT HAD NOT OPENED HER EYES AND HAD NOT SAID ANYTHING. IT TOOK ME 
TOUCHING THE PATIENT'S ARM TO GET HER TO OPEN HER EYES AND ACKNOWLEDGE MY 
PRESENCE AND ANSWER MY QUESTIONS. THE PATIENT IS WITH IT AND ABLE TO ANSWER 
QUESTIONS. AT THIS POINT I ASKED HER IF SHE WANTED TO HAVE THE KYPHOPLASTY 
DONE. SHE STATED YES. THE LADY AT BEDSIDE BECAME VERY AGITATED STATING THAT 
SHE KNEW ABOUT HIPPA LAWS AND WANTED TO KNOW WHY ANYONE EVEN ALLOWED THINGS 
TO HAPPEN UP UNTIL NOW. NO ONE HAD ASKED FOR ANYONE'S DRIVERS LICENES TO 
VERIFY THEY WERE WHO THEY SAID THEY WERE. I TRIED TO EXPLAINE THAT EVEN WITH 
 
THAT, WE HAD NO WAY TO ACTUALLY VERIFY THAT WITH A DRIVERS LICENSE BECAUSE 
ANYONE CAN SAY THEY ARE SOMEONES FAMILY AND WE DON'T KNOW OTHERWISE UNLESS 
THE PATIENT SAYS SO, BUT SHE CUT ME OFF AS I WAS TALKING. I STOOD AT BEDSIDE 
AND LISTENED TO HER RANT ABOUT ALL THE THINGS THAT SHE HAS WITNESSED HERE AT 
THIS HOSPITAL THAT HAS MADE HER SICK TO HER STOMACH ESPECIALLY ABOUT THE FACT 
THAT SHE CAME IN HERE WITH A HURT BACK AND NOW THEY ARE JUST WATCHING HER DIE.
 I APOLOGIZED TO HER ABOUT ALL OF THE THINGS THAT [facility] WAS UPSET ABOUT, AND 
THEN DIRECTED MY CONVERSATION TO THE PATIENT, EXPLAINING THAT IF SHE WANTED 
THEY KYPHOPLASTY AND HER URINE WAS CLEAR THAT I WOULD EXPLAIN THIS TO THE 
NURSE PRACTITIONER. THAT ULTIMATELY I AM HERE TO TAKE CARE OF HER AND MAKE 
SURE WE ARE DOING WHAT SHE WANTS. I ALSO EXPLAINED TO HER THAT SHE NEEDS TO 
SIGN ALL OF HER OWN PAPERWORK, EVEN IF IT IS JUST WITH AN "X". THAT WE SIGN 
AS WITNESSES TO STATE WE KNOW SHE UNDERSTANDS AND SHE IS SIGNING. EXPLAINED 
THAT AS LONG AS SHE IS ABLE TO MAKE ALL HER OWN DECISIONS, WE DID NOT NEED 
HER POA PAPERWORK. SHE IS IN AGREEMENT. THE GREAT NEPHEWS WIFE WHO IS AT 
BEDSIDE CONTINUED WITH HER RANT, I AGAIN APOLOGIZED AND THE BEDSIDE NURSE AND 
I LEFT THE ROOM. I EXPLAINED TO SCOTTIE THAT THE PATIENT WAS CURRENTLY IN HER 
RIGHT MIND AND SHE IS WANTING THEY KYPHOPLASTY, AND SHE RECONSULTED IR TO SEE 
THE PATIENT.
DCP- Discharge Planning
 
Updated by [id number]: Melanysarah Olivares on 19   4:25 pm CT
CM RECEIVED AN ORDER REGARDING PLANS TO DISCHARGE TO HOME W/ HOSPITAL BED , 
TUBE FEEDING AND HOME HEALTH. PATIENT'S NUTRITION NOTE IS 19. WILL NEED 
NUTRITION NOTE REGARDING PATIENT NEEDS FOR DISCHARGE. CM UNDERSTANDS THE 
PATIENT DOES EAT AND TAKE HER MEDICATIONS BY MOUTH. SHE IS PRESENTLY ON 
FEEDINGS VIA  PUMP. SHE HAS BEEN ADVANCED TO 60 CC/HR TODAY WHICH IS HER GOAL 
RATE.  
SPOKE WITH PRIMARY ELIUD AND SHE REPORTS NO RESIDUAL AT THIS TIME.  
WILL NEED TO HAVE [facility] COMPANY PROVIDE BED AND SPECIALTY MATTRESS  FOR HOME 
AND   
DETERMINE COVERAGE FOR TUBE FEEDING AND EQUIPMENT.  
CM TO FOLLOW UP IN AM.
DCP- Discharge Planning
 
Updated by [id number]: Albaro Chambers on 19   7:14 am CT
Patient Name:  CHANTAL TIPTON   
Encounter No:  [id number]   
:  1931   
Primary Insurance:  [employer] CHOICE PPO [facility] ADVANT  
Anticipated DC Date: 2019   
Planned Disposition:  Skilled Nursing Facility  
External Planned Provider: [facility], MEDICARE REHAB BED 
  
  
DCP follow-up note: CM FAXED UPDATE TO [facility], 
[phone] FOR REHAB REQUEST.  
  
CM WAITING ADMISSION DETERMINATION FROM [facility] FOR 
REHAB PLACEMENT.  
  
ALBARO CHAMBERS, CASE MANAGEMENT
 
DCP- Discharge Planning
 
Updated by [id number]: Albaro Chambers on 19   3:46 pm CT
Patient Name:  CHANTAL TIPTON   
Encounter No:  [id number]   
:  1931   
Primary Insurance:  [employer] CHOICE PPO [facility] ADVANT  
Anticipated DC Date: 2019   
Planned Disposition:  Skilled Nursing Facility  
External Planned Provider:  [facility], MEDICARE REHAB 
BED  
  
  
DCP follow-up note: CM MET WITH PT'S TIANNA MCKEON BY AMARI MYLES, 
[phone].  GINGER STATES THAT JULIANNE BERMUDEZ IS PT'S FIANCE.  PT HAS POWER 
OF  WITH GREAT NEPHEW, GINGERS SPOUSE, MAGED BERMUDEZ, WHO WORKS IN 
[employer].  GINGER WILL GET MAGED TO HOSPITAL ALONG WITH POWER OF  
PAPERWORK AS SOON AS POSSIBLE.  THEY ARE IN AGREEMENT WITH REHAB AT [facility], BUT [facility] MAY NOT ACCEPT AS PT HAS CONTINUED DECLINE.  [facility] TO COME TOMORROW TO EVALUATE PT FOR REHAB.  IF [facility] DOES 
NOT ACCEPT FOR REHAB, THEY ARE THINKING TO TAKE PT HOME TO [facility] HERE 
IN [place] AND GINGER ALONG WITH FAMILY WILL CARE FOR PT WITH HOME 
HOSPICE.  GINGER REPORTS THEY WILL MAKE THAT DECISION TOMORROW.  CM EXPLAINED 
THAT THE POWER OF  WILL HAVE TO BE IN AGREEMENT AND SIGN ANY NEEDED 
AUTHORIZATIONS FOR ENROLLMENT IN REHAB OR HOSPICE CARE.  GINGER REPORTS 
UNDERSTANDING, PROVIDED CELL PHONE NUMBER FOR MAGED BERMUDEZ, [phone].  
  
CM WAITING ADMISSION DETERMINATION FROM [facility] FOR 
REHAB PLACEMENT.  
  
ALBARO CHAMBERS, CASE MANAGEMENT
 
DCP- Discharge Planning
 
Updated by [id number]: Albaro Chambers on 19   3:34 pm CT
Patient Name:  CHANTAL TIPTON   
Encounter No:  [id number]   
:  1931   
Primary Insurance:  [employer] CHOICE PPO [facility] ADVANT  
Anticipated DC Date: 2019   
Planned Disposition:  Skilled Nursing Facility  
External Planned Provider: [facility], MEDICARE REHAB BED 
  
  
DCP follow-up note: CM MET WITH PT AND SON/POA, JULIANNE BERMUDEZ, AT FAMILY 
REQUEST.  MR. BERMUDEZ REPORTS IT IS NOW TIME TO SEND REFERRAL TO [facility] 
FOR REHAB PLACEMENT.  IMPORTANT MESSAGE FROM MEDICARE PROVIDED AND EXPLAINED. 
  
CM FAXED REFERRAL TO [facility], [phone].   
  
CM WAITING ADMISSION DETERMINATION FROM [facility] FOR 
REHAB PLACEMENT.  
 
  
ALBARO CHAMBERS CASE MANAGEMENT
DCP- Discharge Planning
 
Updated by [id number]: Albaro Chambers on 19   4:52 pm CT
Patient Name: CHANTAL TIPTON                                     
Admission Status: Elective   
Accout number: [id number]                              
Admission Date: 2019   
: 1931                                                        
Admission Diagnosis:ACUTE KIDNEY FAILURE, UNSPECIFIED   
Attending: SHARATH TORRES                                                
Current LOS:  2   
  
Anticipated DC Date:    
Planned Disposition: Skilled Nursing Facility   
Primary Insurance: [employer] CHOICE PPO [employer] ADVANT   
PLANNED EXTERNAL PROVIDER:  [facility], MEDICARE REHAB 
BED  
  
Discharge Planning Comments:   
CM RECEIVED ORDER INDICATING PT WANTS NURSING HOME CARE.  CM MET WITH PT AND 
SON IN ROOM TO DISCUSS DISCHARGE PLANNING AND NEEDS. PT REPORTS THAT BEFORE 
GETTING SICK, SHE WAS LIVING AT HOME WITH HER SON, INDEPENDENT IN HER CARE.  
PT REPORTS SHE DID TELL THE DOCTOR THAT SHE WOULD BE BETTER OFF IN A NURSING 
HOME BEFORE FAMILY ARRIVED TODAY.  PT WANTS REHAB, NOT LONG TERM CARE.   PT 
HAS NO MEDICAL EQUIPMENT AND NO OUTSIDE SERVICES ASSISTING IN THE HOME. CM 
DISCUSSED AVAILABILITY OF HOME HEALTH, REHAB SERVICES AND MEDICAL EQUIPMENT. 
PT'S SON INITIALLY DECLINED TO PARTICIPATE IN DISCHARGE PLANNING REPORTING PT 
WAS SENT TO REHAB DOWNSTAIRS TOO SOON WHEN SHE WAS NOT ABLE TO WALK OR EVEN 
FEED HERSELF. SON WANTS PT CONSIDERED FOR INPATIENT REHAB AGAIN STATING THEY 
SENT HER BACK HERE BECAUSE SHE USED ALL OF HER DAYS DOWN THERE.  PT DOES NOT 
THINK SHE CAN PARTICIPATE IN THREE HOURS OF PROGRESSIVE THERAPY PER DAY.  CM 
EXPLAINED THAT IF PT IS NOT ABLE TO PARTICIPATE IN THE THREE HOURS OF 
PROGRESSIVE THERAPY, IS NOT ABLE TO STAND OR FEED HERSELF, SHE IS NOT GOING 
TO BE APPROPRIATE FOR READMISSION TO INPATENT REHAB.  CM DISCUSSED 
AVAILABILITY OF SKILLED NURSING REHAB SERVICES AND PROVIDED CHOICE FORM.  
PT'S SON REPORTS HE ALREADY SIGNED ONE OF THESE FOR [facility] THE LAST 
VISIT AND SPEAKING WITH THIS CM.  CM EXPLAINED A NEW ONE WAS NEEDED.  PT'S 
SON SIGNED THE FORM AND DOES NOT WANT PT SENT TO REHAB BEFORE SHE IS STABLE.  
CM EXPLAINED THAT ALL DOCTORS WOULD HAVE TO INDICATE PT IS READY TO DISCHARGE 
AND CM IS ONLY WANTING TO BE PROACTIVE ON PT'S BEHALF FOR DISCHARGE PLANNING 
AND SECURE REHAB BED AHEAD OF TIME TO ENSURE THAT PT IS ABLE TO GET INTO [facility] WHEN DISCHARGED AS [facility] IS POPULAR REHAB AND DOES FILL UP.  
PT'S SON WILL WANTS PT TO BE STABLE FOR DISCHARGE FOR CM TO SEND REFERRALS.  
CHOICE SIGNED. CM PROVIDED PT'S SON WITH CM CONTACT INFORMATION.  
  
CM TO SEND REFERRAL TO [facility] WHEN PROJECTED 
DISCHARGE DATE IS KNOWN, AS PT'S SON DOES NOT WANT REHAB REFERRAL SENT OUT 
PRIOR TO PT'S MEDICAL STABILITY FOR DISCHARGE TO REHAB.  
  
: Albaro Chambers
 
 
 DCPIA - Discharge Planning Initial Assessment
 
Updated by [id number]: Albaro Chambers on 19   1:21 pm
*  Is the patient Alert and Oriented?
Yes
*  How many steps to enter\exit or inside your home? NONE *  PCP DR. MCCALL *  Pharmacy
[facility]
*  Preadmission Environment
Acute Inpatient Rehab
*  Facility Name
[facility]
*  ADLs
Total Dependent
*  Equipment
None
*  Other Equipment
NO MEDICAL EQUIPMENT PROVIDER PREFERECE
*  List name and contact numbers for known caregivers / representatives who 
currently or will assist patient after discharge:
ELIAS KUNZ [phone]
*  Verbal permission to speak to the caregivers and representatives has been 
obtained from the patient.
Yes
*  Community resources currently utilized
None
*  Please name any agencies selected above.
NONE
*  Additional services required to return to the preadmission environment?
Yes
*  Can the patient safely return to the preadmission environment?
Yes
*  Has this patient been hospitalized within the prior 30 days at any 
hospital?
Yes
 
 
 
 
 
Coverage Notice
 
Reviewer: WILLIAN Chambers
 
Notice Issued Date-Time: 2019  13:00
Notice Type: Patient Choice Letter
 
Notice Delivered To: Family Member
Relationship to Patient: Other Relationship
Representative Name: JULIANNE BERMUDEZ
 
Delivery Method: HAND - Hand Delivered
 
Ariadne Days:
Prior Verbal Notification: 
 
Recipient Understood Notice: Yes
Recipient Signature: Yes
Med Rec Note Co-signed by Attending:
 
Coverage Notice Comment:  [facility]
Reviewer: WILLIAN Chambers
 
Notice Issued Date-Time: 2019  12:20
Notice Type: IM Discharge Notice
 
Notice Delivered To: Family Member
Relationship to Patient: Other Relationship
Representative Name: JULIANNE BERMUDEZ
 
Delivery Method: HAND - Hand Delivered
Ariadne Days:
Prior Verbal Notification: 
 
Recipient Understood Notice: Yes
Recipient Signature: Yes
Med Rec Note Co-signed by Attending:
 
Coverage Notice Comment:  
Reviewer: WILLIAN Chambers
 
Notice Issued Date-Time: 2019  16:20
Notice Type: IM Discharge Notice
 
Notice Delivered To: Family Member
Relationship to Patient: Other Relationship
Representative Name: AMARI BERMUDEZ
 
Delivery Method: HAND - Hand Delivered
Ariadne Days:
Prior Verbal Notification: 
 
Recipient Understood Notice: Yes
Recipient Signature: Yes
Med Rec Note Co-signed by Attending:
 
Coverage Notice Comment:  
Reviewer: WILLIAN Chambers
 
Notice Issued Date-Time: 2019  16:20
Notice Type: Patient Choice Letter
 
Notice Delivered To: Family Member
Relationship to Patient: Other Relationship
Representative Name: AMARI BERMUDEZ
 
 
Delivery Method: HAND - Hand Delivered
Ariadne Days:
Prior Verbal Notification: 
 
Recipient Understood Notice: Yes
Recipient Signature: Yes
Med Rec Note Co-signed by Attending:
 
Coverage Notice Comment:  [facility]
 
Last DP export: 19  10:24 a
Patient Name: CHANTAL TIPTON
Encounter #: [id number]
Page [id number]
 
 
 
 
 
Electronically Signed by CHELE RAMON on 19 at 1139
 
 
 
 
 
 
**All edits/amendments must be made on the electronic document**
 
DICTATION DATE: 19 113     : KARY  19 1139     
RPT#: [id number]                                DC DATE:        
                                               STATUS: ADM IN  
[facility]
 [place] [address]
***END OF REPORT***

## 2019-01-22 NOTE — NUR
PATIENT RESTING COMFORTAABLY IN BED. RESPIRATIONS ARE EVEN AND UNLABORED. NO
S/S OF DISTRESS. NO C/O PAIN. CALL LIGHT WITHIN REACH. WILL CPOC.

## 2019-01-22 NOTE — NUR
ATTEMPTED TO WEAN PTS O2 DOWN AS SHE DOESNT NORMALLY REQUIRE IT. KEPT PT OFF
FOR ABOUT AN HOUR AND SHE REMAINED WITH NORMAL EVEN RR. PULSE OX STAYED IN
NORMAL RANGE AND IS 98% ON RA. WILL CTM.

## 2019-01-22 NOTE — NUR
PT RESTING IN BED COMFORTABLY. PT AWAKENS TO VOICE. PT IS PLEASENT BUT
LATHATGIC. RESPIRATIONS EVEN AND SHALLOW. PT DENIES ANY PAIN OR NEEDS AT THIS
TIME. BED LOW SIDE RAILS UP X2. CALL LIGHT WITHIN REACH. WILL CONTINUE TO
MONITOR.

## 2019-01-22 NOTE — MORECARE
CASE MANAGEMENT DISCHARGE SUMMARY
 
 
PATIENT: CHANTAL TIPTON                        UNIT: [id number]
ACCOUNT#: [id number]                       ADM DATE: 19
AGE: 88     : [birth year]  SEX: F            ROOM/BED: D.2115    
AUTHOR: YENNY,DOC                             PHYSICIAN:                               
 
REFERRING PHYSICIAN: SHARATH TORRES MD               
DATE OF SERVICE: 19
Discharge Plan
 
 
Patient Name: CHANTAL TIPTON
Facility: [facility]
Encounter #: [id number]
Medical Record #: [id number]
: 1931
Planned Disposition: Home with Home Health
Anticipated Discharge Date: 19
 
Discharge Date: 
Expected LOS: 21
Initial Reviewer: [id number]
Initial Review Date: 2019
Generated: 19   3:56 pm 
Comments
 
DCP- Discharge Planning
 
Updated by [id number]: Albaro Chambers on 19  10:35 am CT
Patient Name:  CHANTAL TIPTON   
Encounter No:  [id number]   
:  1931   
Primary Insurance:  [employer] CHOICE PPO [employer] ADVANT  
Anticipated DC Date: 2019   
Planned Disposition:  Home with Home Health  
External Planned Provider: [facility]  
  
  
DCP follow-up note: CM CALLED DWAINE AT [facility], [phone], PROVIDED 
INFORMATION FOR HOSPITAL BED AND BEDSIDE COMMODE, INFORMED OF PLANNED 
DISCHARGE TOMORROW.  CM FAXED ORDER AND REFERRAL INFORMATION TO [facility] AT 
[phone].  [facility] TO ARRANGE DELIVERY OF MEDICAL EQUIPMENT WITH PT'S 
FAMILY.  
  
CM CALLED [facility], [phone], SPOKE TO MARGARET 
REGARDING TUBE FEEDING ORDER; MARGARET ADVISED THAT PT'S INSURANCE WILL NOT PAY 
FOR SUPPLEMENTAL NUTRITION AS PT IS ON REGULAR DIET AND ABLE TO EAT BY MOUTH. 
 MARGARET WILL REVIEW REFERRAL TO SEE IF THERE IS SOME WAY THAT INSURANCE MAY 
 
COVER THE SERVICE.  CM SPOKE TO PT IN ROOM, INFORMED OF ABOVE; PT ASKED FOR 
CM TO GET CASH PAY PRICE FOR TUBE FEEDING AND DISCUSS WITH GINGER, HER NIECE. 
 CM FAXED REFERRAL TO [facility] [phone].  
  
CM CALLED [facility], [phone], SPOKE TO FILI, PROVIDED 
REFERRAL INFORMATION AND ADVISED OF PLANNED DISCHARGE 19.  THEY WILL 
ACCEPT PT FOR ADMIT ON 19.  CM FAXED HOME HEALTH REFERRAL TO [facility] AT 
[phone].  CM SPOKE TO GINGER MILLS IN [place] AS DIRECTED BY PT; 
INFORMED OF ABOVE ARRANGEMENTS.  CM SPOKE TO BEDSIDE NURSE REGARDING FAMILY 
CONCERN OF OXYGEN NEED, BEDSIDE NURSE INFORMED CM THAT SHE WOULD ATTEMPT 
OXYGEN WEANING.  
  
[facility] ARRANGING HOSPITAL BED AND BEDSIDE COMMODE WITH FAMILY.
  PT'S INSURANCE WILL NOT PAY FOR TUBE FEEDING MATERIALS OR SUPPLIES - 
[facility] / [facility] GETTING QUOTE FOR CASH PAY.  [facility] 
[facility] WILL ACCEPT PT AT DISCHARGE.    
  
FOR DISCHARGE, NOTIFY [facility] [phone], FAX DISCHARGE 
INFORMATION TO [facility] [phone].  CM TO CONTINUE TO FOLLOW AND ASSIST AS 
NEEDED.  
  
  
Albaro Chambers, CASE MANGEMENT
DCP- Discharge Planning
 
Updated by [id number]: Albaro Chambers on 19   4:27 pm CT
Patient Name:  CHANTAL TIPTON   
Encounter No:  [id number]   
:  1931   
Primary Insurance:  HU[employer] CHOICE PPO [employer] ADVANT  
Anticipated DC Date: 2019   
Planned Disposition:  HOME WITH HOME HEALTH  
External Planned Provider: [facility]  
  
  
DCP follow-up note: CM RECEIVED ORDER FOR HOME HEALTH, TUBE FEEDING AND 
HOSPITAL BED.  CM MET WITH PT AND GRAND NEPHEW'S SPOUSE IN ROOM.  CM BEGAN 
DISCUSSING ORDER, PT'S GRAND NEPHEW'S SPOUSE INFORMED CM THAT PT PLANS TO 
DISCHARGE TO [place] FOR REHAB AND ASKED IF CM HAS HEARD FROM [facility] AS SHE DID NOT SEE THEM FRIDAY AS THEY WERE SUPPOSED TO EVALUATE PT 
THEN.  CM CALLED [facility], SPOKE TO LATONYA WHO WILL 
CHECK WITH MIRANDA TO FIND OUT WHAT HAS HAPPENED.  LATONYA WILL HAVE MIRANDA CALL 
CM AS SOON AS POSSIBLE.    
CM MET WITH PT, PT'S ELIAS AND PT'S GRAND NEPHEW'S NIECE.  PT'S ELIAS 
INFORMED CM THAT CM DOES NOT NEED TO SEE THEM AND THAT DR. LEPE HAS 
TAKEN CARE OF EVERYTHING. CM ASKED WHAT HAD BEEN WORKED OUT.  CM WAS ADVISED 
THAT PT WILL HAVE THE KYPHOPLASTY ON WEDNESDAY AND THEN GO TO [place] 
FOR REHAB, DR. LEPE WILL TALK TO MIRANDA AT [facility] AND WORK THIS 
OUT.  CM NOTIFIED LIZZY EMANUEL AND DR. LEPE.    
  
CM RECEIVED CALL BACK FROM MIRANDA WHO SPOKE TO FAMILY, INFORMED THEM THAT PT 
IS NOT APPROPRIATE CANDIDATE FOR REHAB AS SHE IS REFUSING THERAPY SERICES AND 
 
 INFORMED CM THAT FAMILY HAS REFUSED LONG TERM CARE PLACEMENT AT [facility].    
  
CM MET WITH PT AND PT'S GRAND NEPHEWS SPOUSE IN ROOM.  CM DISCUSSED IMPORTANT 
MESSAGE FROM MEDICARE AND DISCUSSED HOW TO COMPLAIN IF NEEDED TO HOUSE 
SUPERVISOR, ADMINISTRATION AND IF NEEDED, QUALITY IMPROVEMENT OFFICE FOR 
MEDICARE.  JOHNNIEDENNIS INFORMED CM THAT SHE HAS COMPLAINED TO THE DOCTORS BUT HAS 
BEEN THINKING SHE NEEDS TO COMPLAIN TO SOMEONE ELSE. CM DISCUSSED DISCHARGE 
PLAN.  JOHNNIEDENNIS HAS TALKED TO PT, PT'S ELIAS AND MIRANDA AT [facility].  THEY 
ARE NOT GOING TO PUT PT INTO LONG TERM CARE AND WILL BE TAKING PT HOME AS 
DISCUSSED LAST WEEK; GINGER STATES PT IS NOT READY FOR HOSPICE BUT WANTS HOME 
HEALTH.  HOME HEALTH LISTING PROVIDED AND DISCUSSED, GINGER WANTS TO USE THE 
HIGHEST RATED COMPANY, PT TOLD GINGER TO SIGN THE FORMS.  GINGER SIGNED THE 
IMPORTANT MESSAGE FROM MEDICARE AND CHOICE FOR [facility].  GINGER 
STATES THAT THEY WILL NEED A HOSPITAL BED, BEDSIDE COMMODE, OXYGEN AND TUBE 
FEEDING SET UP FOR PT AT HOME. WHITNEY EXPLAINED THAT PT'S INSURANCE HAS TO PRE 
AUTHORIZE ALL MEDICAL EQUIPMENT AND THAT SINCE PT IS ABLE TO EAT, INSURANCE 
MAY NOT COVER TUBE FEEDING.  THEY HAVE NO PREFERENCE ON PROVIDER AND GINGER 
ASKED TO BE CONTACTED REGARDING ANY COPAYS FOR EQUIPMENT [phone].  
  
CM HAS ORDER FOR HOME HEALTH, HOSPITAL BED AND TUBE FEEDING.  CM WILL NEED 
ADDITIONAL ORDERS FOR BEDSIDE COMMODE AND OXYGEN TESTING.    
  
CM TO FIND IN NETWORK PROVIDER FOR PT'S INSURANCE TO ARRANGE NEEDED MEDICAL 
EQUIPMENT AS SOON AS POSSIBLE.  CM TO COMPLETE HOME HEALTH ARRANGEMENTS WITH 
[employer].  
  
Albaro Chambers
DCP- Discharge Planning
 
Updated by [id number]: Marli Vickers on 19  11:53 am CT
RECEIVED NOTICE THAT THE PATIENTS FAMILY WAS WANTING HER TO HAVE THE 
KYPHOPLASTY BEFORE SHE LEAVES THE HOSPITAL. IT WAS MENTIONED THAT THE 
PATIENTS POA HAS NOT BEEN HER AND THERE IS NO POA PAPERWORK IN THE CHART. I 
ASKED THE BEDSIDE NURSE PENG ENCARNACION TO COME INTO THE ROOM WITH ME TO DISCUSS 
THE NEED FOR POA PAPERWORK IN ORDER TO HAVE ANY CONSENTS FOR PROCEDURES 
SIGNED SINCE THE PATIENT WAS NOT ABLE TO SIGN HERSELF, AND BY THE HIERACHY OF 
LAW, THE POA OR NEXT OF KIN WOULD BE WHO NEEDED TO SIGN AND SINCE THEY (THE 
LADY AND ELIAS) WERE NOT BLOOD RELATED, THEY COULD NOT SIGN FOR HER AS THEY 
HAVE BEEN. AT THIS TIME I WAS QUICKLY TOLD THAT SHE WAS IN HER RIGHT MIND AND 
ABLE TO MAKE ALL HER OWN DECISIONS. THE LADY AT BEDSIDE PROCEEDED TO TELL ME 
ABOUT ALL THE TIMES PEOPLE CAME IN AND QUESTIONED HER ABOUT THE DATE, TIME, 
PRESIDENT, ETC AND SHE WAS ABLE TO ALWAYS ANSWER ALL THEIR QUESTIONS. SHE 
STATED THE PATIENT WAS ABLE TO SIGN FOR HERSELF. UP TO THIS POINT, THE 
PATIENT HAD NOT OPENED HER EYES AND HAD NOT SAID ANYTHING. IT TOOK ME 
TOUCHING THE PATIENT'S ARM TO GET HER TO OPEN HER EYES AND ACKNOWLEDGE MY 
PRESENCE AND ANSWER MY QUESTIONS. THE PATIENT IS WITH IT AND ABLE TO ANSWER 
QUESTIONS. AT THIS POINT I ASKED HER IF SHE WANTED TO HAVE THE KYPHOPLASTY 
DONE. SHE STATED YES. THE LADY AT BEDSIDE BECAME VERY AGITATED STATING THAT 
SHE KNEW ABOUT HIPPA LAWS AND WANTED TO KNOW WHY ANYONE EVEN ALLOWED THINGS 
TO HAPPEN UP UNTIL NOW. NO ONE HAD ASKED FOR ANYONE'S DRIVERS LICENES TO 
VERIFY THEY WERE WHO THEY SAID THEY WERE. I TRIED TO EXPLAINE THAT EVEN WITH 
 
THAT, WE HAD NO WAY TO ACTUALLY VERIFY THAT WITH A DRIVERS LICENSE BECAUSE 
ANYONE CAN SAY THEY ARE SOMEONES FAMILY AND WE DON'T KNOW OTHERWISE UNLESS 
THE PATIENT SAYS SO, BUT SHE CUT ME OFF AS I WAS TALKING. I STOOD AT BEDSIDE 
AND LISTENED TO HER RANT ABOUT ALL THE THINGS THAT SHE HAS WITNESSED HERE AT 
THIS HOSPITAL THAT HAS MADE HER SICK TO HER STOMACH ESPECIALLY ABOUT THE FACT 
THAT SHE CAME IN HERE WITH A HURT BACK AND NOW THEY ARE JUST WATCHING HER DIE.
 I APOLOGIZED TO HER ABOUT ALL OF THE THINGS THAT [facility] WAS UPSET ABOUT, AND 
THEN DIRECTED MY CONVERSATION TO THE PATIENT, EXPLAINING THAT IF SHE WANTED 
THEY KYPHOPLASTY AND HER URINE WAS CLEAR THAT I WOULD EXPLAIN THIS TO THE 
NURSE PRACTITIONER. THAT ULTIMATELY I AM HERE TO TAKE CARE OF HER AND MAKE 
SURE WE ARE DOING WHAT SHE WANTS. I ALSO EXPLAINED TO HER THAT SHE NEEDS TO 
SIGN ALL OF HER OWN PAPERWORK, EVEN IF IT IS JUST WITH AN "X". THAT WE SIGN 
AS WITNESSES TO STATE WE KNOW SHE UNDERSTANDS AND SHE IS SIGNING. EXPLAINED 
THAT AS LONG AS SHE IS ABLE TO MAKE ALL HER OWN DECISIONS, WE DID NOT NEED 
HER POA PAPERWORK. SHE IS IN AGREEMENT. THE GREAT NEPHEWS WIFE WHO IS AT 
BEDSIDE CONTINUED WITH HER RANT, I AGAIN APOLOGIZED AND THE BEDSIDE NURSE AND 
I LEFT THE ROOM. I EXPLAINED TO SCOTTIE THAT THE PATIENT WAS CURRENTLY IN HER 
RIGHT MIND AND SHE IS WANTING THEY KYPHOPLASTY, AND SHE RECONSULTED IR TO SEE 
THE PATIENT.
DCP- Discharge Planning
 
Updated by [id number]: Melanysarah Olivares on 19   4:25 pm CT
CM RECEIVED AN ORDER REGARDING PLANS TO DISCHARGE TO HOME W/ HOSPITAL BED , 
TUBE FEEDING AND HOME HEALTH. PATIENT'S NUTRITION NOTE IS 19. WILL NEED 
NUTRITION NOTE REGARDING PATIENT NEEDS FOR DISCHARGE. CM UNDERSTANDS THE 
PATIENT DOES EAT AND TAKE HER MEDICATIONS BY MOUTH. SHE IS PRESENTLY ON 
FEEDINGS VIA  PUMP. SHE HAS BEEN ADVANCED TO 60 CC/HR TODAY WHICH IS HER GOAL 
RATE.  
SPOKE WITH PRIMARY ELIUD AND SHE REPORTS NO RESIDUAL AT THIS TIME.  
WILL NEED TO HAVE [facility] PROVIDE BED AND SPECIALTY MATTRESS  FOR HOME 
AND   
DETERMINE COVERAGE FOR TUBE FEEDING AND EQUIPMENT.  
CM TO FOLLOW UP IN AM.
DCP- Discharge Planning
 
Updated by [id number]: Albaro Chambers on 19   7:14 am CT
Patient Name:  CHANTAL TIPTON   
Encounter No:  [id number]   
:  1931   
Primary Insurance:  HUMANA CHOICE PPO [facility] ADVANT  
Anticipated DC Date: 2019   
Planned Disposition:  Skilled Nursing Facility  
External Planned Provider: [facility], MEDICARE REHAB BED 
  
  
DCP follow-up note: CM FAXED UPDATE TO [facility], 
[phone] FOR REHAB REQUEST.  
  
CM WAITING ADMISSION DETERMINATION FROM [facility] FOR 
REHAB PLACEMENT.  
  
ALBARO CHAMBERS, CASE MANAGEMENT
 
DCP- Discharge Planning
 
Updated by [id number]: Albaro Chambers on 19   3:46 pm CT
Patient Name:  CHANTAL TIPTON   
Encounter No:  [id number]   
:  1931   
Primary Insurance:  [employer] CHOICE PPO [facility] ADVANT  
Anticipated DC Date: 2019   
Planned Disposition:  Skilled Nursing Facility  
External Planned Provider:  [facility], MEDICARE REHAB 
BED  
  
  
DCP follow-up note: CM MET WITH PT'S TIANNA MCKEON BY [facility] MYLES, 
[phone].  GINGER STATES THAT JULIANNE BERMUDEZ IS PT'S FIANCE.  PT HAS POWER 
OF  WITH GREAT NEPHEW, GINGERS SPOUSE, MAGED BERMUDEZ, WHO WORKS IN 
[place].  GINGER WILL GET MAGED TO HOSPITAL ALONG WITH POWER OF  
PAPERWORK AS SOON AS POSSIBLE.  THEY ARE IN AGREEMENT WITH REHAB AT [facility], BUT [facility] MAY NOT ACCEPT AS PT HAS CONTINUED DECLINE.  [facility] TO COME TOMORROW TO EVALUATE PT FOR REHAB.  IF [facility] DOES 
NOT ACCEPT FOR REHAB, THEY ARE THINKING TO TAKE PT HOME TO [facility] HERE 
IN [place] AND GINGER ALONG WITH FAMILY WILL CARE FOR PT WITH HOME 
HOSPICE.  GINGER REPORTS THEY WILL MAKE THAT DECISION TOMORROW.  CM EXPLAINED 
THAT THE POWER OF  WILL HAVE TO BE IN AGREEMENT AND SIGN ANY NEEDED 
AUTHORIZATIONS FOR ENROLLMENT IN REHAB OR HOSPICE CARE.  GINGER REPORTS 
UNDERSTANDING, PROVIDED CELL PHONE NUMBER FOR MAGED BERMUDEZ, [phone].  
  
CM WAITING ADMISSION DETERMINATION FROM [facility] FOR 
REHAB PLACEMENT.  
  
ALBARO CHAMBERS, CASE MANAGEMENT
 
DCP- Discharge Planning
 
Updated by [id number]: Albaro Chambers on 19   3:34 pm CT
Patient Name:  CHANTAL TIPTON   
Encounter No:  [id number]   
:  1931   
Primary Insurance:  [employer] CHOICE PPO [facility] ADVANT  
Anticipated DC Date: 2019   
Planned Disposition:  Skilled Nursing Facility  
External Planned Provider: [facility], MEDICARE REHAB BED 
  
  
DCP follow-up note: CM MET WITH PT AND SON/POA, JULIANNE BERMUDEZ, AT FAMILY 
REQUEST.  MR. BERMUDEZ REPORTS IT IS NOW TIME TO SEND REFERRAL TO [facility] 
FOR REHAB PLACEMENT.  IMPORTANT MESSAGE FROM MEDICARE PROVIDED AND EXPLAINED. 
  
CM FAXED REFERRAL TO [facility], [phone].   
  
CM WAITING ADMISSION DETERMINATION FROM [facility] FOR 
REHAB PLACEMENT.  
 
  
ALBARO CHAMBERS CASE MANAGEMENT
DCP- Discharge Planning
 
Updated by [id number]: Albaro Chambers on 19   4:52 pm CT
Patient Name: CHANTAL TIPTON                                     
Admission Status: Elective   
Accout number: [id number]                              
Admission Date: 2019   
: 1931                                                        
Admission Diagnosis:ACUTE KIDNEY FAILURE, UNSPECIFIED   
Attending: SHARATH TORRES                                                
Current LOS:  2   
  
Anticipated DC Date:    
Planned Disposition: Skilled Nursing Facility   
Primary Insurance: [employer] CHOICE PPO [employer] ADVANT   
PLANNED EXTERNAL PROVIDER:  [facility], MEDICARE REHAB 
BED  
  
Discharge Planning Comments:   
CM RECEIVED ORDER INDICATING PT WANTS NURSING HOME CARE.  CM MET WITH PT AND 
SON IN ROOM TO DISCUSS DISCHARGE PLANNING AND NEEDS. PT REPORTS THAT BEFORE 
GETTING SICK, SHE WAS LIVING AT HOME WITH HER SON, INDEPENDENT IN HER CARE.  
PT REPORTS SHE DID TELL THE DOCTOR THAT SHE WOULD BE BETTER OFF IN A NURSING 
HOME BEFORE FAMILY ARRIVED TODAY.  PT WANTS REHAB, NOT LONG TERM CARE.   PT 
HAS NO MEDICAL EQUIPMENT AND NO OUTSIDE SERVICES ASSISTING IN THE HOME. CM 
DISCUSSED AVAILABILITY OF HOME HEALTH, REHAB SERVICES AND MEDICAL EQUIPMENT. 
PT'S SON INITIALLY DECLINED TO PARTICIPATE IN DISCHARGE PLANNING REPORTING PT 
WAS SENT TO REHAB DOWNSTAIRS TOO SOON WHEN SHE WAS NOT ABLE TO WALK OR EVEN 
FEED HERSELF. SON WANTS PT CONSIDERED FOR INPATIENT REHAB AGAIN STATING THEY 
SENT HER BACK HERE BECAUSE SHE USED ALL OF HER DAYS DOWN THERE.  PT DOES NOT 
THINK SHE CAN PARTICIPATE IN THREE HOURS OF PROGRESSIVE THERAPY PER DAY.  CM 
EXPLAINED THAT IF PT IS NOT ABLE TO PARTICIPATE IN THE THREE HOURS OF 
PROGRESSIVE THERAPY, IS NOT ABLE TO STAND OR FEED HERSELF, SHE IS NOT GOING 
TO BE APPROPRIATE FOR READMISSION TO INPATENT REHAB.  CM DISCUSSED 
AVAILABILITY OF SKILLED NURSING REHAB SERVICES AND PROVIDED CHOICE FORM.  
PT'S SON REPORTS HE ALREADY SIGNED ONE OF THESE FOR [facility] THE LAST 
VISIT AND SPEAKING WITH THIS CM.  CM EXPLAINED A NEW ONE WAS NEEDED.  PT'S 
SON SIGNED THE FORM AND DOES NOT WANT PT SENT TO REHAB BEFORE SHE IS STABLE.  
CM EXPLAINED THAT ALL DOCTORS WOULD HAVE TO INDICATE PT IS READY TO DISCHARGE 
AND CM IS ONLY WANTING TO BE PROACTIVE ON PT'S BEHALF FOR DISCHARGE PLANNING 
AND SECURE REHAB BED AHEAD OF TIME TO ENSURE THAT PT IS ABLE TO GET INTO [facility] WHEN DISCHARGED AS [facility] IS POPULAR REHAB AND DOES FILL UP.  
PT'S SON WILL WANTS PT TO BE STABLE FOR DISCHARGE FOR CM TO SEND REFERRALS.  
CHOICE SIGNED. CM PROVIDED PT'S SON WITH CM CONTACT INFORMATION.  
  
CM TO SEND REFERRAL TO [facility] WHEN PROJECTED 
DISCHARGE DATE IS KNOWN, AS PT'S SON DOES NOT WANT REHAB REFERRAL SENT OUT 
PRIOR TO PT'S MEDICAL STABILITY FOR DISCHARGE TO REHAB.  
  
: Albaro Chambers
 
 
 DCPIA - Discharge Planning Initial Assessment
 
Updated by [id number]: Albaro Chambers on 19   1:21 pm
*  Is the patient Alert and Oriented?
Yes
*  How many steps to enter\exit or inside your home? NONE *  PCP DR. MCCALL *  Pharmacy
[facility]
*  Preadmission Environment
Acute Inpatient Rehab
*  Facility Name
[facility]
*  ADLs
Total Dependent
*  Equipment
None
*  Other Equipment
NO MEDICAL EQUIPMENT PROVIDER PREFERECE
*  List name and contact numbers for known caregivers / representatives who 
currently or will assist patient after discharge:
ELIAS KUNZ [phone]
*  Verbal permission to speak to the caregivers and representatives has been 
obtained from the patient.
Yes
*  Community resources currently utilized
None
*  Please name any agencies selected above.
NONE
*  Additional services required to return to the preadmission environment?
Yes
*  Can the patient safely return to the preadmission environment?
Yes
*  Has this patient been hospitalized within the prior 30 days at any 
hospital?
Yes
 
 
 
 
 
Coverage Notice
 
Reviewer: WILLIAN Chambers
 
Notice Issued Date-Time: 2019  13:00
Notice Type: Patient Choice Letter
 
Notice Delivered To: Family Member
Relationship to Patient: Other Relationship
Representative Name: JULIANNE BERMUDEZ
 
Delivery Method: HAND - Hand Delivered
 
Ariadne Days:
Prior Verbal Notification: 
 
Recipient Understood Notice: Yes
Recipient Signature: Yes
Med Rec Note Co-signed by Attending:
 
Coverage Notice Comment:  [facility]
Reviewer: WILLIAN Chambers
 
Notice Issued Date-Time: 2019  12:20
Notice Type: IM Discharge Notice
 
Notice Delivered To: Family Member
Relationship to Patient: Other Relationship
Representative Name: JULIANNE BERMUDEZ
 
Delivery Method: HAND - Hand Delivered
Ariadne Days:
Prior Verbal Notification: 
 
Recipient Understood Notice: Yes
Recipient Signature: Yes
Med Rec Note Co-signed by Attending:
 
Coverage Notice Comment:  
Reviewer: WILLIAN Chambers
 
Notice Issued Date-Time: 2019  16:20
Notice Type: IM Discharge Notice
 
Notice Delivered To: Family Member
Relationship to Patient: Other Relationship
Representative Name: AMARI BERMUDEZ
 
Delivery Method: HAND - Hand Delivered
Ariadne Days:
Prior Verbal Notification: 
 
Recipient Understood Notice: Yes
Recipient Signature: Yes
Med Rec Note Co-signed by Attending:
 
Coverage Notice Comment:  
Reviewer: WILLIAN Chambers
 
Notice Issued Date-Time: 2019  16:20
Notice Type: Patient Choice Letter
 
Notice Delivered To: Family Member
Relationship to Patient: Other Relationship
Representative Name: AMARI BERMUDEZ
 
 
Delivery Method: HAND - Hand Delivered
Ariadne Days:
Prior Verbal Notification: 
 
Recipient Understood Notice: Yes
Recipient Signature: Yes
Med Rec Note Co-signed by Attending:
 
Coverage Notice Comment:  [facility]
 
Last DP export: 19  10:39 a
Patient Name: CHANTAL TIPTON
Encounter #: [id number]
Page [id number]
 
 
 
 
 
Electronically Signed by CHELE RAMON on 19 at 1457
 
 
 
 
 
 
**All edits/amendments must be made on the electronic document**
 
DICTATION DATE: 19     : KARY  19     
RPT#: [id number]                                DC DATE:        
                                               STATUS: ADM IN  
[facility]
 [place] [address]
***END OF REPORT***

## 2019-01-23 VITALS — SYSTOLIC BLOOD PRESSURE: 132 MMHG | DIASTOLIC BLOOD PRESSURE: 57 MMHG

## 2019-01-23 VITALS — SYSTOLIC BLOOD PRESSURE: 161 MMHG | DIASTOLIC BLOOD PRESSURE: 59 MMHG

## 2019-01-23 VITALS — SYSTOLIC BLOOD PRESSURE: 162 MMHG | DIASTOLIC BLOOD PRESSURE: 68 MMHG

## 2019-01-23 VITALS — DIASTOLIC BLOOD PRESSURE: 68 MMHG | SYSTOLIC BLOOD PRESSURE: 123 MMHG

## 2019-01-23 VITALS — SYSTOLIC BLOOD PRESSURE: 143 MMHG | DIASTOLIC BLOOD PRESSURE: 59 MMHG

## 2019-01-23 VITALS — SYSTOLIC BLOOD PRESSURE: 155 MMHG | DIASTOLIC BLOOD PRESSURE: 68 MMHG

## 2019-01-23 VITALS — SYSTOLIC BLOOD PRESSURE: 161 MMHG | DIASTOLIC BLOOD PRESSURE: 72 MMHG

## 2019-01-23 VITALS — DIASTOLIC BLOOD PRESSURE: 78 MMHG | SYSTOLIC BLOOD PRESSURE: 165 MMHG

## 2019-01-23 VITALS — DIASTOLIC BLOOD PRESSURE: 92 MMHG | SYSTOLIC BLOOD PRESSURE: 149 MMHG

## 2019-01-23 LAB
ANION GAP SERPL CALC-SCNC: 9.5 MMOL/L (ref 8–16)
APTT BLD: 33.7 SECONDS (ref 22.8–39.4)
BASOPHILS NFR BLD AUTO: 0.6 % (ref 0–2)
BUN SERPL-MCNC: 30 MG/DL (ref 7–18)
CALCIUM SERPL-MCNC: 7.2 MG/DL (ref 8.5–10.1)
CHLORIDE SERPL-SCNC: 106 MMOL/L (ref 98–107)
CO2 SERPL-SCNC: 28.6 MMOL/L (ref 21–32)
CREAT SERPL-MCNC: 1.2 MG/DL (ref 0.6–1.3)
EOSINOPHIL NFR BLD: 3.5 % (ref 0–7)
ERYTHROCYTE [DISTWIDTH] IN BLOOD BY AUTOMATED COUNT: 17.4 % (ref 11.5–14.5)
GLUCOSE SERPL-MCNC: 78 MG/DL (ref 74–106)
HCT VFR BLD CALC: 26 % (ref 36–48)
HGB BLD-MCNC: 8.1 G/DL (ref 12–16)
IMM GRANULOCYTES NFR BLD: 0.5 % (ref 0–5)
INR PPP: 1.14 (ref 0.85–1.17)
LYMPHOCYTES NFR BLD AUTO: 15.7 % (ref 15–50)
MCH RBC QN AUTO: 31.3 PG (ref 26–34)
MCHC RBC AUTO-ENTMCNC: 31.2 G/DL (ref 31–37)
MCV RBC: 100.4 FL (ref 80–100)
MONOCYTES NFR BLD: 11.2 % (ref 2–11)
NEUTROPHILS NFR BLD AUTO: 68.5 % (ref 40–80)
OSMOLALITY SERPL CALC.SUM OF ELEC: 282 MOSM/KG (ref 275–300)
PLATELET # BLD: 377 10X3/UL (ref 130–400)
PMV BLD AUTO: 10.1 FL (ref 7.4–10.4)
POTASSIUM SERPL-SCNC: 5.1 MMOL/L (ref 3.5–5.1)
PROTHROMBIN TIME: 14.1 SECONDS (ref 11.6–15)
RBC # BLD AUTO: 2.59 10X6/UL (ref 4–5.4)
SODIUM SERPL-SCNC: 139 MMOL/L (ref 136–145)
WBC # BLD AUTO: 8.3 10X3/UL (ref 4.8–10.8)

## 2019-01-23 PROCEDURE — 0PU43JZ SUPPLEMENT THORACIC VERTEBRA WITH SYNTHETIC SUBSTITUTE, PERCUTANEOUS APPROACH: ICD-10-PCS | Performed by: RADIOLOGY

## 2019-01-23 PROCEDURE — 0PS43ZZ REPOSITION THORACIC VERTEBRA, PERCUTANEOUS APPROACH: ICD-10-PCS | Performed by: RADIOLOGY

## 2019-01-23 PROCEDURE — 0PB43ZX EXCISION OF THORACIC VERTEBRA, PERCUTANEOUS APPROACH, DIAGNOSTIC: ICD-10-PCS | Performed by: RADIOLOGY

## 2019-01-23 NOTE — NUR
IR CALLED TO PRE-OP PT. PRE-OP MEDICATIONS GIVEN AND PT IS CLEAN AND DRY
WAITING TO BE PICKED UP. VISITORS AT BEDSIDE. NO FURTHER NEEDS AT THIS TIME.
WILL CTM.

## 2019-01-23 NOTE — NUR
PT LYING IN BED RESTING QUIETLY WITH VISITORS AT BEDSIDE. NO CURRENT NEEDS,
STILL NPO AND WAITING FOR PROCEDURE. CL IN REACH, BED IN LOWEST, SIDE RAILS
X2. WILL CTM.

## 2019-01-23 NOTE — NUR
CHECKED TO SEE IF PT WAS SOILED AND PULLED HER UP IN BED FOR COMFORT. NO
CURRENT NEEDS AND PT IS DRY. ALEXANDER DRAINING TO GRAVITY OFF R.SIDE OF BED, NO
KINKS OR ISSUES NOTED. CL IN REACH, BED IN LOWEST, SIDE RAILS X2. WILL CTM.

## 2019-01-23 NOTE — NUR
Nutrition follow-up:
Labs reviewed
 
Pt to surgery today then will discharge to home with TF
 
Recommend decreasing to 40 ml/hr to encourage increased po intake.  If po
intake improves to > 50% of meals, recommend TF stopped.
RDN following.

## 2019-01-23 NOTE — NUR
PT BACK FROM PROCEDURE AWAKENS EASILY PT IS CURRENTLY SLEEPY. PT IS TO LAY
FLAT X2 HOURS AND VERBALIZED UNDERSTANDING. PT STATES SHE FEELS "WONDERFUL"
RIGHT NOW AND DENIES ANY PAIN. PT HAS DRSG TO L.SIDE IN THE BACK CDI NO S/S OF
HEMATOMA OR BLEEDING NOTED. VSS AND BEING MONITERED PER POST PROCEDURE
PROTOCOL. PT HAS NEW BRUISING AND REDDNESS NOTED TO HER NECK AND TO THE R.SIDE
OF HER CHEEKBONE, APPEARS TO BE JUST FROM THIN WRINKLED SKIN AND HOW SHE WAS
LAYING FOR PROCEDURE, PT DENIES ANY PAIN WITH IT AND SKIN IS INTACT, WILL
CONTINUE TO MONITER IT. ALEXANDER DRAINING TO GRAVITY. FAMILY SURROUNDING BEDSIDE.
NO CURRENT NEEDS AT THIS TIME. WILL CTM.

## 2019-01-23 NOTE — MOR[facility]ARE
CASE MANAGEMENT DISCHARGE SUMMARY
 
 
PATIENT: CHANTAL TIPTON                        UNIT: [id number]
ACCOUNT#: [id number]                       ADM DATE: 19
AGE: 88     : [birth year]  SEX: F            ROOM/BED: D.2112    
AUTHOR: YENNYDOC                             PHYSICIAN:                               
 
REFERRING PHYSICIAN: SHARATH TORRES MD               
DATE OF SERVICE: 19
Discharge Plan
 
 
Patient Name: CHANTAL TIPTON
Facility: [facility]
Encounter #: [id number]
Medical Record #: [id number]
: 1931
Planned Disposition: Home with Home Health
Anticipated Discharge Date: 19
 
Discharge Date: 
Expected LOS: 21
Initial Reviewer: [id number]
Initial Review Date: 2019
Generated: 19   1:10 pm 
Comments
 
DCP- Discharge Planning
 
Updated by [id number]: Albaro Chambers on 19  11:07 am CT
Patient Name:  CHANTAL TIPTON   
Encounter No:  [id number]   
:  1931   
Primary Insurance:  HU[employer]NA CHOICE PPO MCR ADVANT  
Anticipated DC Date: 2019   
Planned Disposition:  Home with Home Health  
External Planned Provider: [facility]  
  
  
DCP follow-up note: CM SPOKE TO PT IN ROOM THIS MORNING, PT REPORTS SHE IS 
HAVING KYPHOPLASTY TODAY BEFORE GOING HOME.  CM INFORMED PT THAT QUOTE FOR 
TUBE FEEDING MATERIALS RECEIVED, CM PROVIDED TO PT.  PT REPORTS SHE WILL 
NOTIFY GINGER THIS MORNING WHEN SHE GETS HERE.  PT DENIES NEEDS.    
  
WHITNEY SPOKE TO AMARI BERMUDEZ, [phone], IN PERSON AT NURSES STATION.  GINGER 
REPORTS [facility] DELIVERY OF THE HOSPITAL BED AND 
BEDSIDE COMMODE TODAY.  SHE HAS NOT HEARD FROM [facility] ABOUT TUBE FEEDING; 
SHE DID RECEIVE THE QUOTE FOR PRIVATE PAY AND WANTS TO MAKE THE ARRAGNEMENTS. 
 GINGER HAS NOT CALLED [facility] HERSELF.  CM CALLED [facility], [phone], NOTIFED DAVIN THAT PT'S FAMILY WANTS TO PRIVATE 
PAY THE PUMP AND FEEDING SUPPLIES AND ASKED HER TO CALL JOHNNIE AS SOON AS 
POSSIBLE AND MAKE ARRANGEMENTS FOR DELIVERY TODAY.  MALATHI REPORTS MATERIALS 
AND SUPPLIES ARE CASH ON DELIVERY AND SHE WILL CALL GINGER IMMEDIATELY TO 
MAKE DELIVERY AND EDUCATION ARRANGEMENTS.  
  
NOTIFY [facility], [phone], OF DISCHARGE.  FAX DISCHARGE TO [facility], [phone].  
  
  
Albaro Chambers, CASE MANAGEMENT
DCP- Discharge Planning
 
Updated by [id number]: Albaro Chambers on 19   3:06 pm CT
Patient Name:  CHANTAL TIPTON   
Encounter No:  [id number]   
:  1931   
Primary Insurance:  HUMANA CHOICE PPO MCR ADVANT  
Anticipated DC Date: 2019   
Planned Disposition:  Home with Home Health  
External Planned Provider: [facility]  
  
  
DCP follow-up note: CM CALLED DWAINE AT [facility], [phone], PROVIDED 
INFORMATION FOR HOSPITAL BED AND BEDSIDE COMMODE, INFORMED OF PLANNED 
DISCHARGE TOMORROW.  CM FAXED ORDER AND REFERRAL INFORMATION TO [facility] AT 
[phone].  [facility] TO ARRANGE DELIVERY OF MEDICAL EQUIPMENT WITH PT'S 
FAMILY.  
  
[facility], [phone], SPOKE TO MARGARET 
REGARDING TUBE FEEDING ORDER; MARGARET ADVISED THAT PT'S INSURANCE WILL NOT PAY 
FOR SUPPLEMENTAL NUTRITION AS PT IS ON REGULAR DIET AND ABLE TO EAT BY MOUTH. 
 MARGARET WILL REVIEW REFERRAL TO SEE IF THERE IS SOME WAY THAT INSURANCE MAY 
COVER THE SERVICE.  CM SPOKE TO PT IN ROOM, INFORMED OF ABOVE; PT ASKED FOR 
CM TO GET CASH PAY PRICE FOR TUBE FEEDING AND DISCUSS WITH GINGER, HER NIECE. 
 CM FAXED REFERRAL TO [facility] [phone].  
  
CM CALLED [facility], [phone], SPOKE TO FILI, PROVIDED 
REFERRAL INFORMATION AND ADVISED OF PLANNED DISCHARGE 19.  THEY WILL 
ACCEPT PT FOR ADMIT ON 19.  CM FAXED HOME HEALTH REFERRAL TO [facility] AT 
[phone].  CM SPOKE TO GINGER MILLS IN HALLWAY AS DIRECTED BY PT; 
INFORMED OF ABOVE ARRANGEMENTS.  CM SPOKE TO BEDSIDE NURSE REGARDING FAMILY 
CONCERN OF OXYGEN NEED, BEDSIDE NURSE INFORMED CM THAT SHE WOULD ATTEMPT 
OXYGEN WEANING.  
  
[facility] ARRANGING HOSPITAL BED AND BEDSIDE COMMODE WITH FAMILY.
  PT'S INSURANCE WILL NOT PAY FOR TUBE FEEDING MATERIALS OR SUPPLIES - 
[facility] GETTING QUOTE FOR CASH PAY.  [facility] 
[facility] WILL ACCEPT PT AT DISCHARGE.    
  
FOR DISCHARGE, NOTIFY [facility] [phone], FAX DISCHARGE 
INFORMATION TO [employer] [phone].  CM TO CONTINUE TO FOLLOW AND ASSIST AS 
 
NEEDED.  
  
  
Albaro Kcwell, NEERU MELENDEZ  
  
Appended by Albaro Chambers on 2019 16:06 CST:  
CM SPOKE TO PT'S GREAT NIECE IN LAW WHO INFORMED CM THAT [facility] HAS CALLED 
TO ARRANGE DELIVERY OF HOSPITAL BED AND BEDSIDE COMMODE.  SHE HAS NOT HEARD 
FROM [facility] REGARDING TUBE FEEDING.  CM CALLED [facility], [phone], SPOKE TO DAVIN WHO REPORTS INSURANCE 
WILL NOT COVER COSTS OF TUBE FEEDING AND PROVIDED QUOTE OVER THE PHONE AND 
WILL FAX QUOTE TO CM FOR PT'S FAMILY.  BEDSIDE NURSE NOTIFIED.  PT'S FAMILY 
ARE NOT IN ROOM, CM LEFT NOTE ON WHITE BOARD FOR FAMILY NOTIFYING THAT TUBE 
FEEDING COSTS ARE NOT COVERED BY INSURANCE WITH QUOTED PRICES.  
  
[facility] ARRANGING HOSPITAL BED AND BEDSIDE COMMODE WITH FAMILY.
  PT'S INSURANCE WILL NOT PAY FOR TUBE FEEDING MATERIALS OR SUPPLIES - 
[facility] QUOTED FOR CASH PAY OF $8 PER DAY FOR 
PUMP AND SUPPLIES / $1.50 PER CAN FOR JEVITY 1.2.  [facility] WILL 
ACCEPT PT AT DISCHARGE.    
  
FOR DISCHARGE, NOTIFY [facility] [phone], FAX DISCHARGE 
INFORMATION TO [employer] -[phone].  CM TO CONTINUE TO FOLLOW AND ASSIST AS 
NEEDED.  
  
  
NEERU Oro
DCP- Discharge Planning
 
Updated by [id number]: Albaro Chambers on 19   4:27 pm CT
Patient Name:  CHANTAL TIPTON   
Encounter No:  [id number]   
:  1931   
Primary Insurance:  HUMANA CHOICE PPO MCR ADVANT  
Anticipated DC Date: 2019   
Planned Disposition:  HOME WITH HOME HEALTH  
[facility] CM RECEIVED ORDER FOR HOME HEALTH, TUBE FEEDING AND 
HOSPITAL BED.  CM MET WITH PT AND GRAND NEPHEW'S SPOUSE IN ROOM.  CM BEGAN 
DISCUSSING ORDER, PT'S GRAND NEPHEW'S SPOUSE INFORMED CM THAT PT PLANS TO 
DISCHARGE TO [facility] FOR REHAB AND ASKED IF CM HAS HEARD FROM [facility] AS SHE DID NOT SEE THEM FRIDAY AS THEY WERE SUPPOSED TO EVALUATE PT 
THEN.  CM CALLED [facility], SPOKE TO LATONYA WHO WILL 
CHECK WITH MIRANDA TO FIND OUT WHAT HAS HAPPENED.  LATONYA WILL HAVE MIRANDA CALL 
CM AS SOON AS POSSIBLE.    
CM MET WITH PT, PT'S FIJUVENTINO AND PT'S GRAND NEPHEW'S NIECE.  PT'S ELIAS 
INFORMED CM THAT CM DOES NOT NEED TO SEE THEM AND THAT DR. LEPE HAS 
TAKEN CARE OF EVERYTHING. CM ASKED WHAT HAD BEEN WORKED OUT.  CM WAS ADVISED 
THAT PT WILL HAVE THE KYPHOPLASTY ON WEDNESDAY AND THEN GO TO [facility] 
FOR REHAB, DR. LEPE WILL TALK TO MIRANDA AT [facility] AND WORK THIS 
 
OUT.  CM NOTIFIED LIZZY EMANUEL AND DR. LEPE.    
  
CM RECEIVED CALL BACK FROM MIRANDA WHO SPOKE TO FAMILY, INFORMED THEM THAT PT 
IS NOT APPROPRIATE CANDIDATE FOR REHAB AS SHE IS REFUSING THERAPY SERICES AND 
 INFORMED CM THAT FAMILY HAS REFUSED LONG TERM CARE PLACEMENT AT [facility].    
  
CM MET WITH PT AND PT'S GRAND NEPHEWS SPOUSE IN ROOM.  WHITNEY DISCUSSED IMPORTANT 
MESSAGE FROM MEDICARE AND DISCUSSED HOW TO COMPLAIN IF NEEDED TO HOUSE 
SUPERVISOR, ADMINISTRATION AND IF NEEDED, QUALITY IMPROVEMENT OFFICE FOR 
MEDICARE.  GINGER INFORMED CM THAT SHE HAS COMPLAINED TO THE DOCTORS BUT HAS 
BEEN THINKING SHE NEEDS TO COMPLAIN TO SOMEONE ELSE. CM DISCUSSED DISCHARGE 
PLAN.  GINGER HAS TALKED TO PT, PT'S ELIAS AND MIRANDA AT [facility].  THEY 
ARE NOT GOING TO PUT PT INTO LONG TERM CARE AND WILL BE TAKING PT HOME AS 
DISCUSSED LAST WEEK; GINGER STATES PT IS NOT READY FOR HOSPICE BUT WANTS HOME 
HEALTH.  HOME HEALTH LISTING PROVIDED AND DISCUSSED, JOHNNIEDENNIS WANTS TO USE THE 
HIGHEST RATED COMPANY, PT TOLD GINGER TO SIGN THE FORMS.  GINGER SIGNED THE 
IMPORTANT MESSAGE FROM MEDICARE AND CHOICE FOR [facility].  GINGER 
STATES THAT THEY WILL NEED A HOSPITAL BED, BEDSIDE COMMODE, OXYGEN AND TUBE 
FEEDING SET UP FOR PT AT HOME. WHITNEY EXPLAINED THAT PT'S INSURANCE HAS TO PRE 
AUTHORIZE ALL MEDICAL EQUIPMENT AND THAT SINCE PT IS ABLE TO EAT, INSURANCE 
MAY NOT COVER TUBE FEEDING.  THEY HAVE NO PREFERENCE ON PROVIDER AND GINGER 
ASKED TO BE CONTACTED REGARDING ANY COPAYS FOR EQUIPMENT [phone].  
  
CM HAS ORDER FOR HOME HEALTH, HOSPITAL BED AND TUBE FEEDING.  CM WILL NEED 
ADDITIONAL ORDERS FOR BEDSIDE COMMODE AND OXYGEN TESTING.    
  
CM TO FIND IN NETWORK PROVIDER FOR PT'S INSURANCE TO ARRANGE NEEDED MEDICAL 
EQUIPMENT AS SOON AS POSSIBLE.  CM TO COMPLETE HOME HEALTH ARRANGEMENTS WITH 
Attributor.  
  
Albaro Chambers
DCP- Discharge Planning
 
Updated by [id number]: Marli Vickers on 19  11:53 am CT
RECEIVED NOTICE THAT THE PATIENTS FAMILY WAS WANTING HER TO HAVE THE 
KYPHOPLASTY BEFORE SHE LEAVES THE HOSPITAL. IT WAS MENTIONED THAT THE 
PATIENTS POA HAS NOT BEEN HER AND THERE IS NO POA PAPERWORK IN THE CHART. I 
ASKED THE BEDSIDE NURSE PENG ENCARNACION TO COME INTO THE ROOM WITH ME TO DISCUSS 
THE NEED FOR POA PAPERWORK IN ORDER TO HAVE ANY CONSENTS FOR PROCEDURES 
SIGNED SINCE THE PATIENT WAS NOT ABLE TO SIGN HERSELF, AND BY THE HIERACHY OF 
LAW, THE POA OR NEXT OF KIN WOULD BE WHO NEEDED TO SIGN AND SINCE THEY (THE 
LADY AND ELIAS) WERE NOT BLOOD RELATED, THEY COULD NOT SIGN FOR HER AS THEY 
HAVE BEEN. AT THIS TIME I WAS QUICKLY TOLD THAT SHE WAS IN HER RIGHT MIND AND 
ABLE TO MAKE ALL HER OWN DECISIONS. THE LADY AT BEDSIDE PROCEEDED TO TELL ME 
ABOUT ALL THE TIMES PEOPLE CAME IN AND QUESTIONED HER ABOUT THE DATE, TIME, 
PRESIDENT, ETC AND SHE WAS ABLE TO ALWAYS ANSWER ALL THEIR QUESTIONS. SHE 
STATED THE PATIENT WAS ABLE TO SIGN FOR HERSELF. UP TO THIS POINT, THE 
PATIENT HAD NOT OPENED HER EYES AND HAD NOT SAID ANYTHING. IT TOOK ME 
TOUCHING THE PATIENT'S ARM TO GET HER TO OPEN HER EYES AND ACKNOWLEDGE MY 
PRESENCE AND ANSWER MY QUESTIONS. THE PATIENT IS WITH IT AND ABLE TO ANSWER 
QUESTIONS. AT THIS POINT I ASKED HER IF SHE WANTED TO HAVE THE KYPHOPLASTY 
 
DONE. SHE STATED YES. THE LADY AT BEDSIDE BECAME VERY AGITATED STATING THAT 
SHE KNEW ABOUT HIPPA LAWS AND WANTED TO KNOW WHY ANYONE EVEN ALLOWED THINGS 
TO HAPPEN UP UNTIL NOW. NO ONE HAD ASKED FOR ANYONE'S DRIVERS LICENES TO 
VERIFY THEY WERE WHO THEY SAID THEY WERE. I TRIED TO EXPLAINE THAT EVEN WITH 
THAT, WE HAD NO WAY TO ACTUALLY VERIFY THAT WITH A DRIVERS LICENSE BECAUSE 
ANYONE CAN SAY THEY ARE SOMEONES FAMILY AND WE DON'T KNOW OTHERWISE UNLESS 
THE PATIENT SAYS SO, BUT SHE CUT ME OFF AS I WAS TALKING. I STOOD AT BEDSIDE 
AND LISTENED TO HER RANT ABOUT ALL THE THINGS THAT SHE HAS WITNESSED HERE AT 
THIS HOSPITAL THAT HAS MADE HER SICK TO HER STOMACH ESPECIALLY ABOUT THE FACT 
THAT SHE CAME IN HERE WITH A HURT BACK AND NOW THEY ARE JUST WATCHING HER DIE.
 I APOLOGIZED TO HER ABOUT ALL OF THE THINGS THAT [facility] WAS UPSET ABOUT, AND 
THEN DIRECTED MY CONVERSATION TO THE PATIENT, EXPLAINING THAT IF SHE WANTED 
THEY KYPHOPLASTY AND HER URINE WAS CLEAR THAT I WOULD EXPLAIN THIS TO THE 
NURSE PRACTITIONER. THAT ULTIMATELY I AM HERE TO TAKE CARE OF HER AND MAKE 
SURE WE ARE DOING WHAT SHE WANTS. I ALSO EXPLAINED TO HER THAT SHE NEEDS TO 
SIGN ALL OF HER OWN PAPERWORK, EVEN IF IT IS JUST WITH AN "X". THAT WE SIGN 
AS WITNESSES TO STATE WE KNOW SHE UNDERSTANDS AND SHE IS SIGNING. EXPLAINED 
THAT AS LONG AS SHE IS ABLE TO MAKE ALL HER OWN DECISIONS, WE DID NOT NEED 
HER POA PAPERWORK. SHE IS IN AGREEMENT. THE GREAT NEPHEWS WIFE WHO IS AT 
BEDSIDE CONTINUED WITH HER RANT, I AGAIN APOLOGIZED AND THE BEDSIDE NURSE AND 
I LEFT THE ROOM. I EXPLAINED TO SCOTTIE THAT THE PATIENT WAS CURRENTLY IN HER 
RIGHT MIND AND SHE IS WANTING THEY KYPHOPLASTY, AND SHE RECONSULTED IR TO SEE 
THE PATIENT.
DCP- Discharge Planning
 
Updated by [id number]: Melany Olivares on 19   4:25 pm CT
CM RECEIVED AN ORDER REGARDING PLANS TO DISCHARGE TO HOME W/ HOSPITAL BED , 
TUBE FEEDING AND HOME HEALTH. PATIENT'S NUTRITION NOTE IS 19. WILL NEED 
NUTRITION NOTE REGARDING PATIENT NEEDS FOR DISCHARGE. CM UNDERSTANDS THE 
PATIENT DOES EAT AND TAKE HER MEDICATIONS BY MOUTH. SHE IS PRESENTLY ON 
FEEDINGS VIA  PUMP. SHE HAS BEEN ADVANCED TO 60 CC/HR TODAY WHICH IS HER GOAL 
RATE.  
SPOKE WITH PRIMARY ELIUD AND SHE REPORTS NO RESIDUAL AT THIS TIME.  
WILL NEED TO HAVE [facility] COMPANY PROVIDE BED AND SPECIALTY MATTRESS  FOR HOME 
AND   
DETERMINE COVERAGE FOR TUBE FEEDING AND EQUIPMENT.  
CM TO FOLLOW UP IN AM.
DCP- Discharge Planning
 
Updated by [id number]: Albaro Chambers on 19   7:14 am CT
Patient Name:  CHANTAL TIPTON   
Encounter No:  [id number]   
:  1931   
Primary Insurance:  HUMANA CHOICE PPO MCR ADVANT  
Anticipated DC Date: 2019   
Planned Disposition:  Skilled Nursing Facility  
External Planned Provider: [facility], MEDICARE REHAB BED 
  
  
DCP follow-up note: CM FAXED UPDATE TO [facility], 
[phone] FOR REHAB REQUEST.  
  
 
CM WAITING ADMISSION DETERMINATION FROM [facility] FOR 
REHAB PLACEMENT.  
  
ALBARO CHAMBERS, CASE MANAGEMENT
DCP- Discharge Planning
 
Updated by [id number]: Albaro Chambers on 19   3:46 pm CT
Patient Name:  CHANTAL TIPTON   
Encounter No:  [id number]   
:  1931   
Primary Insurance:  HUMANA CHOICE PPO MCR ADVANT  
Anticipated DC Date: 2019   
Planned Disposition:  Skilled Nursing Facility  
External Planned Provider:  [facility], MEDICARE REHAB 
BED  
  
  
DCP follow-up note: CM MET WITH PT'S TIANNA MCKEON BY MARRIAGE, AMARI BERMUDEZ, 
[phone].  GINGER STATES THAT JULIANNE BERMUDEZ IS PT'S FIANCE.  PT HAS POWER 
OF  WITH GREAT NEPHEW, GINGERS SPOUSE, MAGED BERMUDEZ, WHO WORKS IN 
[place].  GINGER WILL GET MAGED TO HOSPITAL ALONG WITH POWER OF  
PAPERWORK AS SOON AS POSSIBLE.  THEY ARE IN AGREEMENT WITH REHAB AT [facility], BUT [facility] MAY NOT ACCEPT AS PT HAS CONTINUED DECLINE.  [facility] TO COME TOMORROW TO EVALUATE PT FOR REHAB.  IF [facility] DOES 
NOT ACCEPT FOR REHAB, THEY ARE THINKING TO TAKE PT HOME TO [facility] HERE 
IN [place] AND GINGER ALONG WITH FAMILY WILL CARE FOR PT WITH HOME 
HOSPICE.  GINGER REPORTS THEY WILL MAKE THAT DECISION TOMORROW.  CM EXPLAINED 
THAT THE POWER OF  WILL HAVE TO BE IN AGREEMENT AND SIGN ANY NEEDED 
AUTHORIZATIONS FOR ENROLLMENT IN REHAB OR HOSPICE CARE.  GINGER REPORTS 
UNDERSTANDING, PROVIDED CELL PHONE NUMBER FOR MAGED BERMUDEZ, [phone].  
  
CM WAITING ADMISSION DETERMINATION FROM [facility] FOR 
REHAB PLACEMENT.  
  
ALBARO CHAMBERS, CASE MANAGEMENT
 
DCP- Discharge Planning
 
Updated by [id number]: Albaro Chambers on 19   3:34 pm CT
Patient Name:  CHANTAL TIPTON   
Encounter No:  [id number]   
:  1931   
Primary Insurance:  HUMANA CHOICE PPO MCR ADVANT  
Anticipated DC Date: 2019   
Planned Disposition:  Skilled Nursing Facility  
External Planned Provider: [facility], MEDICARE REHAB BED 
  
  
DCP follow-up note: CM MET WITH PT AND SON/POA, JULIANNE BERMUDEZ, AT FAMILY 
REQUEST.  MR. BERMUDEZ REPORTS IT IS NOW TIME TO SEND REFERRAL TO [facility] 
FOR REHAB PLACEMENT.  IMPORTANT MESSAGE FROM MEDICARE PROVIDED AND EXPLAINED. 
  
 
[phone]   
  
CM WAITING ADMISSION DETERMINATION FROM [facility] FOR 
REHAB PLACEMENT.  
  
ALBARO CHAMBERS, CASE MANAGEMENT
DCP- Discharge Planning
 
Updated by [id number]: Albaro Chambers on 19   4:52 pm CT
Patient Name: CHANTAL TIPTON                                     
Admission Status: Elective   
Accout number: [id number]                              
Admission Date: 2019   
: 1931                                                        
Admission Diagnosis:ACUTE KIDNEY FAILURE, UNSPECIFIED   
Attending: SHARATH TORRES                                                
Current LOS:  2   
  
Anticipated DC Date:    
Planned Disposition: Skilled Nursing Facility   
Primary Insurance: HUMANA CHOICE PPO MCR ADVANT   
PLANNED EXTERNAL PROVIDER:  [facility] AND REHAB, MEDICARE REHAB 
BED  
  
Discharge Planning Comments:   
CM RECEIVED ORDER INDICATING PT WANTS NURSING HOME CARE.  CM MET WITH PT AND 
SON IN ROOM TO DISCUSS DISCHARGE PLANNING AND NEEDS. PT REPORTS THAT BEFORE 
GETTING SICK, SHE WAS LIVING AT HOME WITH HER SON, INDEPENDENT IN HER CARE.  
PT REPORTS SHE DID TELL THE DOCTOR THAT SHE WOULD BE BETTER OFF IN A NURSING 
HOME BEFORE FAMILY ARRIVED TODAY.  PT WANTS REHAB, NOT LONG TERM CARE.   PT 
HAS NO MEDICAL EQUIPMENT AND NO OUTSIDE SERVICES ASSISTING IN THE HOME. CM 
DISCUSSED AVAILABILITY OF HOME HEALTH, REHAB SERVICES AND MEDICAL EQUIPMENT. 
PT'S SON INITIALLY DECLINED TO PARTICIPATE IN DISCHARGE PLANNING REPORTING PT 
WAS SENT TO REHAB DOWNSTAIRS TOO SOON WHEN SHE WAS NOT ABLE TO WALK OR EVEN 
FEED HERSELF. SON WANTS PT CONSIDERED FOR INPATIENT REHAB AGAIN STATING THEY 
SENT HER BACK HERE BECAUSE SHE USED ALL OF HER DAYS DOWN THERE.  PT DOES NOT 
THINK SHE CAN PARTICIPATE IN THREE HOURS OF PROGRESSIVE THERAPY PER DAY.  CM 
EXPLAINED THAT IF PT IS NOT ABLE TO PARTICIPATE IN THE THREE HOURS OF 
PROGRESSIVE THERAPY, IS NOT ABLE TO STAND OR FEED HERSELF, SHE IS NOT GOING 
TO BE APPROPRIATE FOR READMISSION TO INPATENT REHAB.  CM DISCUSSED 
AVAILABILITY OF SKILLED NURSING REHAB SERVICES AND PROVIDED CHOICE FORM.  
PT'S SON REPORTS HE ALREADY SIGNED ONE OF THESE FOR [facility] THE LAST 
VISIT AND SPEAKING WITH THIS CM.  CM EXPLAINED A NEW ONE WAS NEEDED.  PT'S 
SON SIGNED THE FORM AND DOES NOT WANT PT SENT TO REHAB BEFORE SHE IS STABLE.  
CM EXPLAINED THAT ALL DOCTORS WOULD HAVE TO INDICATE PT IS READY TO DISCHARGE 
AND CM IS ONLY WANTING TO BE PROACTIVE ON PT'S BEHALF FOR DISCHARGE PLANNING 
AND SECURE REHAB BED AHEAD OF TIME TO ENSURE THAT PT IS ABLE TO GET INTO [facility] WHEN DISCHARGED AS [facility] IS POPULAR REHAB AND DOES FILL UP.  
PT'S SON WILL WANTS PT TO BE STABLE FOR DISCHARGE FOR CM TO SEND REFERRALS.  
CHOICE SIGNED. CM PROVIDED PT'S SON WITH CM CONTACT INFORMATION.  
  
CM TO SEND REFERRAL TO [facility] WHEN PROJECTED 
 
DISCHARGE DATE IS KNOWN, AS PT'S SON DOES NOT WANT REHAB REFERRAL SENT OUT 
PRIOR TO PT'S MEDICAL STABILITY FOR DISCHARGE TO REHAB.  
  
: Albaro Chambers
 
 DCPIA - Discharge Planning Initial Assessment
 
Updated by [id number]: Albaro Chambers on 19   1:21 pm
*  Is the patient Alert and Oriented?
Yes
*  How many steps to enter\exit or inside your home? NONE *  PCP DR. MCCALL *  Pharmacy
[facility]
*  Preadmission Environment
Acute Inpatient Rehab
*  Facility Name
[facility] INPAT[facility] REH[facility]
*  ADLs
Total Dependent
*  Equipment
None
*  Other Equipment
NO MEDICAL EQUIPMENT PROVIDER PREFERECE
*  List name and contact numbers for known caregivers / representatives who 
currently or will assist patient after discharge:
JULIANNE ELIAS BERMUDEZ [phone]
*  Verbal permission to speak to the caregivers and representatives has been 
obtained from the patient.
Yes
*  Community resources currently utilized
None
*  Please name any agencies selected above.
NONE
*  Additional services required to return to the preadmission environment?
Yes
*  Can the patient safely return to the preadmission environment?
Yes
*  Has this patient been hospitalized within the prior 30 days at any 
hospital?
Yes
 
 
 
 
 
Coverage Notice
 
Reviewer: [id number] Chambers
 
Notice Issued Date-Time: 2019  13:00
Notice Type: Patient Choice Letter
 
Notice Delivered To: Family Member
 
Relationship to Patient: Other Relationship
Representative Name: JULIANNE BERMUDEZ
 
Delivery Method: HAND - Hand Delivered
Ariadne Days:
Prior Verbal Notification: 
 
Recipient Understood Notice: Yes
Recipient Signature: Yes
Med Rec Note Co-signed by Attending:
 
Coverage Notice Comment:  [facility]
Reviewer: [email or web address] Gopi Chambers
 
Notice Issued Date-Time: 2019  12:20
Notice Type: IM Discharge Notice
 
Notice Delivered To: Family Member
Relationship to Patient: Other Relationship
Representative Name: JULIANNE BERMUDEZ
 
Delivery Method: HAND - Hand Delivered
Ariadne Days:
Prior Verbal Notification: 
 
Recipient Understood Notice: Yes
Recipient Signature: Yes
Med Rec Note Co-signed by Attending:
 
Coverage Notice Comment:  
Reviewer: [email or web address] Gopi Chambers
 
Notice Issued Date-Time: 2019  16:20
Notice Type: IM Discharge Notice
 
Notice Delivered To: Family Member
Relationship to Patient: Other Relationship
Representative Name: AMARI BERMUDEZ
 
Delivery Method: HAND - Hand Delivered
Ariadne Days:
Prior Verbal Notification: 
 
Recipient Understood Notice: Yes
Recipient Signature: Yes
Med Rec Note Co-signed by Attending:
 
Coverage Notice Comment:  
Reviewer: [email or web address] Gopi Chambers
 
Notice Issued Date-Time: 2019  16:20
Notice Type: Patient Choice Letter
 
 
Notice Delivered To: Family Member
Relationship to Patient: Other Relationship
Representative Name: AMARI BERMUDEZ
 
Delivery Method: HAND - Hand Delivered
Ariadne Days:
Prior Verbal Notification: 
 
Recipient Understood Notice: Yes
Recipient Signature: Yes
Med Rec Note Co-signed by Attending:
 
Coverage Notice Comment:  [facility]
 
Last DP export: 19   3:09 p
Patient Name: CHANTAL TIPTON
Encounter #: [id number]
Page [id number]
 
 
 
 
 
Electronically Signed by CHELE RAMON on 19 at 1210
 
 
 
 
 
 
**All edits/amendments must be made on the electronic document**
 
DICTATION DATE: 19 1210     : KARY  19 1210     
RPT#: [id number]                                DC DATE:        
                                               STATUS: ADM IN  
[facility]
[address] [place] [address]
***END OF REPORT***

## 2019-01-24 ENCOUNTER — HOSPITAL ENCOUNTER (INPATIENT)
Dept: HOSPITAL 84 - D.ER | Age: 84
LOS: 1 days | Discharge: HOSPICE | DRG: 186 | End: 2019-01-25
Attending: FAMILY MEDICINE | Admitting: FAMILY MEDICINE
Payer: MEDICARE

## 2019-01-24 VITALS
WEIGHT: 144 LBS | HEIGHT: 65 IN | BODY MASS INDEX: 23.99 KG/M2 | HEIGHT: 65 IN | BODY MASS INDEX: 23.99 KG/M2 | BODY MASS INDEX: 23.99 KG/M2 | WEIGHT: 144 LBS | BODY MASS INDEX: 23.99 KG/M2

## 2019-01-24 VITALS — DIASTOLIC BLOOD PRESSURE: 53 MMHG | SYSTOLIC BLOOD PRESSURE: 116 MMHG

## 2019-01-24 VITALS — DIASTOLIC BLOOD PRESSURE: 59 MMHG | SYSTOLIC BLOOD PRESSURE: 146 MMHG

## 2019-01-24 VITALS — DIASTOLIC BLOOD PRESSURE: 63 MMHG | SYSTOLIC BLOOD PRESSURE: 169 MMHG

## 2019-01-24 DIAGNOSIS — I10: ICD-10-CM

## 2019-01-24 DIAGNOSIS — R53.2: ICD-10-CM

## 2019-01-24 DIAGNOSIS — I48.91: ICD-10-CM

## 2019-01-24 DIAGNOSIS — J90: Primary | ICD-10-CM

## 2019-01-24 DIAGNOSIS — E11.9: ICD-10-CM

## 2019-01-24 LAB
ALBUMIN SERPL-MCNC: 2 G/DL (ref 3.4–5)
ALP SERPL-CCNC: 133 U/L (ref 46–116)
ALT SERPL-CCNC: 18 U/L (ref 10–68)
ANION GAP SERPL CALC-SCNC: 10.2 MMOL/L (ref 8–16)
ANION GAP SERPL CALC-SCNC: 11.5 MMOL/L (ref 8–16)
BASOPHILS NFR BLD AUTO: 0.3 % (ref 0–2)
BASOPHILS NFR BLD AUTO: 0.6 % (ref 0–2)
BILIRUB SERPL-MCNC: 0.47 MG/DL (ref 0.2–1.3)
BUN SERPL-MCNC: 30 MG/DL (ref 7–18)
BUN SERPL-MCNC: 32 MG/DL (ref 7–18)
CALCIUM SERPL-MCNC: 7.6 MG/DL (ref 8.5–10.1)
CALCIUM SERPL-MCNC: 7.9 MG/DL (ref 8.5–10.1)
CHLORIDE SERPL-SCNC: 101 MMOL/L (ref 98–107)
CHLORIDE SERPL-SCNC: 98 MMOL/L (ref 98–107)
CK SERPL-CCNC: 77 UL (ref 21–215)
CO2 SERPL-SCNC: 30.5 MMOL/L (ref 21–32)
CO2 SERPL-SCNC: 31.2 MMOL/L (ref 21–32)
CREAT SERPL-MCNC: 1.4 MG/DL (ref 0.6–1.3)
CREAT SERPL-MCNC: 1.5 MG/DL (ref 0.6–1.3)
EOSINOPHIL NFR BLD: 2.2 % (ref 0–7)
EOSINOPHIL NFR BLD: 2.7 % (ref 0–7)
ERYTHROCYTE [DISTWIDTH] IN BLOOD BY AUTOMATED COUNT: 16.5 % (ref 11.5–14.5)
ERYTHROCYTE [DISTWIDTH] IN BLOOD BY AUTOMATED COUNT: 17 % (ref 11.5–14.5)
GLOBULIN SER-MCNC: 3.9 G/L
GLUCOSE SERPL-MCNC: 109 MG/DL (ref 74–106)
GLUCOSE SERPL-MCNC: 115 MG/DL (ref 74–106)
HCT VFR BLD CALC: 24.9 % (ref 36–48)
HCT VFR BLD CALC: 30.5 % (ref 36–48)
HGB BLD-MCNC: 7.8 G/DL (ref 12–16)
HGB BLD-MCNC: 9.7 G/DL (ref 12–16)
IMM GRANULOCYTES NFR BLD: 0.4 % (ref 0–5)
IMM GRANULOCYTES NFR BLD: 0.6 % (ref 0–5)
LIPASE SERPL-CCNC: 733 U/L (ref 73–393)
LYMPHOCYTES NFR BLD AUTO: 10.8 % (ref 15–50)
LYMPHOCYTES NFR BLD AUTO: 11.2 % (ref 15–50)
MAGNESIUM SERPL-MCNC: 2.2 MG/DL (ref 1.8–2.4)
MCH RBC QN AUTO: 31.5 PG (ref 26–34)
MCH RBC QN AUTO: 31.8 PG (ref 26–34)
MCHC RBC AUTO-ENTMCNC: 31.3 G/DL (ref 31–37)
MCHC RBC AUTO-ENTMCNC: 31.8 G/DL (ref 31–37)
MCV RBC: 100 FL (ref 80–100)
MCV RBC: 100.4 FL (ref 80–100)
MONOCYTES NFR BLD: 10.9 % (ref 2–11)
MONOCYTES NFR BLD: 14.2 % (ref 2–11)
NEUTROPHILS NFR BLD AUTO: 71.3 % (ref 40–80)
NEUTROPHILS NFR BLD AUTO: 74.8 % (ref 40–80)
OSMOLALITY SERPL CALC.SUM OF ELEC: 278 MOSM/KG (ref 275–300)
OSMOLALITY SERPL CALC.SUM OF ELEC: 281 MOSM/KG (ref 275–300)
PLATELET # BLD: 355 10X3/UL (ref 130–400)
PLATELET # BLD: 395 10X3/UL (ref 130–400)
PMV BLD AUTO: 10.1 FL (ref 7.4–10.4)
PMV BLD AUTO: 9.6 FL (ref 7.4–10.4)
POTASSIUM SERPL-SCNC: 4.7 MMOL/L (ref 3.5–5.1)
POTASSIUM SERPL-SCNC: 4.7 MMOL/L (ref 3.5–5.1)
PROT SERPL-MCNC: 5.9 G/DL (ref 6.4–8.2)
RBC # BLD AUTO: 2.48 10X6/UL (ref 4–5.4)
RBC # BLD AUTO: 3.05 10X6/UL (ref 4–5.4)
SODIUM SERPL-SCNC: 136 MMOL/L (ref 136–145)
SODIUM SERPL-SCNC: 137 MMOL/L (ref 136–145)
TROPONIN I SERPL-MCNC: 0.02 NG/ML (ref 0–0.06)
WBC # BLD AUTO: 10.4 10X3/UL (ref 4.8–10.8)
WBC # BLD AUTO: 7.1 10X3/UL (ref 4.8–10.8)

## 2019-01-24 NOTE — M[facility]ARE
CASE MANAGEMENT DISCHARGE SUMMARY
 
 
PATIENT: CHANTAL TIPTON                        UNIT: [id number]
ACCOUNT#: [id number]                       ADM DATE: 19
AGE: 88     : [birth year]  SEX: F            ROOM/BED: D.2112    
AUTHOR: YENNY,DOC                             PHYSICIAN:                               
 
REFERRING PHYSICIAN: SHARATH TORRES MD               
DATE OF SERVICE: 19
Discharge Plan
 
 
Patient Name: CHANTAL TIPTON
Facility: [facility]:[place]
Encounter #: [id number]
Medical Record #: [id number]
: 1931
Planned Disposition: Home with Home Health
Anticipated Discharge Date: 19
 
Discharge Date: 
Expected LOS: 22
Initial Reviewer: [id number]
Initial Review Date: 2019
Generated: 19   8:56 am 
Comments
 
DCP- Discharge Planning
 
Updated by [id number]: Albaro Chambers on 19   6:55 am CT
Patient Name:  CHANTAL TIPTON   
Encounter No:  [id number]   
:  1931   
Primary Insurance:  HUMANA CHOICE PPO MCR ADVANT  
Anticipated DC Date: 2019   
Planned Disposition:  Home with Home Health  
External Planned Provider: [facility]  
  
  
DCP follow-up note: CM REVIEWED CHART, PT DID NOT DISCHARGE HOME YESTERDAY.  
CM FAXED UPDATE TO [facility] [phone].    
  
NOTIFY [facility], [phone], OF DISCHARGE.  FAX DISCHARGE TO [facility] 
AT, [phone].  
  
  
Albaro Chambers, CASE MANAGEMENT
DCP- Discharge Planning
 
 
Updated by [id number]: Albaro Chambers on 19  11:07 am CT
Patient Name:  CHANTAL TIPTON   
Encounter No:  [id number]   
:  1931   
Primary Insurance:  HUMANA CHOICE PPO MCR ADVANT  
Anticipated DC Date: 2019   
Planned Disposition:  Home with Home Health  
External Planned Provider: [facility]  
  
  
DCP follow-up note: CM SPOKE TO PT IN ROOM THIS MORNING, PT REPORTS SHE IS 
HAVING KYPHOPLASTY TODAY BEFORE GOING HOME.  CM INFORMED PT THAT QUOTE FOR 
TUBE FEEDING MATERIALS RECEIVED, CM PROVIDED TO PT.  PT REPORTS SHE WILL 
NOTIFY GINGER THIS MORNING WHEN SHE GETS HERE.  PT DENIES NEEDS.    
  
WHITNEY SPOKE TO AMARI BERMUDEZ, [phone], IN PERSON AT [employer].  GINGER 
REPORTS THAT [facility] HAS MADE ARRAGMENTS FOR DELIVERY OF THE HOSPITAL BED AND 
BEDSIDE COMMODE TODAY.  SHE HAS NOT HEARD FROM [facility] ABOUT TUBE FEEDING; 
SHE DID RECEIVE THE QUOTE FOR PRIVATE PAY AND WANTS TO MAKE THE ARRAGNEMENTS. 
 GINGER HAS NOT [facility]ED [facility] HERSELF.  CM CALLED [facility] 
HOME INFUSION, [phone], NOTIFED DAVIN THAT PT'S FAMILY WANTS TO PRIVATE 
PAY THE PUMP AND FEEDING SUPPLIES AND ASKED HER TO CALL GINGER AS SOON AS 
POSSIBLE AND MAKE ARRANGEMENTS FOR DELIVERY TODAY.  MALATHI REPORTS MATERIALS 
AND SUPPLIES ARE CASH ON DELIVERY AND SHE WILL CALL JOHNNIE IMMEDIATELY TO 
MAKE DELIVERY AND EDUCATION ARRANGEMENTS.  
  
NOTIFY [email or web address] [facility], [phone], OF DISCHARGE.  FAX DISCHARGE TO [facility] 
AT, [phone].  
  
  
Albaro Chambers, CASE MANAGEMENT
DCP- Discharge Planning
 
Updated by [id number]: Albaro Chambers on 19   3:06 pm CT
Patient Name:  CHANTAL TIPTON   
Encounter No:  [id number]   
:  1931   
Primary Insurance:  HUMANA CHOICE PPO MCR ADVANT  
Anticipated DC Date: 2019   
Planned Disposition:  Home with Home Health  
External Planned Provider: [facility]  
  
  
DCP follow-up note: CM CALLED DWAINE AT [facility], [phone], PROVIDED 
INFORMATION FOR HOSPITAL BED AND BEDSIDE COMMODE, INFORMED OF PLANNED 
DISCHARGE TOMORROW.  CM FAXED ORDER AND REFERRAL INFORMATION TO [facility] AT 
[phone].  [facility] TO ARRANGE DELIVERY OF MEDICAL EQUIPMENT WITH PT'S 
FAMILY.  
  
CM CALLED [facility] INFUSION, [phone], SPOKE TO MARGARET 
REGARDING TUBE FEEDING ORDER; MARGARET ADVISED THAT PT'S INSURANCE WILL NOT PAY 
FOR SUPPLEMENTAL NUTRITION AS PT IS ON REGULAR DIET AND ABLE TO EAT BY MOUTH. 
 
 MARGARET WILL REVIEW REFERRAL TO SEE IF THERE IS SOME WAY THAT INSURANCE MAY 
COVER THE SERVICE.  CM SPOKE TO PT IN ROOM, INFORMED OF ABOVE; PT ASKED FOR 
CM TO GET CASH PAY PRICE FOR TUBE FEEDING AND DISCUSS WITH GINGER, HER NIECE. 
 CM FAXED REFERRAL TO [facility] [phone].  
  
CM CALLED [facility], [phone], SPOKE TO FILI, PROVIDED 
REFERRAL INFORMATION AND ADVISED OF PLANNED DISCHARGE 19.  THEY WILL 
ACCEPT PT FOR ADMIT ON 19.  CM FAXED HOME HEALTH REFERRAL TO [facility] AT 
[phone].  CM SPOKE TO GINGER MILLS IN HALLWAY AS DIRECTED BY PT; 
INFORMED OF ABOVE ARRANGEMENTS.  CM SPOKE TO BEDSIDE NURSE REGARDING FAMILY 
CONCERN OF OXYGEN NEED, BEDSIDE NURSE INFORMED CM THAT SHE WOULD ATTEMPT 
OXYGEN WEANING.  
  
[facility] HOSPITAL BED AND BEDSIDE COMMODE WITH FAMILY.
  PT'S INSURANCE WILL NOT PAY FOR TUBE FEEDING MATERIALS OR SUPPLIES - 
[facility] GETTING QUOTE FOR CASH PAY.  [facility] 
[facility] WILL ACCEPT PT AT DISCHARGE.    
  
FOR DISCHARGE, NOTIFY [facility] -[phone], FAX DISCHARGE 
INFORMATION TO [facility] -[phone].  CM TO CONTINUE TO FOLLOW AND ASSIST AS 
NEEDED.  
  
  
Albaro Chambers, CASE MANGEMENT  
  
Appended by Albaro Chambers on 2019 16:06 CST:  
CM SPOKE TO PT'S GREAT NIECE IN LAW WHO INFORMED CM THAT [facility] HAS CALLED 
TO ARRANGE DELIVERY OF HOSPITAL BED AND BEDSIDE COMMODE.  SHE HAS NOT HEARD 
FROM [facility] INFUSION REGARDING TUBE FEEDING.  CM CALLED [facility]USION, [phone], SPOKE TO DAVIN WHO REPORTS INSURANCE 
WILL NOT COVER COSTS OF TUBE FEEDING AND PROVIDED QUOTE OVER THE PHONE AND 
WILL FAX QUOTE TO CM FOR PT'S FAMILY.  BEDSIDE NURSE NOTIFIED.  PT'S FAMILY 
ARE NOT IN ROOM, CM LEFT NOTE ON WHITE BOARD FOR FAMILY NOTIFYING THAT TUBE 
FEEDING COSTS ARE NOT COVERED BY INSURANCE WITH QUOTED PRICES.  
  
[facility] BED AND BEDSIDE COMMODE WITH FAMILY.
  PT'S INSURANCE WILL NOT PAY FOR TUBE FEEDING MATERIALS OR SUPPLIES - 
[facility] [place] [place] QUOTED FOR CASH PAY OF $8 PER DAY FOR 
PUMP AND SUPPLIES / $1.50 PER CAN FOR JEVITY 1.2.  [facility] WILL 
ACCEPT PT AT DISCHARGE.    
  
FOR DISCHARGE, NOTIFY [facility] -[phone], FAX DISCHARGE 
INFORMATION TO [facility] -[phone].  CM TO CONTINUE TO FOLLOW AND ASSIST AS 
NEEDED.  
  
  
Albaro Chambers, CASE MANGEMENT
DCP- Discharge Planning
 
Updated by [id number]: Albaro Chambers on 19   4:27 pm CT
Patient Name:  CHANTAL TIPTON   
Encounter No:  [id number]   
 
:  1931   
Primary Insurance:  HUMANA CHOICE PPO MCR ADVANT  
Anticipated DC Date: 2019   
Planned Disposition:  HOME WITH HOME HEALTH  
External Planned Provider: [facility]  
  
  
DCP follow-up note: CM RECEIVED ORDER FOR HOME HEALTH, TUBE FEEDING AND 
HOSPITAL BED.  CM MET WITH PT AND GRAND NEPHEW'S SPOUSE IN ROOM.  CM BEGAN 
DISCUSSING ORDER, PT'S GRAND NEPHEW'S SPOUSE INFORMED CM THAT PT PLANS TO 
DISCHARGE TO [place] FOR REHAB AND ASKED IF CM HAS HEARD FROM [place] AS SHE DID NOT SEE THEM FRIDAY AS THEY WERE SUPPOSED TO EVALUATE PT 
THEN.  CM CALLED [facility], SPOKE TO LATONYA WHO WILL 
CHECK WITH MIRANDA TO FIND OUT WHAT HAS HAPPENED.  LATONYA WILL HAVE MIRANDA CALL 
CM AS SOON AS POSSIBLE.    
CM MET WITH PT, PT'S ELIAS AND PT'S GRAND NEPHEW'S NIECE.  PT'S ELIAS 
INFORMED CM THAT CM DOES NOT NEED TO SEE THEM AND THAT DR. LEPE HAS 
TAKEN CARE OF EVERYTHING. CM ASKED WHAT HAD BEEN WORKED OUT.  CM WAS ADVISED 
THAT PT WILL HAVE THE KYPHOPLASTY ON WEDNESDAY AND THEN GO TO [place] 
FOR REHAB, DR. LEPE WILL TALK TO MIRANDA AT [place] AND WORK THIS 
OUT.  CM NOTIFIED LIZZY EMANUEL AND DR. LEPE.    
  
CM RECEIVED CALL BACK FROM MIRANDA WHO SPOKE TO FAMILY, INFORMED THEM THAT PT 
IS NOT APPROPRIATE CANDIDATE FOR REHAB AS SHE IS REFUSING THERAPY SERICES AND 
 INFORMED CM THAT FAMILY HAS REFUSED LONG TERM CARE PLACEMENT AT [facility].    
  
CM MET WITH PT AND PT'S GRAND NEPHEWS SPOUSE IN ROOM.  CM DISCUSSED IMPORTANT 
MESSAGE FROM MEDICARE AND DISCUSSED HOW TO COMPLAIN IF NEEDED TO HOUSE 
SUPERVISOR, ADMINISTRATION AND IF NEEDED, QUALITY IMPROVEMENT OFFICE FOR 
MEDICARE.  GINGER INFORMED CM THAT SHE HAS COMPLAINED TO THE DOCTORS BUT HAS 
BEEN THINKING SHE NEEDS TO COMPLAIN TO SOMEONE ELSE. CM DISCUSSED DISCHARGE 
PLAN.  GINGER HAS TALKED TO PT, PT'S ELIAS AND MIRANDA AT [place].  THEY 
ARE NOT GOING TO PUT PT INTO LONG TERM CARE AND WILL BE TAKING PT HOME AS 
DISCUSSED LAST WEEK; GINGER STATES PT IS NOT READY FOR HOSPICE BUT WANTS HOME 
HEALTH.  HOME HEALTH LISTING PROVIDED AND DISCUSSED, GINGER WANTS TO USE THE 
HIGHEST RATED COMPANY, PT TOLD GINGER TO SIGN THE FORMS.  GINGER SIGNED THE 
IMPORTANT MESSAGE FROM MEDICARE AND [facility] FOR [facility].  GINGER 
STATES THAT THEY WILL NEED A HOSPITAL BED, BEDSIDE COMMODE, OXYGEN AND TUBE 
FEEDING SET UP FOR PT AT HOME. CM EXPLAINED THAT PT'S INSURANCE HAS TO PRE 
AUTHORIZE ALL MEDICAL EQUIPMENT AND THAT SINCE PT IS ABLE TO EAT, INSURANCE 
MAY NOT COVER TUBE FEEDING.  THEY HAVE NO PREFERENCE ON PROVIDER AND GINGER 
ASKED TO BE CONTACTED REGARDING ANY COPAYS FOR EQUIPMENT [phone].  
  
CM HAS ORDER FOR HOME HEALTH, HOSPITAL BED AND TUBE FEEDING.  CM WILL NEED 
ADDITIONAL ORDERS FOR BEDSIDE COMMODE AND OXYGEN TESTING.    
  
CM TO FIND IN NETWORK PROVIDER FOR PT'S INSURANCE TO ARRANGE NEEDED MEDICAL 
EQUIPMENT AS SOON AS POSSIBLE.  CM TO COMPLETE HOME HEALTH ARRANGEMENTS WITH 
[employer].  
  
Albaro Chambers
 
DCP- Discharge Planning
 
Updated by [id number]: Marli Rancho on 19  11:53 am CT
RECEIVED NOTICE THAT THE PATIENTS FAMILY WAS WANTING HER TO HAVE THE 
KYPHOPLASTY BEFORE SHE LEAVES THE HOSPITAL. IT WAS MENTIONED THAT THE 
PATIENTS POA HAS NOT BEEN HER AND THERE IS NO POA PAPERWORK IN THE CHART. I 
ASKED THE BEDSIDE NURSE PENG ENCARNACION TO COME INTO THE ROOM WITH ME TO DISCUSS 
THE NEED FOR POA PAPERWORK IN ORDER TO HAVE ANY CONSENTS FOR PROCEDURES 
SIGNED SINCE THE PATIENT WAS NOT ABLE TO SIGN HERSELF, AND BY THE HIERACHY OF 
LAW, THE POA OR NEXT OF KIN WOULD BE WHO NEEDED TO SIGN AND SINCE THEY (THE 
LADY AND FIJUVENTINO) WERE NOT BLOOD RELATED, THEY COULD NOT SIGN FOR HER AS THEY 
HAVE BEEN. AT THIS TIME I WAS QUICKLY TOLD THAT SHE WAS IN HER RIGHT MIND AND 
ABLE TO MAKE ALL HER OWN DECISIONS. THE LADY AT BEDSIDE PROCEEDED TO TELL ME 
ABOUT ALL THE TIMES PEOPLE CAME IN AND QUESTIONED HER ABOUT THE DATE, TIME, 
PRESIDENT, ETC AND SHE WAS ABLE TO ALWAYS ANSWER ALL THEIR QUESTIONS. SHE 
STATED THE PATIENT WAS ABLE TO SIGN FOR HERSELF. UP TO THIS POINT, THE 
PATIENT HAD NOT OPENED HER EYES AND HAD NOT SAID ANYTHING. IT TOOK ME 
TOUCHING THE PATIENT'S ARM TO GET HER TO OPEN HER EYES AND ACKNOWLEDGE MY 
PRESENCE AND ANSWER MY QUESTIONS. THE PATIENT IS WITH IT AND ABLE TO ANSWER 
QUESTIONS. AT THIS POINT I ASKED HER IF SHE WANTED TO HAVE THE KYPHOPLASTY 
DONE. SHE STATED YES. THE LADY AT BEDSIDE BECAME VERY AGITATED STATING THAT 
SHE KNEW ABOUT HIPPA LAWS AND WANTED TO KNOW WHY ANYONE EVEN ALLOWED THINGS 
TO HAPPEN UP UNTIL NOW. NO ONE HAD ASKED FOR ANYONE'S DRIVERS LICENES TO 
VERIFY THEY WERE WHO THEY SAID THEY WERE. I TRIED TO EXPLAINE THAT EVEN WITH 
THAT, WE HAD NO WAY TO ACTUALLY VERIFY THAT WITH A DRIVERS LICENSE BECAUSE 
ANYONE CAN SAY THEY ARE SOMEONES FAMILY AND WE DON'T KNOW OTHERWISE UNLESS 
THE PATIENT SAYS SO, BUT SHE CUT ME OFF AS I WAS TALKING. I STOOD AT BEDSIDE 
AND LISTENED TO HER RANT ABOUT ALL THE THINGS THAT SHE HAS WITNESSED HERE AT 
THIS HOSPITAL THAT HAS MADE HER SICK TO HER STOMACH ESPECIALLY ABOUT THE FACT 
THAT SHE CAME IN HERE WITH A HURT BACK AND NOW THEY ARE JUST WATCHING HER DIE.
 I APOLOGIZED TO HER ABOUT ALL OF THE THINGS THAT MARIA ALEJANDRA WAS UPSET ABOUT, AND 
THEN DIRECTED MY CONVERSATION TO THE PATIENT, EXPLAINING THAT IF SHE WANTED 
THEY KYPHOPLASTY AND HER URINE WAS CLEAR THAT I WOULD EXPLAIN THIS TO THE 
NURSE PRACTITIONER. THAT ULTIMATELY I AM HERE TO TAKE CARE OF HER AND MAKE 
SURE WE ARE DOING WHAT SHE WANTS. I ALSO EXPLAINED TO HER THAT SHE NEEDS TO 
SIGN ALL OF HER OWN PAPERWORK, EVEN IF IT IS JUST WITH AN "X". THAT WE SIGN 
AS WITNESSES TO STATE WE KNOW SHE UNDERSTANDS AND SHE IS SIGNING. EXPLAINED 
THAT AS LONG AS SHE IS ABLE TO MAKE ALL HER OWN DECISIONS, WE DID NOT NEED 
HER POA PAPERWORK. SHE IS IN AGREEMENT. THE GREAT NEPHEWS WIFE WHO IS AT 
BEDSIDE CONTINUED WITH HER RANT, I AGAIN APOLOGIZED AND THE BEDSIDE NURSE AND 
I LEFT THE ROOM. I EXPLAINED TO SCOTTIE THAT THE PATIENT WAS CURRENTLY IN HER 
RIGHT MIND AND SHE IS WANTING THEY KYPHOPLASTY, AND SHE RECONSULTED IR TO SEE 
THE PATIENT.
DCP- Discharge Planning
 
Updated by [id number]: Melany Olivares on 19   4:25 pm CT
CM RECEIVED AN ORDER REGARDING PLANS TO DISCHARGE TO HOME W/ HOSPITAL BED , 
TUBE FEEDING AND HOME HEALTH. PATIENT'S NUTRITION NOTE IS 19. WILL NEED 
NUTRITION NOTE REGARDING PATIENT NEEDS FOR DISCHARGE. CM UNDERSTANDS THE 
PATIENT DOES EAT AND TAKE HER MEDICATIONS BY MOUTH. SHE IS PRESENTLY ON 
FEEDINGS VIA  PUMP. SHE HAS BEEN ADVANCED TO 60 CC/HR TODAY WHICH IS HER GOAL 
RATE.  
 
SPOKE WITH LIYAH KLINE AND SHE REPORTS NO RESIDUAL AT THIS TIME.  
WILL NEED TO HAVE [facility] PROVIDE BED AND SPECIALTY MATTRESS  FOR HOME 
AND   
[employer]ER[employer] COVERAGE FOR TUBE FEEDING AND EQUIPMENT.  
CM TO FOLLOW UP IN AM.
DCP- Discharge Planning
 
Updated by [id number]: Albaro Chambers on 19   7:14 am CT
Patient Name:  CHANTAL TIPTON   
Encounter No:  [id number]   
:  1931   
Primary Insurance:  HUMANA CHOICE PPO MCR ADVANT  
Anticipated DC Date: 2019   
Planned Disposition:  Skilled Nursing Facility  
External Planned Provider: [facility], MEDICARE REHAB BED 
  
  
DCP follow-up note: CM FAXED UPDATE TO [facility], 
[phone] FOR REHAB REQUEST.  
  
CM WAITING ADMISSION DETERMINATION FROM [facility] FOR 
REHAB PLACEMENT.  
  
ALBARO CHAMBERS CASE MANAGEMENT
DCP- Discharge Planning
 
Updated by [id number]: Albaro Chambers on 19   3:46 pm CT
Patient Name:  CHANTAL TIPTON   
Encounter No:  [id number]   
:  1931   
Primary Insurance:  HUMANA CHOICE PPO MCR ADVANT  
Anticipated DC Date: 2019   
Planned Disposition:  Skilled Nursing Facility  
External Planned Provider:  [facility], MEDICARE REHAB 
BED  
  
  
DCP follow-up note: CM MET WITH PT'S TIANNA MCKEON BY MARRIAGE, AMARI BERMUDEZ, 
[phone].  GINGER STATES THAT JULIANNE BERMUDEZ IS PT'S FIANCE.  PT HAS POWER 
OF  WITH GREAT NEPHEW, GINGERS SPOUSE, MAGED BERMUDEZ, WHO WORKS IN 
[employer].  GINGER WILL GET MAGED TO HOSPITAL ALONG WITH POWER OF  
PAPERWORK AS SOON AS POSSIBLE.  THEY ARE IN AGREEMENT WITH REHAB AT [place], BUT [place] MAY NOT ACCEPT AS PT HAS CONTINUED DECLINE.  [place] TO COME TOMORROW TO EVALUATE PT FOR REHAB.  IF [place] DOES 
NOT ACCEPT FOR REHAB, THEY ARE THINKING TO TAKE PT HOME TO [facility] HERE 
IN [place] AND JOHNNIEDENNIS ALONG WITH FAMILY WILL CARE FOR PT WITH HOME 
HOSPICE.  GINGER REPORTS THEY WILL MAKE THAT DECISION TOMORROW.  CM EXPLAINED 
THAT THE POWER OF  WILL HAVE TO BE IN AGREEMENT AND SIGN ANY NEEDED 
AUTHORIZATIONS FOR ENROLLMENT IN REHAB OR HOSPICE CARE.  GINGER REPORTS 
UNDERSTANDING, PROVIDED CELL PHONE NUMBER FOR MAGED BERMUDEZ, [phone].  
  
CM WAITING ADMISSION DETERMINATION FROM [facility] FOR 
 
REHAB PLACEMENT.  
  
NEERU VILLATORO MANAGEMENT
 
DCP- Discharge Planning
 
Updated by [id number]: Albaro Chambers on 19   3:34 pm CT
Patient Name:  CHANTAL TIPTON   
Encounter No:  [id number]   
:  1931   
Primary Insurance:  HUMANA CHOICE PPO MCR ADVANT  
Anticipated DC Date: 2019   
Planned Disposition:  Skilled Nursing Facility  
External Planned Provider: [facility], MEDICARE REHAB BED 
  
  
DCP follow-up note: CM MET WITH PT AND SON/POA, JULIANNE BERMUDEZ, AT FAMILY 
REQUEST.  MR. BERMUDEZ REPORTS IT IS NOW TIME TO SEND REFERRAL TO [place] 
FOR REHAB PLACEMENT.  IMPORTANT MESSAGE FROM MEDICARE PROVIDED AND EXPLAINED. 
  
[phone].   
  
CM WAITING ADMISSION DETERMINATION FROM [facility] FOR 
REHAB PLACEMENT.  
  
ALBARO CHAMBERS, CASE MANAGEMENT
DCP- Discharge Planning
 
Updated by [id number]: Albaro Chambers on 19   4:52 pm CT
Patient Name: CHANTAL TIPTON                                     
Admission Status: Elective   
Accout number: [id number]                              
Admission Date: 2019   
: 1931                                                        
Admission Diagnosis:ACUTE KIDNEY FAILURE, UNSPECIFIED   
Attending: SHARATH TORRES                                                
Current LOS:  2   
  
Anticipated DC Date:    
Planned Disposition: Skilled Nursing Facility   
Primary Insurance: HUMANA CHOICE PPO MCR ADVANT   
PLANNED EXTERNAL PROVIDER:  [facility], MEDICARE REHAB 
BED  
  
Discharge Planning Comments:   
CM RECEIVED ORDER INDICATING PT WANTS NURSING HOME CARE.  CM MET WITH PT AND 
SON IN ROOM TO DISCUSS DISCHARGE PLANNING AND NEEDS. PT REPORTS THAT BEFORE 
GETTING SICK, SHE WAS LIVING AT HOME WITH HER SON, INDEPENDENT IN HER CARE.  
PT REPORTS SHE DID TELL THE DOCTOR THAT SHE WOULD BE BETTER OFF IN A NURSING 
HOME BEFORE FAMILY ARRIVED TODAY.  PT WANTS REHAB, NOT LONG TERM CARE.   PT 
HAS NO MEDICAL EQUIPMENT AND NO OUTSIDE SERVICES ASSISTING IN THE HOME. CM 
DISCUSSED AVAILABILITY OF HOME HEALTH, REHAB SERVICES AND MEDICAL EQUIPMENT. 
 
PT'S SON INITIALLY DECLINED TO PARTICIPATE IN DISCHARGE PLANNING REPORTING PT 
WAS SENT TO REHAB DOWNSTAIRS TOO SOON WHEN SHE WAS NOT ABLE TO WALK OR EVEN 
FEED HERSELF. SON WANTS PT CONSIDERED FOR INPATIENT REHAB AGAIN STATING THEY 
SENT HER BACK HERE BECAUSE SHE USED ALL OF HER DAYS DOWN THERE.  PT DOES NOT 
THINK SHE CAN PARTICIPATE IN THREE HOURS OF PROGRESSIVE THERAPY PER DAY.  CM 
EXPLAINED THAT IF PT IS NOT ABLE TO PARTICIPATE IN THE THREE HOURS OF 
PROGRESSIVE THERAPY, IS NOT ABLE TO STAND OR FEED HERSELF, SHE IS NOT GOING 
TO BE APPROPRIATE FOR READMISSION TO INPATENT REHAB.  CM DISCUSSED 
AVAILABILITY OF SKILLED NURSING REHAB SERVICES AND PROVIDED CHOICE FORM.  
PT'S SON REPORTS HE ALREADY SIGNED ONE OF THESE FOR [place] THE LAST 
VISIT AND SPEAKING WITH THIS CM.  CM EXPLAINED A NEW ONE WAS NEEDED.  PT'S 
SON SIGNED THE FORM AND DOES NOT WANT PT SENT TO REHAB BEFORE SHE IS STABLE.  
CM EXPLAINED THAT ALL DOCTORS WOULD HAVE TO INDICATE PT IS READY TO DISCHARGE 
AND CM IS ONLY WANTING TO BE PROACTIVE ON PT'S BEHALF FOR DISCHARGE PLANNING 
AND SECURE REHAB BED AHEAD OF TIME TO ENSURE THAT PT IS ABLE TO GET INTO [place] WHEN DISCHARGED AS [place] IS POPULAR REHAB AND DOES FILL UP.  
PT'S SON WILL WANTS PT TO BE STABLE FOR DISCHARGE FOR CM TO SEND REFERRALS.  
CHOICE SIGNED. CM PROVIDED PT'S SON WITH CM CONTACT INFORMATION.  
  
CM TO SEND REFERRAL TO [place] NURSING AND REHAB WHEN PROJECTED 
DISCHARGE DATE IS KNOWN, AS PT'S SON DOES NOT WANT REHAB REFERRAL SENT OUT 
PRIOR TO PT'S MEDICAL STABILITY FOR DISCHARGE TO REHAB.  
  
: Albaro Chambers
 
 DCPIA - Discharge Planning Initial Assessment
 
Updated by [id number]: Albaro Chambers on 19   1:21 pm
*  Is the patient Alert and Oriented?
Yes
*  How many steps to enter\exit or inside your home? NONE *  PCP DR. MCCALL *  Pharmacy
[facility]
*  Preadmission Environment
Acute Inpatient Rehab
*  Facility Name
[facility]
*  ADLs
Total Dependent
*  Equipment
None
*  Other Equipment
NO MEDICAL EQUIPMENT PROVIDER PREFERECE
*  List name and contact numbers for known caregivers / representatives who 
currently or will assist patient after discharge:
ELIAS KUNZ [phone]
*  Verbal permission to speak to the caregivers and representatives has been 
obtained from the patient.
Yes
*  Community resources currently utilized
None
*  Please name any agencies selected above.
NONE
 
*  Additional services required to return to the preadmission environment?
Yes
*  Can the patient safely return to the preadmission environment?
Yes
*  Has this patient been hospitalized within the prior 30 days at any 
hospital?
Yes
 
 
 
 
 
Coverage Notice
 
Reviewer: [email or web address] - Albaro Chambers
 
Notice Issued Date-Time: 2019  13:00
Notice Type: Patient Choice Letter
 
Notice Delivered To: Family Member
Relationship to Patient: Other Relationship
Representative Name: JULIANNE BERMUDEZ
 
Delivery Method: HAND - Hand Delivered
Ariadne Days:
Prior Verbal Notification: 
 
Recipient Understood Notice: Yes
Recipient Signature: Yes
Med Rec Note Co-signed by Attending:
 
Coverage Notice Comment:  [facility]
Reviewer: WILLIAN Chambers
 
Notice Issued Date-Time: 2019  12:20
Notice Type: IM Discharge Notice
 
Notice Delivered To: Family Member
Relationship to Patient: Other Relationship
Representative Name: JULIANNE BERMUDEZ
 
Delivery Method: HAND - Hand Delivered
Ariadne Days:
Prior Verbal Notification: 
 
Recipient Understood Notice: Yes
Recipient Signature: Yes
Med Rec Note Co-signed by Attending:
 
Coverage Notice Comment:  
Reviewer: WILLIAN Chambers
 
 
Notice Issued Date-Time: 2019  16:20
Notice Type: IM Discharge Notice
 
Notice Delivered To: Family Member
Relationship to Patient: Other Relationship
Representative Name: AMARI BERMUDEZ
 
Delivery Method: HAND - Hand Delivered
Ariadne Days:
Prior Verbal Notification: 
 
Recipient Understood Notice: Yes
Recipient Signature: Yes
Med Rec Note Co-signed by Attending:
 
Coverage Notice Comment:  
Reviewer: WILLIAN Chambers
 
Notice Issued Date-Time: 2019  16:20
Notice Type: Patient Choice Letter
 
Notice Delivered To: Family Member
Relationship to Patient: Other Relationship
Representative Name: AMARI BERMUDEZ
 
Delivery Method: HAND - Hand Delivered
Ariadne Days:
Prior Verbal Notification: 
 
Recipient Understood Notice: Yes
Recipient Signature: Yes
Med Rec Note Co-signed by Attending:
 
Coverage Notice Comment:  [facility]
 
Last DP export: 19   6:49 a
Patient Name: CHANTAL TIPTON
 
Encounter #: [id number]
Page [id number]
 
 
 
 
 
Electronically Signed by CHELE RAMON on 19 at 0756
 
 
 
 
 
 
**All edits/amendments must be made on the electronic document**
 
DICTATION DATE: 19     : KARY  19 0756     
RPT#: [id number]                                DC DATE:        
                                               STATUS: ADM IN  
[facility]
[address] [place] [address]
***END OF REPORT***

## 2019-01-24 NOTE — NUR
RECEIVED A/A/OX3.  DENIES ANY PAIN AS LONG AS SHE IS STILL IN BED.  STATES SHE
HAS A LOT OF PAIN WHEN BEING TURNED. NO REQUESTS AT THIS TIME.  PEG TUBE
PATENT AND PLACEMENT CHECKED PER A/A.  JEVITY 1.5 IN PROGRESS AT 40CC HR WITH
25CC WATER FLUSH Q HOUR. PEG SITE IS CLEAN AND DRY WITH DRESSING C/D/I.
DRESSING TO HEELS C/D/I.  DRESSING TO POSTERIOR LEFT UPPER BACK C/D/I.  FOLOEY
PATENT AND DRAINING LIGHT YELLOW COLORED URINE.  PT IN ON 1ST STEP OVERLAY.

## 2019-01-24 NOTE — MORECARE
CASE MANAGEMENT DISCHARGE SUMMARY
 
 
PATIENT: CHANTAL TIPTON                        UNIT: [id number]
ACCOUNT#: [id number]                       ADM DATE: 19
AGE: 88     : [birth year]  SEX: F            ROOM/BED: D.2112    
AUTHOR: YENNYDOC                             PHYSICIAN:                               
 
REFERRING PHYSICIAN: SHARATH TORRES MD               
DATE OF SERVICE: 19
Discharge Plan
 
 
Patient Name: CHANTAL TIPTON
Facility: [facility]
Encounter #: [id number]
Medical Record #: [id number]
: 1931
Planned Disposition: Home with Home Health
Anticipated Discharge Date: 19
 
Discharge Date: 
Expected LOS: 22
Initial Reviewer: [id number]
Initial Review Date: 2019
Generated: 19   8:49 am 
Comments
 
DCP- Discharge Planning
 
Updated by [id number]: Albaro Chambers on 19  11:07 am CT
Patient Name:  CHANTAL TIPTON   
Encounter No:  [id number]   
:  1931   
Primary Insurance:  HUMANA CHOICE PPO MCR ADVANT  
Anticipated DC Date: 2019   
Planned Disposition:  Home with Home Health  
External Planned Provider: [facility]  
  
  
DCP follow-up note: CM SPOKE TO PT IN ROOM THIS MORNING, PT REPORTS SHE IS 
HAVING KYPHOPLASTY TODAY BEFORE GOING HOME.  CM INFORMED PT THAT QUOTE FOR 
TUBE FEEDING MATERIALS RECEIVED, CM PROVIDED TO PT.  PT REPORTS SHE WILL 
NOTIFY GINGER THIS MORNING WHEN SHE GETS HERE.  PT DENIES NEEDS.    
  
WHITNEY SPOKE TO AMARI BERMUDEZ, [phone], IN PERSON AT NURSES STATION.  GINGER 
REPORTS THAT [facility] DELIVERY OF THE HOSPITAL BED AND 
BEDSIDE COMMODE TODAY.  SHE HAS NOT HEARD FROM [facility] ABOUT TUBE FEEDING; 
SHE DID RECEIVE THE QUOTE FOR PRIVATE PAY AND WANTS TO MAKE THE ARRAGNEMENTS. 
 GINGER HAS NOT CALLED [facility] HERSELF.  CM CALLED [facility], [phone], NOTIFED DAVIN THAT PT'S FAMILY WANTS TO PRIVATE 
PAY THE PUMP AND FEEDING SUPPLIES AND ASKED HER TO CALL JOHNNIE AS SOON AS 
POSSIBLE AND MAKE ARRANGEMENTS FOR DELIVERY TODAY.  MALATHI REPORTS MATERIALS 
AND SUPPLIES ARE CASH ON DELIVERY AND SHE WILL CALL GINGER IMMEDIATELY TO 
MAKE DELIVERY AND EDUCATION ARRANGEMENTS.  
  
NOTIFY [facility], [phone], OF DISCHARGE.  FAX DISCHARGE TO [facility], [phone].  
  
  
Albaro Chambers, CASE MANAGEMENT
DCP- Discharge Planning
 
Updated by [id number]: Albaro Chambers on 19   3:06 pm CT
Patient Name:  CHANTAL TIPTON   
Encounter No:  [id number]   
:  1931   
Primary Insurance:  HUMANA CHOICE PPO MCR ADVANT  
Anticipated DC Date: 2019   
Planned Disposition:  Home with Home Health  
External Planned Provider: [facility]  
  
  
DCP follow-up note: CM CALLED DWAINE AT [facility], [phone], PROVIDED 
INFORMATION FOR HOSPITAL BED AND BEDSIDE COMMODE, INFORMED OF PLANNED 
DISCHARGE TOMORROW.  CM FAXED ORDER AND REFERRAL INFORMATION TO [facility] AT 
[phone].  [facility] TO ARRANGE DELIVERY OF MEDICAL EQUIPMENT WITH PT'S 
FAMILY.  
  
CM CALLED [facility], [phone], SPOKE TO MARGARET 
REGARDING TUBE FEEDING ORDER; MARGARET ADVISED THAT PT'S INSURANCE WILL NOT PAY 
FOR SUPPLEMENTAL NUTRITION AS PT IS ON REGULAR DIET AND ABLE TO EAT BY MOUTH. 
 MARGARET WILL REVIEW REFERRAL TO SEE IF THERE IS SOME WAY THAT INSURANCE MAY 
COVER THE SERVICE.  CM SPOKE TO PT IN ROOM, INFORMED OF ABOVE; PT ASKED FOR 
CM TO GET CASH PAY PRICE FOR TUBE FEEDING AND DISCUSS WITH GINGER, HER NIECE. 
 CM FAXED REFERRAL TO [facility] -[phone].  
  
CM CALLED [facility], [phone], SPOKE TO FILI, PROVIDED 
REFERRAL INFORMATION AND ADVISED OF PLANNED DISCHARGE 19.  THEY WILL 
ACCEPT PT FOR ADMIT ON 19.  CM FAXED HOME HEALTH REFERRAL TO [facility] AT 
[phone].  CM SPOKE TO GINGER [place]ILLS IN HALLWAY AS DIRECTED BY PT; 
INFORMED OF ABOVE ARRANGEMENTS.  CM SPOKE TO BEDSIDE NURSE REGARDING FAMILY 
CONCERN OF OXYGEN NEED, BEDSIDE NURSE INFORMED CM THAT SHE WOULD ATTEMPT 
OXYGEN WEANING.  
  
[facility] ARRANGING HOSPITAL BED AND BEDSIDE COMMODE WITH FAMILY.
  PT'S INSURANCE WILL NOT PAY FOR TUBE FEEDING MATERIALS OR SUPPLIES - 
[facility] GETTING QUOTE FOR CASH PAY.  [facility] WILL ACCEPT PT AT DISCHARGE.    
  
FOR DISCHARGE, NOTIFY [facility] -[phone], FAX DISCHARGE 
INFORMATION TO [facility] -[phone].  CM TO CONTINUE TO FOLLOW AND ASSIST AS 
 
NEEDED.  
  
  
Albaro Kcwell, NEERU MELENDEZ  
  
Appended by Albaro Chambers on 2019 16:06 CST:  
CM SPOKE TO PT'S GREAT NIECE IN LAW WHO INFORMED CM THAT [facility] HAS CALLED 
TO ARRANGE DELIVERY OF HOSPITAL BED AND BEDSIDE COMMODE.  SHE HAS NOT HEARD 
FROM [facility] REGARDING TUBE FEEDING.  CM CALLED [facility], [phone], SPOKE TO DAVIN WHO REPORTS INSURANCE 
WILL NOT COVER COSTS OF TUBE FEEDING AND PROVIDED QUOTE OVER THE PHONE AND 
WILL FAX QUOTE TO CM FOR PT'S FAMILY.  BEDSIDE NURSE NOTIFIED.  PT'S FAMILY 
ARE NOT IN ROOM, CM LEFT NOTE ON WHITE BOARD FOR FAMILY NOTIFYING THAT TUBE 
FEEDING COSTS ARE NOT COVERED BY INSURANCE WITH QUOTED PRICES.  
  
[facility] ARRANGING HOSPITAL BED AND BEDSIDE COMMODE WITH FAMILY.
  PT'S INSURANCE WILL NOT PAY FOR TUBE FEEDING MATERIALS OR SUPPLIES - 
[facility] QUOTED FOR CASH PAY OF $8 PER DAY FOR 
PUMP AND SUPPLIES / $1.50 PER CAN FOR JEVITY 1.2.  [facility] WILL 
ACCEPT PT AT DISCHARGE.    
  
FOR DISCHARGE, NOTIFY [facility] -[phone], FAX DISCHARGE 
INFORMATION TO [facility] -[phone].  CM TO CONTINUE TO FOLLOW AND ASSIST AS 
NEEDED.  
  
  
NEERU Oro
DCP- Discharge Planning
 
Updated by [id number]: Albaro Chambers on 19   4:27 pm CT
Patient Name:  CHANTAL TIPTON   
Encounter No:  [id number]   
:  1931   
Primary Insurance:  HUMANA CHOICE PPO MCR ADVANT  
Anticipated DC Date: 2019   
Planned Disposition:  HOME WITH HOME HEALTH  
[facility] CM RECEIVED ORDER FOR HOME HEALTH, TUBE FEEDING AND 
HOSPITAL BED.  CM MET WITH PT AND GRAND NEPHEW'S SPOUSE IN ROOM.  CM BEGAN 
DISCUSSING ORDER, PT'S GRAND NEPHEW'S SPOUSE INFORMED CM THAT PT PLANS TO 
DISCHARGE TO [place] FOR REHAB AND ASKED IF CM HAS HEARD FROM [place] [facility] AS SHE DID NOT SEE THEM FRIDAY AS THEY WERE SUPPOSED TO EVALUATE PT 
THEN.  CM CALLED [facility], SPOKE TO LATONYA WHO WILL 
CHECK WITH MIRANDA TO FIND OUT WHAT HAS HAPPENED.  LATONYA WILL HAVE MIRANDA CALL 
CM AS SOON AS POSSIBLE.    
CM MET WITH PT, PT'S FIJUVENTINO AND PT'S GRAND NEPHEW'S NIECE.  PT'S ELIAS 
INFORMED CM THAT CM DOES NOT NEED TO SEE THEM AND THAT DR. LEPE HAS 
TAKEN CARE OF EVERYTHING. CM ASKED WHAT HAD BEEN WORKED OUT.  CM WAS ADVISED 
THAT PT WILL HAVE THE KYPHOPLASTY ON WEDNESDAY AND THEN GO TO [place] 
FOR REHAB, DR. LEPE WILL TALK TO MIRANDA AT [facility] AND WORK THIS 
 
OUT.  CM NOTIFIED LIZZY EMANUEL AND DR. LEPE.    
  
CM RECEIVED CALL BACK FROM MIRANDA WHO SPOKE TO FAMILY, INFORMED THEM THAT PT 
IS NOT APPROPRIATE CANDIDATE FOR REHAB AS SHE IS REFUSING THERAPY SERICES AND 
 INFORMED CM THAT FAMILY HAS REFUSED LONG TERM CARE PLACEMENT AT [facility].    
  
CM MET WITH PT AND PT'S GRAND NEPHEWS SPOUSE IN ROOM.  WHITNEY DISCUSSED IMPORTANT 
MESSAGE FROM MEDICARE AND DISCUSSED HOW TO COMPLAIN IF NEEDED TO HOUSE 
SUPERVISOR, ADMINISTRATION AND IF NEEDED, QUALITY IMPROVEMENT OFFICE FOR 
MEDICARE.  GINGER INFORMED CM THAT SHE HAS COMPLAINED TO THE DOCTORS BUT HAS 
BEEN THINKING SHE NEEDS TO COMPLAIN TO SOMEONE ELSE. CM DISCUSSED DISCHARGE 
PLAN.  GINGER HAS TALKED TO PT, PT'S ELIAS AND MIRANDA AT [facility].  THEY 
ARE NOT GOING TO PUT PT INTO LONG TERM CARE AND WILL BE TAKING PT HOME AS 
DISCUSSED LAST WEEK; GINGER STATES PT IS NOT READY FOR HOSPICE BUT WANTS HOME 
HEALTH.  HOME HEALTH LISTING PROVIDED AND DISCUSSED, JOHNNIEDENNIS WANTS TO USE THE 
HIGHEST RATED COMPANY, PT TOLD GINGER TO SIGN THE FORMS.  GINGER SIGNED THE 
IMPORTANT MESSAGE FROM MEDICARE AND CHOICE FOR [facility].  GINGER 
STATES THAT THEY WILL NEED A HOSPITAL BED, BEDSIDE COMMODE, OXYGEN AND TUBE 
FEEDING SET UP FOR PT AT HOME. WHITNEY EXPLAINED THAT PT'S INSURANCE HAS TO PRE 
AUTHORIZE ALL MEDICAL EQUIPMENT AND THAT SINCE PT IS ABLE TO EAT, INSURANCE 
MAY NOT COVER TUBE FEEDING.  THEY HAVE NO PREFERENCE ON PROVIDER AND GINGER 
ASKED TO BE CONTACTED REGARDING ANY COPAYS FOR EQUIPMENT -[phone].  
  
CM HAS ORDER FOR HOME HEALTH, HOSPITAL BED AND TUBE FEEDING.  CM WILL NEED 
ADDITIONAL ORDERS FOR BEDSIDE COMMODE AND OXYGEN TESTING.    
  
CM TO FIND IN NETWORK PROVIDER FOR PT'S INSURANCE TO ARRANGE NEEDED MEDICAL 
EQUIPMENT AS SOON AS POSSIBLE.  CM TO COMPLETE HOME HEALTH ARRANGEMENTS WITH 
[employer].  
  
Albaro Chambers
DCP- Discharge Planning
 
Updated by [id number]: Marli Vickers on 19  11:53 am CT
RECEIVED NOTICE THAT THE PATIENTS FAMILY WAS WANTING HER TO HAVE THE 
KYPHOPLASTY BEFORE SHE LEAVES THE HOSPITAL. IT WAS MENTIONED THAT THE 
PATIENTS POA HAS NOT BEEN HER AND THERE IS NO POA PAPERWORK IN THE CHART. I 
ASKED THE BEDSIDE NURSE PENG ENCARNACION TO COME INTO THE ROOM WITH ME TO DISCUSS 
THE NEED FOR POA PAPERWORK IN ORDER TO HAVE ANY CONSENTS FOR PROCEDURES 
SIGNED SINCE THE PATIENT WAS NOT ABLE TO SIGN HERSELF, AND BY THE HIERACHY OF 
LAW, THE POA OR NEXT OF KIN WOULD BE WHO NEEDED TO SIGN AND SINCE THEY (THE 
LADY AND ELIAS) WERE NOT BLOOD RELATED, THEY COULD NOT SIGN FOR HER AS THEY 
HAVE BEEN. AT THIS TIME I WAS QUICKLY TOLD THAT SHE WAS IN HER RIGHT MIND AND 
ABLE TO MAKE ALL HER OWN DECISIONS. THE LADY AT BEDSIDE PROCEEDED TO TELL ME 
ABOUT ALL THE TIMES PEOPLE CAME IN AND QUESTIONED HER ABOUT THE DATE, TIME, 
PRESIDENT, ETC AND SHE WAS ABLE TO ALWAYS ANSWER ALL THEIR QUESTIONS. SHE 
STATED THE PATIENT WAS ABLE TO SIGN FOR HERSELF. UP TO THIS POINT, THE 
PATIENT HAD NOT OPENED HER EYES AND HAD NOT SAID ANYTHING. IT TOOK ME 
TOUCHING THE PATIENT'S ARM TO GET HER TO OPEN HER EYES AND ACKNOWLEDGE MY 
PRESENCE AND ANSWER MY QUESTIONS. THE PATIENT IS WITH IT AND ABLE TO ANSWER 
QUESTIONS. AT THIS POINT I ASKED HER IF SHE WANTED TO HAVE THE KYPHOPLASTY 
 
DONE. SHE STATED YES. THE LADY AT BEDSIDE BECAME VERY AGITATED STATING THAT 
SHE KNEW ABOUT HIPPA LAWS AND WANTED TO KNOW WHY ANYONE EVEN ALLOWED THINGS 
TO HAPPEN UP UNTIL NOW. NO ONE HAD ASKED FOR ANYONE'S DRIVERS LICENES TO 
VERIFY THEY WERE WHO THEY SAID THEY WERE. I TRIED TO EXPLAINE THAT EVEN WITH 
THAT, WE HAD NO WAY TO ACTUALLY VERIFY THAT WITH A DRIVERS LICENSE BECAUSE 
ANYONE CAN SAY THEY ARE SOMEONES FAMILY AND WE DON'T KNOW OTHERWISE UNLESS 
THE PATIENT SAYS SO, BUT SHE CUT ME OFF AS I WAS TALKING. I STOOD AT BEDSIDE 
AND LISTENED TO HER RANT ABOUT ALL THE THINGS THAT SHE HAS WITNESSED HERE AT 
THIS HOSPITAL THAT HAS MADE HER SICK TO HER STOMACH ESPECIALLY ABOUT THE FACT 
THAT SHE CAME IN HERE WITH A HURT BACK AND NOW THEY ARE JUST WATCHING HER DIE.
 I APOLOGIZED TO HER ABOUT ALL OF THE THINGS THAT [facility] WAS UPSET ABOUT, AND 
THEN DIRECTED MY CONVERSATION TO THE PATIENT, EXPLAINING THAT IF SHE WANTED 
THEY KYPHOPLASTY AND HER URINE WAS CLEAR THAT I WOULD EXPLAIN THIS TO THE 
NURSE PRACTITIONER. THAT ULTIMATELY I AM HERE TO TAKE CARE OF HER AND MAKE 
SURE WE ARE DOING WHAT SHE WANTS. I ALSO EXPLAINED TO HER THAT SHE NEEDS TO 
SIGN ALL OF HER OWN PAPERWORK, EVEN IF IT IS JUST WITH AN "X". THAT WE SIGN 
AS WITNESSES TO STATE WE KNOW SHE UNDERSTANDS AND SHE IS SIGNING. EXPLAINED 
THAT AS LONG AS SHE IS ABLE TO MAKE ALL HER OWN DECISIONS, WE DID NOT NEED 
HER POA PAPERWORK. SHE IS IN AGREEMENT. THE GREAT NEPHEWS WIFE WHO IS AT 
BEDSIDE CONTINUED WITH HER RANT, I AGAIN APOLOGIZED AND THE BEDSIDE NURSE AND 
I LEFT THE ROOM. I EXPLAINED TO SCOTTIE THAT THE PATIENT WAS CURRENTLY IN HER 
RIGHT MIND AND SHE IS WANTING THEY KYPHOPLASTY, AND SHE RECONSULTED IR TO SEE 
THE PATIENT.
DCP- Discharge Planning
 
Updated by [id number]: Melany Olivares on 19   4:25 pm CT
CM RECEIVED AN ORDER REGARDING PLANS TO DISCHARGE TO HOME W/ HOSPITAL BED , 
TUBE FEEDING AND HOME HEALTH. PATIENT'S NUTRITION NOTE IS 19. WILL NEED 
NUTRITION NOTE REGARDING PATIENT NEEDS FOR DISCHARGE. CM UNDERSTANDS THE 
PATIENT DOES EAT AND TAKE HER MEDICATIONS BY MOUTH. SHE IS PRESENTLY ON 
FEEDINGS VIA  PUMP. SHE HAS BEEN ADVANCED TO 60 CC/HR TODAY WHICH IS HER GOAL 
RATE.  
SPOKE WITH PRIMARY ELIUD AND SHE REPORTS NO RESIDUAL AT THIS TIME.  
WILL NEED TO HAVE [facility] PROVIDE BED AND SPECIALTY MATTRESS  FOR HOME 
AND   
DETERMINE COVERAGE FOR TUBE FEEDING AND EQUIPMENT.  
CM TO FOLLOW UP IN AM.
DCP- Discharge Planning
 
Updated by [id number]: Albaro Chambers on 19   7:14 am CT
Patient Name:  CHANTAL TIPTON   
Encounter No:  [id number]   
:  1931   
Primary Insurance:  HUMANA CHOICE PPO MCR ADVANT  
Anticipated DC Date: 2019   
Planned Disposition:  Skilled Nursing Facility  
External Planned Provider: [facility], MEDICARE REHAB BED 
  
  
DCP follow-up note: CM FAXED UPDATE TO [facility], 
[phone] FOR REHAB REQUEST.  
  
 
CM WAITING ADMISSION DETERMINATION FROM [facility] FOR 
REHAB PLACEMENT.  
  
ALBARO CHAMBERS, CASE MANAGEMENT
DCP- Discharge Planning
 
Updated by [id number]: Albaro Chambers on 19   3:46 pm CT
Patient Name:  CHANTAL TIPTON   
Encounter No:  [id number]   
:  1931   
Primary Insurance:  HUMANA CHOICE PPO MCR ADVANT  
Anticipated DC Date: 2019   
Planned Disposition:  Skilled Nursing Facility  
External Planned Provider:  [facility], MEDICARE REHAB 
BED  
  
  
DCP follow-up note: CM MET WITH PT'S TIANNA MCKEON BY MARRIAGE, AMARI BERMUDEZ, 
[phone].  GINGER STATES THAT JULIANNE BERMUDEZ IS PT'S FIANCE.  PT HAS POWER 
OF  WITH GREAT NEPHEW, GINGERS SPOUSE, MAGED BERMUDEZ, WHO WORKS IN 
[place].  GINGER WILL GET MAGED TO HOSPITAL ALONG WITH POWER OF  
PAPERWORK AS SOON AS POSSIBLE.  THEY ARE IN AGREEMENT WITH REHAB AT [place], BUT [facility] MAY NOT ACCEPT AS PT HAS CONTINUED DECLINE.  [facility] TO COME TOMORROW TO EVALUATE PT FOR REHAB.  IF [facility] DOES 
NOT ACCEPT FOR REHAB, THEY ARE THINKING TO TAKE PT HOME TO [facility] HERE 
IN [place] AND GINGER ALONG WITH FAMILY WILL CARE FOR PT WITH HOME 
HOSPICE.  GINGER REPORTS THEY WILL MAKE THAT DECISION TOMORROW.  CM EXPLAINED 
THAT THE POWER OF  WILL HAVE TO BE IN AGREEMENT AND SIGN ANY NEEDED 
AUTHORIZATIONS FOR ENROLLMENT IN REHAB OR HOSPICE CARE.  GINGER REPORTS 
UNDERSTANDING, PROVIDED CELL PHONE NUMBER FOR MAGED BERMUDEZ, [phone].  
  
CM WAITING ADMISSION DETERMINATION FROM [facility] FOR 
REHAB PLACEMENT.  
  
ALBARO CHAMBERS, CASE MANAGEMENT
 
DCP- Discharge Planning
 
Updated by [id number]: Albaro Chambers on 19   3:34 pm CT
Patient Name:  CHANTAL TIPTON   
Encounter No:  [id number]   
:  1931   
Primary Insurance:  HUMANA CHOICE PPO MCR ADVANT  
Anticipated DC Date: 2019   
Planned Disposition:  Skilled Nursing Facility  
External Planned Provider: [facility], MEDICARE REHAB BED 
  
  
DCP follow-up note: CM MET WITH PT AND SON/POA, JULIANNE BERMUDEZ, AT FAMILY 
REQUEST.  MR. BERMUDEZ REPORTS IT IS NOW TIME TO SEND REFERRAL TO [facility] 
FOR REHAB PLACEMENT.  IMPORTANT MESSAGE FROM MEDICARE PROVIDED AND EXPLAINED. 
  
 
[phone]   
  
CM WAITING ADMISSION DETERMINATION FROM [facility] FOR 
REHAB PLACEMENT.  
  
ALBARO CHAMBERS, CASE MANAGEMENT
DCP- Discharge Planning
 
Updated by [id number]: Albaro Chambers on 19   4:52 pm CT
Patient Name: CHANTAL TIPTON                                     
Admission Status: Elective   
Accout number: [id number]                              
Admission Date: 2019   
: 1931                                                        
Admission Diagnosis:ACUTE KIDNEY FAILURE, UNSPECIFIED   
Attending: SHARATH TORRES                                                
Current LOS:  2   
  
Anticipated DC Date:    
Planned Disposition: Skilled Nursing Facility   
Primary Insurance: HU[employer]NA CHOICE PPO MCR ADVANT   
PLANNED EXTERNAL PROVIDER:  [facility], MEDICARE REHAB 
BED  
  
Discharge Planning Comments:   
CM RECEIVED ORDER INDICATING PT WANTS NURSING HOME CARE.  CM MET WITH PT AND 
SON IN ROOM TO DISCUSS DISCHARGE PLANNING AND NEEDS. PT REPORTS THAT BEFORE 
GETTING SICK, SHE WAS LIVING AT HOME WITH HER SON, INDEPENDENT IN HER CARE.  
PT REPORTS SHE DID TELL THE DOCTOR THAT SHE WOULD BE BETTER OFF IN A NURSING 
HOME BEFORE FAMILY ARRIVED TODAY.  PT WANTS REHAB, NOT LONG TERM CARE.   PT 
HAS NO MEDICAL EQUIPMENT AND NO OUTSIDE SERVICES ASSISTING IN THE HOME. CM 
DISCUSSED AVAILABILITY OF HOME HEALTH, REHAB SERVICES AND MEDICAL EQUIPMENT. 
PT'S SON INITIALLY DECLINED TO PARTICIPATE IN DISCHARGE PLANNING REPORTING PT 
WAS SENT TO REHAB DOWNSTAIRS TOO SOON WHEN SHE WAS NOT ABLE TO WALK OR EVEN 
FEED HERSELF. SON WANTS PT CONSIDERED FOR INPATIENT REHAB AGAIN STATING THEY 
SENT HER BACK HERE BECAUSE SHE USED ALL OF HER DAYS DOWN THERE.  PT DOES NOT 
THINK SHE CAN PARTICIPATE IN THREE HOURS OF PROGRESSIVE THERAPY PER DAY.  CM 
EXPLAINED THAT IF PT IS NOT ABLE TO PARTICIPATE IN THE THREE HOURS OF 
PROGRESSIVE THERAPY, IS NOT ABLE TO STAND OR FEED HERSELF, SHE IS NOT GOING 
TO BE APPROPRIATE FOR READMISSION TO INPATENT REHAB.  CM DISCUSSED 
AVAILABILITY OF SKILLED NURSING REHAB SERVICES AND PROVIDED CHOICE FORM.  
PT'S SON REPORTS HE ALREADY SIGNED ONE OF THESE FOR [facility] THE LAST 
VISIT AND SPEAKING WITH THIS CM.  CM EXPLAINED A NEW ONE WAS NEEDED.  PT'S 
SON SIGNED THE FORM AND DOES NOT WANT PT SENT TO REHAB BEFORE SHE IS STABLE.  
CM EXPLAINED THAT ALL DOCTORS WOULD HAVE TO INDICATE PT IS READY TO DISCHARGE 
AND CM IS ONLY WANTING TO BE PROACTIVE ON PT'S BEHALF FOR DISCHARGE PLANNING 
AND SECURE REHAB BED AHEAD OF TIME TO ENSURE THAT PT IS ABLE TO GET INTO [facility] WHEN DISCHARGED AS [facility] IS POPULAR REHAB AND DOES FILL UP.  
PT'S SON WILL WANTS PT TO BE STABLE FOR DISCHARGE FOR CM TO SEND REFERRALS.  
CHOICE SIGNED. CM PROVIDED PT'S SON WITH CM CONTACT INFORMATION.  
  
CM TO SEND REFERRAL TO [facility] WHEN PROJECTED 
 
DISCHARGE DATE IS KNOWN, AS PT'S SON DOES NOT WANT REHAB REFERRAL SENT OUT 
PRIOR TO PT'S MEDICAL STABILITY FOR DISCHARGE TO REHAB.  
  
: Albaro Chambers
 
 DCPIA - Discharge Planning Initial Assessment
 
Updated by [id number]: Albaro Chambers on 19   1:21 pm
*  Is the patient Alert and Oriented?
Yes
*  How many steps to enter\exit or inside your home? NONE *  PCP DR. MCCALL *  Pharmacy
[facility]
*  Preadmission Environment
Acute Inpatient Rehab
*  Facility Name
[facility] IN[facility]
*  ADLs
Total Dependent
*  Equipment
None
*  Other Equipment
NO MEDICAL EQUIPMENT PROVIDER PREFERECE
*  List name and contact numbers for known caregivers / representatives who 
currently or will assist patient after discharge:
JULIANNE ELIAS BERMUDEZ [phone]
*  Verbal permission to speak to the caregivers and representatives has been 
obtained from the patient.
Yes
*  Community resources currently utilized
None
*  Please name any agencies selected above.
NONE
*  Additional services required to return to the preadmission environment?
Yes
*  Can the patient safely return to the preadmission environment?
Yes
*  Has this patient been hospitalized within the prior 30 days at any 
hospital?
Yes
 
 
 
 
 
Coverage Notice
 
Reviewer: [id number] Chambers
 
Notice Issued Date-Time: 2019  13:00
Notice Type: Patient Choice Letter
 
Notice Delivered To: Family Member
 
Relationship to Patient: Other Relationship
Representative Name: JULIANNE BERMUDEZ
 
Delivery Method: HAND - Hand Delivered
Ariadne Days:
Prior Verbal Notification: 
 
Recipient Understood Notice: Yes
Recipient Signature: Yes
Med Rec Note Co-signed by Attending:
 
Coverage Notice Comment:  [facility]
Reviewer: [email or web address] Gopi Chambers
 
Notice Issued Date-Time: 2019  12:20
Notice Type: IM Discharge Notice
 
Notice Delivered To: Family Member
Relationship to Patient: Other Relationship
Representative Name: JULIANNE BERMUDEZ
 
Delivery Method: HAND - Hand Delivered
Ariadne Days:
Prior Verbal Notification: 
 
Recipient Understood Notice: Yes
Recipient Signature: Yes
Med Rec Note Co-signed by Attending:
 
Coverage Notice Comment:  
Reviewer: [email or web address] Gopi Chambers
 
Notice Issued Date-Time: 2019  16:20
Notice Type: IM Discharge Notice
 
Notice Delivered To: Family Member
Relationship to Patient: Other Relationship
Representative Name: AMARI BERMUDEZ
 
Delivery Method: HAND - Hand Delivered
Ariadne Days:
Prior Verbal Notification: 
 
Recipient Understood Notice: Yes
Recipient Signature: Yes
Med Rec Note Co-signed by Attending:
 
Coverage Notice Comment:  
Reviewer: [email or web address] Gopi Chambers
 
Notice Issued Date-Time: 2019  16:20
Notice Type: Patient Choice Letter
 
 
Notice Delivered To: Family Member
Relationship to Patient: Other Relationship
Representative Name: AMARI BERMUDEZ
 
Delivery Method: HAND - Hand Delivered
Ariadne Days:
Prior Verbal Notification: 
 
Recipient Understood Notice: Yes
Recipient Signature: Yes
Med Rec Note Co-signed by Attending:
 
Coverage Notice Comment:  [facility]
 
Last DP export: 19  11:10 a
Patient Name: CHANTAL TIPTON
Encounter #: [id number]
Page [id number]
 
 
 
 
 
Electronically Signed by CHELE RAMON on 19 at 0749
 
 
 
 
 
 
**All edits/amendments must be made on the electronic document**
 
DICTATION DATE: 19 0749     : KARY  19 0749     
RPT#: [id number]                                DC DATE:        
                                               STATUS: ADM IN  
[facility]
[address] [place] [address]
***END OF REPORT***

## 2019-01-24 NOTE — NUR
DISCHARGE INSTRUCTIONS REVIEWED WITH PTS NEICE AND VOICES UNDERSTANDING. HAD
SOME QUESTIONS FOR DR. LEPE AND HE WENT IN TO SPEAK WITH HER AND
ANSWERED HER QUESTIONS.

## 2019-01-24 NOTE — NUR
DISCHARGE ORDERS RECEIVED.  LINDA STATES SHE DID NOT HAVE ALEXANDER AT HOME.
ALEXANDER REMOVED WITHOUT DIFFICULTY.  PT HAS BEEN UP IN CHAIR SINCE 10 AM AND IS
READY TO GO BACK TO BED.  PT HERE TO PUT HER BACK IN BED, TOLERATED WELL.
RIGHT CHEST CVL REMOVED BY EUGENIA GRESHAM RN WITH OUT DIFFICULTY.

## 2019-01-24 NOTE — NUR
PT RESTING COMFORTABLY IN BED. RESPIRATIONS EVEN AND UNLABORED. PT FEEDING
PUMP RESTARTED AT 40ML/HR WITH 100ML FLUSH PER HOUR. 600ML OF 1.5CAL JEVITY IN
BAG WITH 600ML OF WATER. RESIDUAL 10ML. PT TOLERATING WELL. PT DENIES ANY
NEEDS OR PAIN AT THIS TIME. DRESSING TO LEFT SIDE OF BACK CLEAN DRY AND
INTACT. REPOSTIONED PT TO RIGHT SIDE. WILL CONTINUE TO MONITOR.

## 2019-01-24 NOTE — [facility]
CASE MANAGEMENT DISCHARGE SUMMARY
 
 
PATIENT: CHANTAL TIPTON                        UNIT: [id number]
ACCOUNT#: [id number]                       ADM DATE: 19
AGE: 88     : [birth year]  SEX: F            ROOM/BED: D.2112    
AUTHOR: YENNY,DOC                             PHYSICIAN:                               
 
REFERRING PHYSICIAN: SHARATH TORRES MD               
DATE OF SERVICE: 19
Discharge Plan
 
 
Patient Name: CHANTAL TIPTON
Facility: [facility]:[place]
Encounter #: [id number]
Medical Record #: [id number]
: 1931
Planned Disposition: Home with Home Health
Anticipated Discharge Date: 19
 
Discharge Date: 
Expected LOS: 22
Initial Reviewer: WILLIAN
Initial Review Date: 2019
Generated: 19   3:55 pm 
Comments
 
DCP- Discharge Planning
 
Updated by [id number]: Albaro Chambers on 19   1:54 pm CT
Patient Name:  CHANTAL TIPTON   
Encounter No:  [id number]   
:  1931   
Primary Insurance:  HU[employer] CHOICE PPO [facility] ADVANT  
Anticipated DC Date: 2019   
Planned Disposition:  Home with Home Health  
External Planned Provider: [employer] [facility]  
  
  
DCP follow-up note: CM RECEIVED DISCHARGE ORDER, SPOKE TO PT'S GRAND NIECE IN 
[place] WHO INFORMED CM THAT EVERYTHING WAS DELIVERED AND READY FOR PT'S 
DISCHARGE TODAY.  CM CALLED AND NOTIFIED FILI [facility], 
[phone],  FAXED DISCHARGE INFORMATION TO [employer] [phone].  [facility] 
TO ADMIT PT FOR HOME HEALTH SERVICES TOMORROW.    
  
MORGAN CHAMBERS, CASE MANAGEMENT  
  
  
Albaro Chambers, CASE MANAGEMENT  
 
Albaro Chambers
DCP- Discharge Planning
 
Updated by [id number]: Albaro Chambers on 19   6:55 am CT
Patient Name:  CHANTAL TIPTON   
Encounter No:  [id number]   
:  1931   
Primary Insurance:  [employer] CHOICE PPO [facility] ADVANT  
Anticipated DC Date: 2019   
Planned Disposition:  Home with Home Health  
External Planned Provider: [facility] [facility]  
  
  
DCP follow-up note: CM REVIEWED CHART, PT DID NOT DISCHARGE HOME YESTERDAY.  
CM FAXED UPDATE TO [employer] [phone].    
  
NOTIFY [employer] [facility], [phone], OF DISCHARGE.  FAX DISCHARGE TO [employer] 
AT, [phone].  
  
  
Albaro Chambers CASE MANAGEMENT
DCP- Discharge Planning
 
Updated by [id number]: Albaro Chambers on 19  11:07 am CT
Patient Name:  CHANTAL TIPTON   
Encounter No:  [id number]   
:  1931   
Primary Insurance:  [employer] CHOICE PPO [facility] ADVANT  
Anticipated DC Date: 2019   
Planned Disposition:  Home with Home Health  
External Planned Provider: [employer] [facility]  
  
  
DCP follow-up note: CM SPOKE TO PT IN ROOM THIS MORNING, PT REPORTS SHE IS 
HAVING KYPHOPLASTY TODAY BEFORE GOING HOME.  CM INFORMED PT THAT QUOTE FOR 
TUBE FEEDING MATERIALS RECEIVED, CM PROVIDED TO PT.  PT REPORTS SHE WILL 
NOTIFY GINGER THIS MORNING WHEN SHE GETS HERE.  PT DENIES NEEDS.    
  
CM SPOKE TO AMARI BERMUDEZ, [phone], IN PERSON AT NURSES STATION.  GINGER 
REPORTS THAT [facility] HAS MADE ARRAGMENTS FOR DELIVERY OF THE HOSPITAL BED AND 
BEDSIDE COMMODE TODAY.  SHE HAS NOT HEARD FROM [facility] ABOUT TUBE FEEDING; 
SHE DID RECEIVE THE QUOTE FOR PRIVATE PAY AND WANTS TO MAKE THE ARRAGNEMENTS. 
 GINGER HAS NOT CALLED [facility] HERSELF.  CM CALLED [facility], [phone], NOTIFED DAVIN THAT PT'S FAMILY WANTS TO PRIVATE 
PAY THE PUMP AND FEEDING SUPPLIES AND ASKED HER TO CALL GINGER AS SOON AS 
POSSIBLE AND MAKE ARRANGEMENTS FOR DELIVERY TODAY.  MALATHI REPORTS MATERIALS 
AND SUPPLIES ARE CASH ON DELIVERY AND SHE WILL CALL GINGER IMMEDIATELY TO 
MAKE DELIVERY AND EDUCATION ARRANGEMENTS.  
  
NOTIFY [facility], [phone], OF DISCHARGE.  FAX DISCHARGE TO [employer] 
, [phone].  
  
 
  
Albaro Chambers CASE MANAGEMENT
DCP- Discharge Planning
 
Updated by [id number]: Albaro Chambers on 19   3:06 pm CT
Patient Name:  CHANTAL TIPTON   
Encounter No:  [id number]   
:  1931   
Primary Insurance:  [employer] CHOICE PPO [facility] ADVANT  
Anticipated DC Date: 2019   
Planned Disposition:  Home with Home Health  
External Planned Provider: [facility] [facility]  
  
  
DCP follow-up note: CM CALLED DWAINE AT [facility], [phone], PROVIDED 
INFORMATION FOR HOSPITAL BED AND BEDSIDE COMMODE, INFORMED OF PLANNED 
DISCHARGE TOMORROW.  CM FAXED ORDER AND REFERRAL INFORMATION TO [facility] AT 
[phone].  [facility] TO ARRANGE DELIVERY OF MEDICAL EQUIPMENT WITH PT'S 
FAMILY.  
  
CM CALLED [facility], [phone], SPOKE TO MARGARET 
REGARDING TUBE FEEDING ORDER; MARGARET ADVISED THAT PT'S INSURANCE WILL NOT PAY 
FOR SUPPLEMENTAL NUTRITION AS PT IS ON REGULAR DIET AND ABLE TO EAT BY MOUTH. 
 MARGARET WILL REVIEW REFERRAL TO SEE IF THERE IS SOME WAY THAT INSURANCE MAY 
COVER THE SERVICE.  CM SPOKE TO PT IN ROOM, INFORMED OF ABOVE; PT ASKED FOR 
CM TO GET CASH PAY PRICE FOR TUBE FEEDING AND DISCUSS WITH GINGER, HER NIECE. 
 CM FAXED REFERRAL TO [facility] [phone].  
  
CM CALLED [facility] [facility], [phone], SPOKE TO FILI, PROVIDED 
REFERRAL INFORMATION AND ADVISED OF PLANNED DISCHARGE 19.  THEY WILL 
ACCEPT PT FOR ADMIT ON 19.  CM FAXED HOME HEALTH REFERRAL TO [employer] AT 
[phone].  CM SPOKE TO GINGER MILLS IN [place] AS DIRECTED BY PT; 
INFORMED OF ABOVE ARRANGEMENTS.  CM SPOKE TO BEDSIDE NURSE REGARDING FAMILY 
CONCERN OF OXYGEN NEED, BEDSIDE NURSE INFORMED CM THAT SHE WOULD ATTEMPT 
OXYGEN WEANING.  
  
[facility] ARRANGING HOSPITAL BED AND BEDSIDE COMMODE WITH FAMILY.
  PT'S INSURANCE WILL NOT PAY FOR TUBE FEEDING MATERIALS OR SUPPLIES - 
[facility] / [facility] GETTING QUOTE FOR CASH PAY.  [facility] 
[facility] WILL ACCEPT PT AT DISCHARGE.    
  
FOR DISCHARGE, NOTIFY [employer] [facility] [phone], FAX DISCHARGE 
INFORMATION TO [employer] [phone].  CM TO CONTINUE TO FOLLOW AND ASSIST AS 
NEEDED.  
  
  
Albaro Chambers, CASE MANGEMENT  
  
Appended by Albaro Chambers on 2019 16:06 CST:  
CM SPOKE TO PT'S GREAT NIECE IN [place] WHO INFORMED CM THAT [facility] HAS CALLED 
TO ARRANGE DELIVERY OF HOSPITAL BED AND BEDSIDE COMMODE.  SHE HAS NOT HEARD 
FROM [facility] REGARDING TUBE FEEDING.  CM CALLED [facility], [phone], SPOKE TO DAVIN WHO REPORTS INSURANCE 
WILL NOT COVER COSTS OF TUBE FEEDING AND PROVIDED QUOTE OVER THE PHONE AND 
WILL FAX QUOTE TO  FOR PT'S FAMILY.  BEDSIDE NURSE NOTIFIED.  PT'S FAMILY 
ARE NOT IN ROOM, CM LEFT NOTE ON WHITE BOARD FOR FAMILY NOTIFYING THAT TUBE 
FEEDING COSTS ARE NOT COVERED BY INSURANCE WITH QUOTED PRICES.  
  
[facility] ARRANGING HOSPITAL BED AND BEDSIDE COMMODE WITH FAMILY.
  PT'S INSURANCE WILL NOT PAY FOR TUBE FEEDING MATERIALS OR SUPPLIES - 
[facility] / [facility] QUOTED FOR CASH PAY OF $8 PER DAY FOR 
PUMP AND SUPPLIES / $1.50 PER CAN FOR JEVITY 1.2.  [facility] [facility] WILL 
ACCEPT PT AT DISCHARGE.    
  
FOR DISCHARGE, NOTIFY [facility] -[phone], FAX DISCHARGE 
INFORMATION TO [facility] [phone].  CM TO CONTINUE TO FOLLOW AND ASSIST AS 
NEEDED.  
  
  
Albaro Chambers, CASE MANGEMENT
DCP- Discharge Planning
 
Updated by [id number]: Albaro Chambers on 19   4:27 pm CT
Patient Name:  CHANTAL TIPTON   
Encounter No:  [id number]   
:  1931   
Primary Insurance:  [employer] CHOICE PPO [facility] ADVANT  
Anticipated DC Date: 2019   
Planned Disposition:  HOME WITH HOME HEALTH  
External Planned Provider: [facility]  
  
  
DCP follow-up note: CM RECEIVED ORDER FOR HOME HEALTH, TUBE FEEDING AND 
HOSPITAL BED.  CM MET WITH PT AND GRAND NEPHEW'S SPOUSE IN ROOM.  CM BEGAN 
DISCUSSING ORDER, PT'S GRAND NEPHEW'S SPOUSE INFORMED CM THAT PT PLANS TO 
DISCHARGE TO [facility] FOR REHAB AND ASKED IF CM HAS HEARD FROM [facility] AS SHE DID NOT SEE THEM FRIDAY AS THEY WERE SUPPOSED TO EVALUATE PT 
THEN.  [facility], SPOKE TO LATONYA WHO WILL 
CHECK WITH MIRANDA TO FIND OUT WHAT HAS HAPPENED.  LATONYA WILL HAVE MIRANDA CALL 
CM AS SOON AS POSSIBLE.    
CM MET WITH PT, PT'S ELIAS AND PT'S GRAND NEPHEW'S NIECE.  PT'S ELIAS 
INFORMED CM THAT CM DOES NOT NEED TO SEE THEM AND THAT DR. LEPE HAS 
TAKEN CARE OF EVERYTHING. CM ASKED WHAT HAD BEEN WORKED OUT.  CM WAS ADVISED 
THAT PT WILL HAVE THE KYPHOPLASTY ON WEDNESDAY AND THEN GO TO [facility] 
FOR REHAB, DR. LEPE WILL TALK TO MIRANDA AT [facility] AND WORK THIS 
OUT.  CM NOTIFIED LIZZY EMANUEL AND DR. LEPE.    
  
CM RECEIVED CALL BACK FROM MIRANDA WHO SPOKE TO FAMILY, INFORMED THEM THAT PT 
IS NOT APPROPRIATE CANDIDATE FOR REHAB AS SHE IS REFUSING THERAPY SERICES AND 
 INFORMED CM THAT FAMILY HAS REFUSED LONG TERM CARE PLACEMENT AT [facility] AND REHAB.    
  
CM MET WITH PT AND PT'S GRAND NEPHEWS SPOUSE IN ROOM.  CM DISCUSSED IMPORTANT 
MESSAGE FROM MEDICARE AND DISCUSSED HOW TO COMPLAIN IF NEEDED TO HOUSE 
 
SUPERVISOR, ADMINISTRATION AND IF NEEDED, QUALITY IMPROVEMENT OFFICE FOR 
MEDICARE.  JOHNNIEDENNIS INFORMED CM THAT SHE HAS COMPLAINED TO THE DOCTORS BUT HAS 
BEEN THINKING SHE NEEDS TO COMPLAIN TO SOMEONE ELSE. CM DISCUSSED DISCHARGE 
PLAN.  GINGER HAS TALKED TO PT, PT'S ELIAS AND MIRANDA AT [facility].  THEY 
ARE NOT GOING TO PUT PT INTO LONG TERM CARE AND WILL BE TAKING PT HOME AS 
DISCUSSED LAST WEEK; GINGER STATES PT IS NOT READY FOR HOSPICE BUT WANTS HOME 
HEALTH.  HOME HEALTH LISTING PROVIDED AND DISCUSSED, GINGER WANTS TO USE THE 
HIGHEST RATED COMPANY, PT TOLD GINGER TO SIGN THE FORMS.  GINGER SIGNED THE 
IMPORTANT MESSAGE FROM MEDICARE AND CHOICE FOR [employer] [facility].  JOHNNIEDENNIS 
STATES THAT THEY WILL NEED A HOSPITAL BED, BEDSIDE COMMODE, OXYGEN AND TUBE 
FEEDING SET UP FOR PT AT HOME. WHITNEY EXPLAINED THAT PT'S INSURANCE HAS TO PRE 
AUTHORIZE ALL MEDICAL EQUIPMENT AND THAT SINCE PT IS ABLE TO EAT, INSURANCE 
MAY NOT COVER TUBE FEEDING.  THEY HAVE NO PREFERENCE ON PROVIDER AND GINGER 
ASKED TO BE CONTACTED REGARDING ANY COPAYS FOR EQUIPMENT [phone].  
  
CM HAS ORDER FOR HOME HEALTH, HOSPITAL BED AND TUBE FEEDING.  CM WILL NEED 
ADDITIONAL ORDERS FOR BEDSIDE COMMODE AND OXYGEN TESTING.    
  
CM TO FIND IN NETWORK PROVIDER FOR PT'S INSURANCE TO ARRANGE NEEDED MEDICAL 
EQUIPMENT AS SOON AS POSSIBLE.  CM TO COMPLETE HOME HEALTH ARRANGEMENTS WITH 
eTelemetry.  
  
Albaro Chambers
DCP- Discharge Planning
 
Updated by [id number]: Marli Rancho on 19  11:53 am CT
RECEIVED NOTICE THAT THE PATIENTS FAMILY WAS WANTING HER TO HAVE THE 
KYPHOPLASTY BEFORE SHE LEAVES THE HOSPITAL. IT WAS MENTIONED THAT THE 
PATIENTS POA HAS NOT BEEN HER AND THERE IS NO POA PAPERWORK IN THE CHART. I 
ASKED THE BEDSIDE NURSE PENG ENCARNACION TO COME INTO THE ROOM WITH ME TO DISCUSS 
THE NEED FOR POA PAPERWORK IN ORDER TO HAVE ANY CONSENTS FOR PROCEDURES 
SIGNED SINCE THE PATIENT WAS NOT ABLE TO SIGN HERSELF, AND BY THE HIERACHY OF 
[place], THE POA OR NEXT OF KIN WOULD BE WHO NEEDED TO SIGN AND SINCE THEY (THE 
LADY AND ELIAS) WERE NOT BLOOD RELATED, THEY COULD NOT SIGN FOR HER AS THEY 
HAVE BEEN. AT THIS TIME I WAS QUICKLY TOLD THAT SHE WAS IN HER RIGHT MIND AND 
ABLE TO MAKE ALL HER OWN DECISIONS. THE LADY AT BEDSIDE PROCEEDED TO TELL ME 
ABOUT ALL THE TIMES PEOPLE CAME IN AND QUESTIONED HER ABOUT THE DATE, TIME, 
PRESIDENT, ETC AND SHE WAS ABLE TO ALWAYS ANSWER ALL THEIR QUESTIONS. SHE 
STATED THE PATIENT WAS ABLE TO SIGN FOR HERSELF. UP TO THIS POINT, THE 
PATIENT HAD NOT OPENED HER EYES AND HAD NOT SAID ANYTHING. IT TOOK ME 
TOUCHING THE PATIENT'S ARM TO GET HER TO OPEN HER EYES AND ACKNOWLEDGE MY 
PRESENCE AND ANSWER MY QUESTIONS. THE PATIENT IS WITH IT AND ABLE TO ANSWER 
QUESTIONS. AT THIS POINT I ASKED HER IF SHE WANTED TO HAVE THE KYPHOPLASTY 
DONE. SHE STATED YES. THE LADY AT BEDSIDE BECAME VERY AGITATED STATING THAT 
SHE KNEW ABOUT HIPPA LAWS AND WANTED TO KNOW WHY ANYONE EVEN ALLOWED THINGS 
TO HAPPEN UP UNTIL NOW. NO ONE HAD ASKED FOR ANYONE'S DRIVERS LICENES TO 
VERIFY THEY WERE WHO THEY SAID THEY WERE. I TRIED TO EXPLAINE THAT EVEN WITH 
THAT, WE HAD NO WAY TO ACTUALLY VERIFY THAT WITH A DRIVERS LICENSE BECAUSE 
ANYONE CAN SAY THEY ARE SOMEONES FAMILY AND WE DON'T KNOW OTHERWISE UNLESS 
THE PATIENT SAYS SO, BUT SHE CUT ME OFF AS I WAS TALKING. I STOOD AT BEDSIDE 
AND LISTENED TO HER RANT ABOUT ALL THE THINGS THAT SHE HAS WITNESSED HERE AT 
THIS HOSPITAL THAT HAS MADE HER SICK TO HER STOMACH ESPECIALLY ABOUT THE FACT 
 
THAT SHE CAME IN HERE WITH A HURT BACK AND NOW THEY ARE JUST WATCHING HER DIE.
 I APOLOGIZED TO HER ABOUT ALL OF THE THINGS THAT [facility] WAS UPSET ABOUT, AND 
THEN DIRECTED MY CONVERSATION TO THE PATIENT, EXPLAINING THAT IF SHE WANTED 
THEY KYPHOPLASTY AND HER URINE WAS CLEAR THAT I WOULD EXPLAIN THIS TO THE 
NURSE PRACTITIONER. THAT ULTIMATELY I AM HERE TO TAKE CARE OF HER AND MAKE 
SURE WE ARE DOING WHAT SHE WANTS. I ALSO EXPLAINED TO HER THAT SHE NEEDS TO 
SIGN ALL OF HER OWN PAPERWORK, EVEN IF IT IS JUST WITH AN "X". THAT WE SIGN 
AS WITNESSES TO STATE WE KNOW SHE UNDERSTANDS AND SHE IS SIGNING. EXPLAINED 
THAT AS LONG AS SHE IS ABLE TO MAKE ALL HER OWN DECISIONS, WE DID NOT NEED 
HER POA PAPERWORK. SHE IS IN AGREEMENT. THE GREAT NEPHEWS WIFE WHO IS AT 
BEDSIDE CONTINUED WITH HER RANT, I AGAIN APOLOGIZED AND THE BEDSIDE NURSE AND 
I LEFT THE ROOM. I EXPLAINED TO SCOTTIE THAT THE PATIENT WAS CURRENTLY IN HER 
RIGHT MIND AND SHE IS WANTING THEY KYPHOPLASTY, AND SHE RECONSULTED IR TO SEE 
THE PATIENT.
DCP- Discharge Planning
 
Updated by [id number]: Melany Olivares on 19   4:25 pm CT
CM RECEIVED AN ORDER REGARDING PLANS TO DISCHARGE TO HOME W/ HOSPITAL BED , 
TUBE FEEDING AND HOME HEALTH. PATIENT'S NUTRITION NOTE IS 19. WILL NEED 
NUTRITION NOTE REGARDING PATIENT NEEDS FOR DISCHARGE. CM UNDERSTANDS THE 
PATIENT DOES EAT AND TAKE HER MEDICATIONS BY MOUTH. SHE IS PRESENTLY ON 
FEEDINGS VIA  PUMP. SHE HAS BEEN ADVANCED TO 60 CC/HR TODAY WHICH IS HER GOAL 
RATE.  
SPOKE WITH PRIMARY ELIUD AND SHE REPORTS NO RESIDUAL AT THIS TIME.  
WILL NEED TO HAVE [facility] PROVIDE BED AND SPECIALTY MATTRESS  FOR HOME 
AND   
DETERMINE COVERAGE FOR TUBE FEEDING AND EQUIPMENT.  
CM TO FOLLOW UP IN AM.
DCP- Discharge Planning
 
Updated by [id number]: Albaro Chambers on 19   7:14 am CT
Patient Name:  CHANTAL TIPTON   
Encounter No:  [id number]   
:  1931   
Primary Insurance:  HUMANA CHOICE PPO [facility] ADVANT  
Anticipated DC Date: 2019   
Planned Disposition:  Skilled Nursing Facility  
External Planned Provider: [facility], MEDICARE REHAB BED 
  
  
DCP follow-up note: CM FAXED UPDATE TO [facility], 
[phone] FOR REHAB REQUEST.  
  
CM WAITING ADMISSION DETERMINATION FROM [facility] FOR 
REHAB PLACEMENT.  
  
ALBARO CHAMBERS, CASE MANAGEMENT
DCP- Discharge Planning
 
Updated by [id number]: Albaro Chambers on 19   3:46 pm CT
Patient Name:  CHANTAL TIPTON   
Encounter No:  [id number]   
 
:  1931   
Primary Insurance:  [employer] CHOICE PPO [facility] ADVANT  
Anticipated DC Date: 2019   
Planned Disposition:  Skilled Nursing Facility  
External Planned Provider:  [facility], MEDICARE REHAB 
BED  
  
  
DCP follow-up note: CM MET WITH PT'S TIANNA MCKEON BY AMARI MYLES, 
[phone].  GINGER STATES THAT JULIANNE BERMUDEZ IS PT'S FIANCE.  PT HAS POWER 
OF  WITH GREAT NEPHEW, GINGERS SPOUSE, MAGED BERMUDEZ, WHO WORKS IN 
[employer].  GINGER WILL GET MAGDE TO HOSPITAL ALONG WITH POWER OF  
PAPERWORK AS SOON AS POSSIBLE.  THEY ARE IN AGREEMENT WITH REHAB AT [facility], BUT [facility] MAY NOT ACCEPT AS PT HAS CONTINUED DECLINE.  [facility] TO COME TOMORROW TO EVALUATE PT FOR REHAB.  IF [facility] DOES 
NOT ACCEPT FOR REHAB, THEY ARE THINKING TO TAKE PT HOME TO [facility] HERE 
IN [place] AND GINGER ALONG WITH FAMILY WILL CARE FOR PT WITH HOME 
HOSPICE.  GINGER REPORTS THEY WILL MAKE THAT DECISION TOMORROW.  CM EXPLAINED 
THAT THE POWER OF  WILL HAVE TO BE IN AGREEMENT AND SIGN ANY NEEDED 
AUTHORIZATIONS FOR ENROLLMENT IN REHAB OR HOSPICE CARE.  GINGER REPORTS 
UNDERSTANDING, PROVIDED CELL PHONE NUMBER FOR MAGED BERMUDEZ, [phone].  
  
CM WAITING ADMISSION DETERMINATION FROM [facility] FOR 
REHAB PLACEMENT.  
  
NEERU VILLATORO
 
DCP- Discharge Planning
 
Updated by [id number]: Albaro Chambers on 19   3:34 pm CT
Patient Name:  CHANTAL TIPTON   
Encounter No:  [id number]   
:  1931   
Primary Insurance:  [employer] CHOICE PPO [facility] ADVANT  
Anticipated DC Date: 2019   
Planned Disposition:  Skilled Nursing Facility  
External Planned Provider: [facility], MEDICARE REHAB BED 
  
  
DCP follow-up note: CM MET WITH PT AND SON/POA, JULIANNE BERMUDEZ, AT FAMILY 
REQUEST.  MR. BERMUDEZ REPORTS IT IS NOW TIME TO SEND REFERRAL TO [facility] 
FOR REHAB PLACEMENT.  IMPORTANT MESSAGE FROM MEDICARE PROVIDED AND EXPLAINED. 
  
CM FAXED REFERRAL TO [facility], [phone].   
  
CM WAITING ADMISSION DETERMINATION FROM [facility] FOR 
REHAB PLACEMENT.  
  
NEERU VILLATORO
DCP- Discharge Planning
 
Updated by [id number]: Albaro Chambers on 19   4:52 pm CT
 
Patient Name: CHANTAL TIPTON                                     
Admission Status: Elective   
Accout number: [id number]                              
Admission Date: 2019   
: 1931                                                        
Admission Diagnosis:ACUTE KIDNEY FAILURE, UNSPECIFIED   
Attending: SHARATH TORRES                                                
Current LOS:  2   
  
Anticipated DC Date:    
Planned Disposition: Skilled Nursing Facility   
Primary Insurance: [employer] CHOICE PPO [facility] ADVANT   
PLANNED EXTERNAL PROVIDER:  [facility], MEDICARE REHAB 
BED  
  
Discharge Planning Comments:   
CM RECEIVED ORDER INDICATING PT WANTS NURSING HOME CARE.  CM MET WITH PT AND 
SON IN ROOM TO DISCUSS DISCHARGE PLANNING AND NEEDS. PT REPORTS THAT BEFORE 
GETTING SICK, SHE WAS LIVING AT HOME WITH HER SON, INDEPENDENT IN HER CARE.  
PT REPORTS SHE DID TELL THE DOCTOR THAT SHE WOULD BE BETTER OFF IN A NURSING 
HOME BEFORE FAMILY ARRIVED TODAY.  PT WANTS REHAB, NOT LONG TERM CARE.   PT 
HAS NO MEDICAL EQUIPMENT AND NO OUTSIDE SERVICES ASSISTING IN THE HOME. CM 
DISCUSSED AVAILABILITY OF HOME HEALTH, REHAB SERVICES AND MEDICAL EQUIPMENT. 
PT'S SON INITIALLY DECLINED TO PARTICIPATE IN DISCHARGE PLANNING REPORTING PT 
WAS SENT TO REHAB DOWNSTAIRS TOO SOON WHEN SHE WAS NOT ABLE TO WALK OR EVEN 
FEED HERSELF. SON WANTS PT CONSIDERED FOR INPATIENT REHAB AGAIN STATING THEY 
SENT HER BACK HERE BECAUSE SHE USED ALL OF HER DAYS DOWN THERE.  PT DOES NOT 
THINK SHE CAN PARTICIPATE IN THREE HOURS OF PROGRESSIVE THERAPY PER DAY.  CM 
EXPLAINED THAT IF PT IS NOT ABLE TO PARTICIPATE IN THE THREE HOURS OF 
PROGRESSIVE THERAPY, IS NOT ABLE TO STAND OR FEED HERSELF, SHE IS NOT GOING 
TO BE APPROPRIATE FOR READMISSION TO INPATENT REHAB.  CM DISCUSSED 
AVAILABILITY OF SKILLED NURSING REHAB SERVICES AND PROVIDED CHOICE FORM.  
PT'S SON REPORTS HE ALREADY SIGNED ONE OF THESE FOR [facility] THE LAST 
VISIT AND SPEAKING WITH THIS CM.  CM EXPLAINED A NEW ONE WAS NEEDED.  PT'S 
SON SIGNED THE FORM AND DOES NOT WANT PT SENT TO REHAB BEFORE SHE IS STABLE.  
CM EXPLAINED THAT ALL DOCTORS WOULD HAVE TO INDICATE PT IS READY TO DISCHARGE 
AND CM IS ONLY WANTING TO BE PROACTIVE ON PT'S BEHALF FOR DISCHARGE PLANNING 
AND SECURE REHAB BED AHEAD OF TIME TO ENSURE THAT PT IS ABLE TO GET INTO [facility] WHEN DISCHARGED AS [facility] IS POPULAR REHAB AND DOES FILL UP.  
PT'S SON WILL WANTS PT TO BE STABLE FOR DISCHARGE FOR CM TO SEND REFERRALS.  
CHOICE SIGNED. CM PROVIDED PT'S SON WITH CM CONTACT INFORMATION.  
  
CM TO SEND REFERRAL TO [facility] WHEN PROJECTED 
DISCHARGE DATE IS KNOWN, AS PT'S SON DOES NOT WANT REHAB REFERRAL SENT OUT 
PRIOR TO PT'S MEDICAL STABILITY FOR DISCHARGE TO REHAB.  
  
: Albaro Chambers
 
 DCPIA - Discharge Planning Initial Assessment
 
Updated by [id number]: Albaro Chambers on 19   1:21 pm
*  Is the patient Alert and Oriented?
 
Yes
*  How many steps to enter\exit or inside your home? NONE *  PCP DR. MCCALL *  Pharmacy
[facility]
*  Preadmission Environment
Acute Inpatient Rehab
*  Facility Name
[facility] INPATIENT REHAB
*  ADLs
Total Dependent
*  Equipment
None
*  Other Equipment
NO MEDICAL EQUIPMENT PROVIDER PREFERECE
*  List name and contact numbers for known caregivers / representatives who 
currently or will assist patient after discharge:
ELIAS KUNZ [phone]
*  Verbal permission to speak to the caregivers and representatives has been 
obtained from the patient.
Yes
*  Community resources currently utilized
None
*  Please name any agencies selected above.
NONE
*  Additional services required to return to the preadmission environment?
Yes
*  Can the patient safely return to the preadmission environment?
Yes
*  Has this patient been hospitalized within the prior 30 days at any 
hospital?
Yes
 
 
 
 
 
Coverage Notice
 
Reviewer: WILLIAN Chambers
 
Notice Issued Date-Time: 2019  13:00
Notice Type: Patient Choice Letter
 
Notice Delivered To: Family Member
Relationship to Patient: Other Relationship
Representative Name: JULIANNE BERMUDEZ
 
Delivery Method: HAND - Hand Delivered
Ariadne Days:
Prior Verbal Notification: 
 
Recipient Understood Notice: Yes
Recipient Signature: Yes
 
Med Rec Note Co-signed by Attending:
 
[facility]  [facility]
Reviewer: WILLIAN Chambers
 
Notice Issued Date-Time: 2019  12:20
Notice Type: IM Discharge Notice
 
Notice Delivered To: Family Member
Relationship to Patient: Other Relationship
Representative Name: JULIANNE BERMUDEZ
 
Delivery Method: HAND - Hand Delivered
Ariadne Days:
Prior Verbal Notification: 
 
Recipient Understood Notice: Yes
Recipient Signature: Yes
Med Rec Note Co-signed by Attending:
 
Coverage Notice Comment:  
Reviewer: WILLIAN Chambers
 
Notice Issued Date-Time: 2019  16:20
Notice Type: IM Discharge Notice
 
Notice Delivered To: Family Member
Relationship to Patient: Other Relationship
Representative Name: AMARI BERMUDEZ
 
Delivery Method: HAND - Hand Delivered
Ariadne Days:
Prior Verbal Notification: 
 
Recipient Understood Notice: Yes
Recipient Signature: Yes
Med Rec Note Co-signed by Attending:
 
Coverage Notice Comment:  
Reviewer: WILLIAN Chambers
 
Notice Issued Date-Time: 2019  16:20
Notice Type: Patient Choice Letter
 
Notice Delivered To: Family Member
Relationship to Patient: Other Relationship
Representative Name: AMARI BERMUDEZ
 
Delivery Method: HAND - Hand Delivered
Ariadne Days:
Prior Verbal Notification: 
 
 
Recipient Understood Notice: Yes
Recipient Signature: Yes
Med Rec Note Co-signed by Attending:
 
Coverage Notice Comment:  [facility]
 
Last DP export: 1/24/19   1:46 p
Patient Name: CHANTAL TIPTON
Encounter #: [id number]
Page [id number]
 
 
 
 
 
Electronically Signed by CHELE RAMON on 19 at 1455
 
 
 
 
 
 
**All edits/amendments must be made on the electronic document**
 
DICTATION DATE: 19     : KARY  19     
RPT#: [id number]                                DC DATE:        
                                               STATUS: ADM IN  
[facility]
 [place] [address]
***END OF REPORT***

## 2019-01-24 NOTE — [facility]
CASE MANAGEMENT DISCHARGE SUMMARY
 
 
PATIENT: CHANTAL TIPTON                        UNIT: [id number]
ACCOUNT#: [id number]                       ADM DATE: 19
AGE: 88     : [birth year]  SEX: F            ROOM/BED: D.2112    
AUTHOR: YENNY,DOC                             PHYSICIAN:                               
 
REFERRING PHYSICIAN: SHARATH TORRES MD               
DATE OF SERVICE: 19
Discharge Plan
 
 
Patient Name: CHANTAL TIPTON
Facility: [facility]
Encounter #: [id number]
Medical Record #: [id number]
: 1931
Planned Disposition: Home with Home Health
Anticipated Discharge Date: 19
 
Discharge Date: 
Expected LOS: 22
Initial Reviewer: [id number]
Initial Review Date: 2019
Generated: 19   3:46 pm 
Comments
 
DCP- Discharge Planning
 
Updated by [id number]: Albaro Chambers on 19   6:55 am CT
Patient Name:  CHANTAL TIPTON   
Encounter No:  [id number]   
:  1931   
Primary Insurance:  HU[employer] CHOICE PPO MCR ADVANT  
Anticipated DC Date: 2019   
Planned Disposition:  Home with Home Health  
External Planned Provider: [facility]  
  
  
DCP follow-up note: CM REVIEWED CHART, PT DID NOT DISCHARGE HOME YESTERDAY.  
CM FAXED UPDATE TO [facility] [phone].    
  
NOTIFY [facility], [phone], OF DISCHARGE.  FAX DISCHARGE TO [facility] 
AT, [phone].  
  
  
Albaro Chambers, CASE MANAGEMENT
DCP- Discharge Planning
 
 
Updated by [email or web address]: Albaro Chambers on 19  11:07 am CT
Patient Name:  CHANTAL TIPTON   
Encounter No:  [id number]   
:  1931   
Primary Insurance:  [employer] CHOICE PPO MCR ADVANT  
Anticipated DC Date: 2019   
Planned Disposition:  Home with Home Health  
External Planned Provider: [facility]  
  
  
DCP follow-up note: CM SPOKE TO PT IN ROOM THIS MORNING, PT REPORTS SHE IS 
HAVING KYPHOPLASTY TODAY BEFORE GOING HOME.  CM INFORMED PT THAT QUOTE FOR 
TUBE FEEDING MATERIALS RECEIVED, CM PROVIDED TO PT.  PT REPORTS SHE WILL 
NOTIFY GINGER THIS MORNING WHEN SHE GETS HERE.  PT DENIES NEEDS.    
  
WHITNEY SPOKE TO AMARI BERMUDEZ, [phone], IN PERSON AT [place].  GINGER 
REPORTS THAT [facility] HAS MADE ARRAGMENTS FOR DELIVERY OF THE HOSPITAL BED AND 
BEDSIDE COMMODE TODAY.  SHE HAS NOT HEARD FROM [facility] ABOUT TUBE FEEDING; 
SHE DID RECEIVE THE QUOTE FOR PRIVATE PAY AND WANTS TO MAKE THE ARRAGNEMENTS. 
 GINGER HAS NOT [facility]ED [facility] HER[facility]LF.  CM CALLED [facility] 
HOME INFUSION, [phone], NOTIFED DAVIN THAT PT'S FAMILY WANTS TO PRIVATE 
PAY THE PUMP AND FEEDING SUPPLIES AND ASKED HER TO CALL GINGER AS SOON AS 
POSSIBLE AND MAKE ARRANGEMENTS FOR DELIVERY TODAY.  MALATHI REPORTS MATERIALS 
AND SUPPLIES ARE CASH ON DELIVERY AND SHE WILL CALL JOHNNIE IMMEDIATELY TO 
MAKE DELIVERY AND EDUCATION ARRANGEMENTS.  
  
NOTIFY [facility], [phone], OF DISCHARGE.  FAX DISCHARGE TO [facility] 
AT, [phone].  
  
  
Albaro Chambers, CASE MANAGEMENT
DCP- Discharge Planning
 
Updated by [id number]: Albaro Chambers on 19   3:06 pm CT
Patient Name:  CHANTAL TIPTON   
Encounter No:  [id number]   
:  1931   
Primary Insurance:  [employer] CHOICE PPO MCR ADVANT  
Anticipated DC Date: 2019   
Planned Disposition:  Home with Home Health  
External Planned Provider: [facility]  
  
  
DCP follow-up note: CM CALLED DWAINE AT [facility], [phone], PROVIDED 
INFORMATION FOR HOSPITAL BED AND BEDSIDE COMMODE, INFORMED OF PLANNED 
DISCHARGE TOMORROW.  CM FAXED ORDER AND REFERRAL INFORMATION TO [facility] AT 
[phone].  [facility] TO ARRANGE DELIVERY OF MEDICAL EQUIPMENT WITH PT'S 
FAMILY.  
  
CM CALLED [facility] INFUSION, [phone], SPOKE TO MARGARET 
REGARDING TUBE FEEDING ORDER; MARGARET ADVISED THAT PT'S INSURANCE WILL NOT PAY 
FOR SUPPLEMENTAL NUTRITION AS PT IS ON REGULAR DIET AND ABLE TO EAT BY MOUTH. 
 
 MARGARET WILL REVIEW REFERRAL TO SEE IF THERE IS SOME WAY THAT INSURANCE MAY 
COVER THE SERVICE.  CM SPOKE TO PT IN ROOM, INFORMED OF ABOVE; PT ASKED FOR 
CM TO GET CASH PAY PRICE FOR TUBE FEEDING AND DISCUSS WITH GINGER, HER NIECE. 
 CM FAXED REFERRAL TO [facility] [phone].  
  
CM CALLED [facility], [phone], SPOKE TO FILI, PROVIDED 
REFERRAL INFORMATION AND ADVISED OF PLANNED DISCHARGE 19.  THEY WILL 
ACCEPT PT FOR ADMIT ON 19.  CM FAXED HOME HEALTH REFERRAL TO [facility] AT 
[phone].  CM SPOKE TO GINGER MILLS IN HALLWAY AS DIRECTED BY PT; 
INFORMED OF ABOVE ARRANGEMENTS.  CM SPOKE TO BEDSIDE NURSE REGARDING FAMILY 
CONCERN OF OXYGEN NEED, BEDSIDE NURSE INFORMED CM THAT SHE WOULD ATTEMPT 
OXYGEN WEANING.  
  
[facility] ARRANGING HOSPITAL BED AND BEDSIDE COMMODE WITH FAMILY.
  PT'S INSURANCE WILL NOT PAY FOR TUBE FEEDING MATERIALS OR SUPPLIES - 
[facility] HOME INFUSION GETTING QUOTE FOR CASH PAY.  [facility] 
[facility] WILL ACCEPT PT AT DISCHARGE.    
  
FOR DISCHARGE, NOTIFY [facility] -[phone], FAX DISCHARGE 
INFORMATION TO [facility] [phone].  CM TO CONTINUE TO FOLLOW AND ASSIST AS 
NEEDED.  
  
  
Albaro Chambers, CASE MANGEMENT  
  
Appended by Albaro Chambers on 2019 16:06 CST:  
CM SPOKE TO PT'S GREAT NIECE IN LAW WHO INFORMED CM THAT [facility] HAS CALLED 
TO ARRANGE DELIVERY OF HOSPITAL BED AND BEDSIDE COMMODE.  SHE HAS NOT HEARD 
FROM [facility] INFUSION REGARDING TUBE FEEDING.  CM CALLED [facility]ION, [phone], SPOKE TO DAVIN WHO REPORTS INSURANCE 
WILL NOT COVER COSTS OF TUBE FEEDING AND PROVIDED QUOTE OVER THE PHONE AND 
WILL FAX QUOTE TO CM FOR PT'S FAMILY.  BEDSIDE NURSE NOTIFIED.  PT'S FAMILY 
ARE NOT IN ROOM, CM LEFT NOTE ON WHITE BOARD FOR FAMILY NOTIFYING THAT TUBE 
FEEDING COSTS ARE NOT COVERED BY INSURANCE WITH QUOTED PRICES.  
  
[facility] ARRANGING HOSPITAL BED AND BEDSIDE COMMODE WITH FAMILY.
  PT'S INSURANCE WILL NOT PAY FOR TUBE FEEDING MATERIALS OR SUPPLIES - 
[facility] IN[facility] QUOTED FOR CASH PAY OF $8 PER DAY FOR 
PUMP AND SUPPLIES / $1.50 PER CAN FOR JEVITY 1.2.  [facility] WILL 
ACCEPT PT AT DISCHARGE.    
  
FOR DISCHARGE, NOTIFY [facility] [phone], FAX DISCHARGE 
INFORMATION TO [facility] [phone].  CM TO CONTINUE TO FOLLOW AND ASSIST AS 
NEEDED.  
  
  
Albaro Chambers, CASE MANGEMENT
DCP- Discharge Planning
 
Updated by [id number]: Albaro Chambers on 19   4:27 pm CT
Patient Name:  CHANTAL TIPTON   
Encounter No:  [id number]   
 
:  1931   
Primary Insurance:  [employer] CHOICE PPO MCR ADVANT  
Anticipated DC Date: 2019   
Planned Disposition:  HOME WITH HOME HEALTH  
External Planned Provider: [facility]  
  
  
DCP follow-up note: CM RECEIVED ORDER FOR HOME HEALTH, TUBE FEEDING AND 
HOSPITAL BED.  CM MET WITH PT AND GRAND NEPHEW'S SPOUSE IN ROOM.  CM BEGAN 
DISCUSSING ORDER, PT'S GRAND NEPHEW'S SPOUSE INFORMED CM THAT PT PLANS TO 
DISCHARGE TO [facility] FOR REHAB AND ASKED IF CM HAS HEARD FROM [facility] AS SHE DID NOT SEE THEM FRIDAY AS THEY WERE SUPPOSED TO EVALUATE PT 
THEN.  CM CALLED [facility], SPOKE TO LATONYA WHO WILL 
CHECK WITH MIRANDA TO FIND OUT WHAT HAS HAPPENED.  LATONYA WILL HAVE MIRANDA CALL 
CM AS SOON AS POSSIBLE.    
CM MET WITH PT, PT'S ELIAS AND PT'S GRAND NEPHEW'S NIECE.  PT'S ELIAS 
INFORMED CM THAT CM DOES NOT NEED TO SEE THEM AND THAT DR. LEPE HAS 
TAKEN CARE OF EVERYTHING. CM ASKED WHAT HAD BEEN WORKED OUT.  CM WAS ADVISED 
THAT PT WILL HAVE THE KYPHOPLASTY ON WEDNESDAY AND THEN GO TO [facility] 
FOR REHAB, DR. LEPE WILL TALK TO MIRANDA AT [facility] AND WORK THIS 
OUT.  CM NOTIFIED LIZZY EMANUEL AND DR. LEPE.    
  
CM RECEIVED CALL BACK FROM MIRANDA WHO SPOKE TO FAMILY, INFORMED THEM THAT PT 
IS NOT APPROPRIATE CANDIDATE FOR REHAB AS SHE IS REFUSING THERAPY SERICES AND 
 INFORMED CM THAT FAMILY HAS REFUSED LONG TERM CARE PLACEMENT AT [facility].    
  
CM MET WITH PT AND PT'S GRAND NEPHEWS SPOUSE IN ROOM.  CM DISCUSSED IMPORTANT 
MESSAGE FROM MEDICARE AND DISCUSSED HOW TO COMPLAIN IF NEEDED TO HOUSE 
SUPERVISOR, ADMINISTRATION AND IF NEEDED, QUALITY IMPROVEMENT OFFICE FOR 
MEDICARE.  GINGER INFORMED CM THAT SHE HAS COMPLAINED TO THE DOCTORS BUT HAS 
BEEN THINKING SHE NEEDS TO COMPLAIN TO SOMEONE ELSE. CM DISCUSSED DISCHARGE 
PLAN.  GINGER HAS TALKED TO PT, PT'S ELIAS AND MIRANDA AT [facility].  THEY 
ARE NOT GOING TO PUT PT INTO LONG TERM CARE AND WILL BE TAKING PT HOME AS 
DISCUSSED LAST WEEK; GINGER STATES PT IS NOT READY FOR HOSPICE BUT WANTS HOME 
HEALTH.  HOME HEALTH LISTING PROVIDED AND DISCUSSED, GINGER WANTS TO USE THE 
HIGHEST RATED COMPANY, PT TOLD GINGER TO SIGN THE FORMS.  GINGER SIGNED THE 
IMPORTANT MESSAGE FROM MEDICARE AND [facility] FOR [facility].  GINGER 
STATES THAT THEY WILL NEED A HOSPITAL BED, BEDSIDE COMMODE, OXYGEN AND TUBE 
FEEDING SET UP FOR PT AT HOME. CM EXPLAINED THAT PT'S INSURANCE HAS TO PRE 
AUTHORIZE ALL MEDICAL EQUIPMENT AND THAT SINCE PT IS ABLE TO EAT, INSURANCE 
MAY NOT COVER TUBE FEEDING.  THEY HAVE NO PREFERENCE ON PROVIDER AND GINGER 
ASKED TO BE CONTACTED REGARDING ANY COPAYS FOR EQUIPMENT [phone].  
  
CM HAS ORDER FOR HOME HEALTH, HOSPITAL BED AND TUBE FEEDING.  CM WILL NEED 
ADDITIONAL ORDERS FOR BEDSIDE COMMODE AND OXYGEN TESTING.    
  
CM TO FIND IN NETWORK PROVIDER FOR PT'S INSURANCE TO ARRANGE NEEDED MEDICAL 
EQUIPMENT AS SOON AS POSSIBLE.  CM TO COMPLETE HOME HEALTH ARRANGEMENTS WITH 
[facility].  
  
Albaro Chambers
 
DCP- Discharge Planning
 
Updated by [id number]: Marli Rancho on 19  11:53 am CT
RECEIVED NOTICE THAT THE PATIENTS FAMILY WAS WANTING HER TO HAVE THE 
KYPHOPLASTY BEFORE SHE LEAVES THE HOSPITAL. IT WAS MENTIONED THAT THE 
PATIENTS POA HAS NOT BEEN HER AND THERE IS NO POA PAPERWORK IN THE CHART. I 
ASKED THE BEDSIDE NURSE PENG ENCARNACION TO COME INTO THE ROOM WITH ME TO DISCUSS 
THE NEED FOR POA PAPERWORK IN ORDER TO HAVE ANY CONSENTS FOR PROCEDURES 
SIGNED SINCE THE PATIENT WAS NOT ABLE TO SIGN HERSELF, AND BY THE HIERACHY OF 
LAW, THE POA OR NEXT OF KIN WOULD BE WHO NEEDED TO SIGN AND SINCE THEY (THE 
LADY AND FIJUVENTINO) WERE NOT BLOOD RELATED, THEY COULD NOT SIGN FOR HER AS THEY 
HAVE BEEN. AT THIS TIME I WAS QUICKLY TOLD THAT SHE WAS IN HER RIGHT MIND AND 
ABLE TO MAKE ALL HER OWN DECISIONS. THE LADY AT BEDSIDE PROCEEDED TO TELL ME 
ABOUT ALL THE TIMES PEOPLE CAME IN AND QUESTIONED HER ABOUT THE DATE, TIME, 
PRESIDENT, ETC AND SHE WAS ABLE TO ALWAYS ANSWER ALL THEIR QUESTIONS. SHE 
STATED THE PATIENT WAS ABLE TO SIGN FOR HERSELF. UP TO THIS POINT, THE 
PATIENT HAD NOT OPENED HER EYES AND HAD NOT SAID ANYTHING. IT TOOK ME 
TOUCHING THE PATIENT'S ARM TO GET HER TO OPEN HER EYES AND ACKNOWLEDGE MY 
PRESENCE AND ANSWER MY QUESTIONS. THE PATIENT IS WITH IT AND ABLE TO ANSWER 
QUESTIONS. AT THIS POINT I ASKED HER IF SHE WANTED TO HAVE THE KYPHOPLASTY 
DONE. SHE STATED YES. THE LADY AT BEDSIDE BECAME VERY AGITATED STATING THAT 
SHE KNEW ABOUT HIPPA LAWS AND WANTED TO KNOW WHY ANYONE EVEN ALLOWED THINGS 
TO HAPPEN UP UNTIL NOW. NO ONE HAD ASKED FOR ANYONE'S DRIVERS LICENES TO 
VERIFY THEY WERE WHO THEY SAID THEY WERE. I TRIED TO EXPLAINE THAT EVEN WITH 
THAT, WE HAD NO WAY TO ACTUALLY VERIFY THAT WITH A DRIVERS LICENSE BECAUSE 
ANYONE CAN SAY THEY ARE SOMEONES FAMILY AND WE DON'T KNOW OTHERWISE UNLESS 
THE PATIENT SAYS SO, BUT SHE CUT ME OFF AS I WAS TALKING. I STOOD AT BEDSIDE 
AND LISTENED TO HER RANT ABOUT ALL THE THINGS THAT SHE HAS WITNESSED HERE AT 
THIS HOSPITAL THAT HAS MADE HER SICK TO HER STOMACH ESPECIALLY ABOUT THE FACT 
THAT SHE CAME IN HERE WITH A HURT BACK AND NOW THEY ARE JUST WATCHING HER DIE.
 I APOLOGIZED TO HER ABOUT ALL OF THE THINGS THAT MARIA ALEJANDRA WAS UPSET ABOUT, AND 
THEN DIRECTED MY CONVERSATION TO THE PATIENT, EXPLAINING THAT IF SHE WANTED 
THEY KYPHOPLASTY AND HER URINE WAS CLEAR THAT I WOULD EXPLAIN THIS TO THE 
NURSE PRACTITIONER. THAT ULTIMATELY I AM HERE TO TAKE CARE OF HER AND MAKE 
SURE WE ARE DOING WHAT SHE WANTS. I ALSO EXPLAINED TO HER THAT SHE NEEDS TO 
SIGN ALL OF HER OWN PAPERWORK, EVEN IF IT IS JUST WITH AN "X". THAT WE SIGN 
AS WITNESSES TO STATE WE KNOW SHE UNDERSTANDS AND SHE IS SIGNING. EXPLAINED 
THAT AS LONG AS SHE IS ABLE TO MAKE ALL HER OWN DECISIONS, WE DID NOT NEED 
HER POA PAPERWORK. SHE IS IN AGREEMENT. THE GREAT NEPHEWS WIFE WHO IS AT 
BEDSIDE CONTINUED WITH HER RANT, I AGAIN APOLOGIZED AND THE BEDSIDE NURSE AND 
I LEFT THE ROOM. I EXPLAINED TO SCOTTIE THAT THE PATIENT WAS CURRENTLY IN HER 
RIGHT MIND AND SHE IS WANTING THEY KYPHOPLASTY, AND SHE RECONSULTED IR TO SEE 
THE PATIENT.
DCP- Discharge Planning
 
Updated by [id number]: Melany Olivares on 19   4:25 pm CT
CM RECEIVED AN ORDER REGARDING PLANS TO DISCHARGE TO HOME W/ HOSPITAL BED , 
TUBE FEEDING AND HOME HEALTH. PATIENT'S NUTRITION NOTE IS 19. WILL NEED 
NUTRITION NOTE REGARDING PATIENT NEEDS FOR DISCHARGE. CM UNDERSTANDS THE 
PATIENT DOES EAT AND TAKE HER MEDICATIONS BY MOUTH. SHE IS PRESENTLY ON 
FEEDINGS VIA  PUMP. SHE HAS BEEN ADVANCED TO 60 CC/HR TODAY WHICH IS HER GOAL 
RATE.  
 
SPOKE WITH LIYAH KLINE AND SHE REPORTS NO RESIDUAL AT THIS TIME.  
WILL NEED TO HAVE [facility] PROVIDE BED AND SPECIALTY MATTRESS  FOR HOME 
AND   
DETERMINE COVERAGE FOR TUBE FEEDING AND EQUIPMENT.  
CM TO FOLLOW UP IN AM.
DCP- Discharge Planning
 
Updated by [id number]: Albaro Chambers on 19   7:14 am CT
Patient Name:  CHANTAL TIPTON   
Encounter No:  [id number]   
:  1931   
Primary Insurance:  HU[employer]NA CHOICE PPO MCR ADVANT  
Anticipated DC Date: 2019   
Planned Disposition:  Skilled Nursing Facility  
External Planned Provider: [facility], MEDICARE REHAB BED 
  
  
DCP follow-up note: CM FAXED UPDATE TO [facility], 
[phone] FOR REHAB REQUEST.  
  
CM WAITING ADMISSION DETERMINATION FROM [facility] FOR 
REHAB PLACEMENT.  
  
ALBARO CHAMBERS CASE MANAGEMENT
DCP- Discharge Planning
 
Updated by [id number]: Albaro Chambers on 19   3:46 pm CT
Patient Name:  CHANTAL TIPTON   
Encounter No:  [id number]   
:  1931   
Primary Insurance:  HU[employer]NA CHOICE PPO MCR ADVANT  
Anticipated DC Date: 2019   
Planned Disposition:  Skilled Nursing Facility  
External Planned Provider:  [facility], MEDICARE REHAB 
BED  
  
  
DCP follow-up note: CM MET WITH PT'S TIANNA MCKEON BY MARRIAGE, AMARI BERMUDEZ, 
[phone].  GINGER STATES THAT JULIANNE BERMUDEZ IS PT'S FIANCE.  PT HAS POWER 
OF  WITH GREAT NEPHEW, GINGERS SPOUSE, MAGED BERMUDEZ, WHO WORKS IN 
[employer].  GINGER WILL GET MAGED TO HOSPITAL ALONG WITH POWER OF  
PAPERWORK AS SOON AS POSSIBLE.  THEY ARE IN AGREEMENT WITH REHAB AT [facility], BUT [facility] MAY NOT ACCEPT AS PT HAS CONTINUED DECLINE.  [facility] TO COME TOMORROW TO EVALUATE PT FOR REHAB.  IF [facility] DOES 
NOT ACCEPT FOR REHAB, THEY ARE THINKING TO TAKE PT HOME TO [facility] HERE 
IN [place] AND [facility] ALONG WITH FAMILY WILL CARE FOR PT WITH HOME 
HOSPICE.  GINGER REPORTS THEY WILL MAKE THAT DECISION TOMORROW.  CM EXPLAINED 
THAT THE POWER OF  WILL HAVE TO BE IN AGREEMENT AND SIGN ANY NEEDED 
AUTHORIZATIONS FOR ENROLLMENT IN REHAB OR HOSPICE CARE.  GINGER REPORTS 
UNDERSTANDING, PROVIDED CELL PHONE NUMBER FOR MAGED BERMUDEZ, [phone].  
  
CM WAITING ADMISSION DETERMINATION FROM [facility] FOR 
 
REHAB PLACEMENT.  
  
NEERU VILLATORO MANAGEMENT
 
DCP- Discharge Planning
 
Updated by [id number]: Albaro Chambers on 19   3:34 pm CT
Patient Name:  CHANTAL TIPTON   
Encounter No:  [id number]   
:  1931   
Primary Insurance:  HU[employer] CHOICE PPO MCR ADVANT  
Anticipated DC Date: 2019   
Planned Disposition:  Skilled Nursing Facility  
External Planned Provider: [facility], MEDICARE REHAB BED 
  
  
DCP follow-up note: CM MET WITH PT AND SON/POA, JULIANNE BERMUDEZ, AT FAMILY 
REQUEST.  MR. BERMUDEZ REPORTS IT IS NOW TIME TO SEND REFERRAL TO [facility] 
FOR REHAB PLACEMENT.  IMPORTANT MESSAGE FROM MEDICARE PROVIDED AND EXPLAINED. 
  
[phone].   
  
CM WAITING ADMISSION DETERMINATION FROM [facility] FOR 
REHAB PLACEMENT.  
  
ALBARO CHAMBERS, CASE MANAGEMENT
DCP- Discharge Planning
 
Updated by [id number]: Albaro Chambers on 19   4:52 pm CT
Patient Name: CHANTAL TIPTON                                     
Admission Status: Elective   
Accout number: [id number]                              
Admission Date: 2019   
: 1931                                                        
Admission Diagnosis:ACUTE KIDNEY FAILURE, UNSPECIFIED   
Attending: SHARATH TORRES                                                
Current LOS:  2   
  
Anticipated DC Date:    
Planned Disposition: Skilled Nursing Facility   
Primary Insurance: HU[employer]NA CHOICE PPO MCR ADVANT   
PLANNED EXTERNAL PROVIDER:  [facility], MEDICARE REHAB 
BED  
  
Discharge Planning Comments:   
CM RECEIVED ORDER INDICATING PT WANTS NURSING HOME CARE.  CM MET WITH PT AND 
SON IN ROOM TO DISCUSS DISCHARGE PLANNING AND NEEDS. PT REPORTS THAT BEFORE 
GETTING SICK, SHE WAS LIVING AT HOME WITH HER SON, INDEPENDENT IN HER CARE.  
PT REPORTS SHE DID TELL THE DOCTOR THAT SHE WOULD BE BETTER OFF IN A NURSING 
HOME BEFORE FAMILY ARRIVED TODAY.  PT WANTS REHAB, NOT LONG TERM CARE.   PT 
HAS NO MEDICAL EQUIPMENT AND NO OUTSIDE SERVICES ASSISTING IN THE HOME. CM 
DISCUSSED AVAILABILITY OF HOME HEALTH, REHAB SERVICES AND MEDICAL EQUIPMENT. 
 
PT'S SON INITIALLY DECLINED TO PARTICIPATE IN DISCHARGE PLANNING REPORTING PT 
WAS SENT TO REHAB DOWNSTAIRS TOO SOON WHEN SHE WAS NOT ABLE TO WALK OR EVEN 
FEED HERSELF. SON WANTS PT CONSIDERED FOR INPATIENT REHAB AGAIN STATING THEY 
SENT HER BACK HERE BECAUSE SHE USED ALL OF HER DAYS DOWN THERE.  PT DOES NOT 
THINK SHE CAN PARTICIPATE IN THREE HOURS OF PROGRESSIVE THERAPY PER DAY.  CM 
EXPLAINED THAT IF PT IS NOT ABLE TO PARTICIPATE IN THE THREE HOURS OF 
PROGRESSIVE THERAPY, IS NOT ABLE TO STAND OR FEED HERSELF, SHE IS NOT GOING 
TO BE APPROPRIATE FOR READMISSION TO INPATENT REHAB.  CM DISCUSSED 
AVAILABILITY OF SKILLED NURSING REHAB SERVICES AND PROVIDED CHOICE FORM.  
PT'S SON REPORTS HE ALREADY SIGNED ONE OF THESE FOR [facility] THE LAST 
VISIT AND SPEAKING WITH THIS CM.  CM EXPLAINED A NEW ONE WAS NEEDED.  PT'S 
SON SIGNED THE FORM AND DOES NOT WANT PT SENT TO REHAB BEFORE SHE IS STABLE.  
CM EXPLAINED THAT ALL DOCTORS WOULD HAVE TO INDICATE PT IS READY TO DISCHARGE 
AND CM IS ONLY WANTING TO BE PROACTIVE ON PT'S BEHALF FOR DISCHARGE PLANNING 
AND SECURE REHAB BED AHEAD OF TIME TO ENSURE THAT PT IS ABLE TO GET INTO [facility] WHEN DISCHARGED AS [facility] IS POPULAR REHAB AND DOES FILL UP.  
PT'S SON WILL WANTS PT TO BE STABLE FOR DISCHARGE FOR CM TO SEND REFERRALS.  
CHOICE SIGNED. CM PROVIDED PT'S SON WITH CM CONTACT INFORMATION.  
  
CM TO SEND REFERRAL TO [facility] WHEN PROJECTED 
DISCHARGE DATE IS KNOWN, AS PT'S SON DOES NOT WANT REHAB REFERRAL SENT OUT 
PRIOR TO PT'S MEDICAL STABILITY FOR DISCHARGE TO REHAB.  
  
: Albaro Chambers
 
 DCPIA - Discharge Planning Initial Assessment
 
Updated by [id number]: Albaro Chambers on 19   1:21 pm
*  Is the patient Alert and Oriented?
Yes
*  How many steps to enter\exit or inside your home? NONE *  PCP DR. MCCALL *  Pharmacy
[facility]
*  Preadmission Environment
Acute Inpatient Rehab
*  Facility Name
[facility]
*  ADLs
Total Dependent
*  Equipment
None
*  Other Equipment
NO MEDICAL EQUIPMENT PROVIDER PREFERECE
*  List name and contact numbers for known caregivers / representatives who 
currently or will assist patient after discharge:
ELIAS KUNZ [phone]
*  Verbal permission to speak to the caregivers and representatives has been 
obtained from the patient.
Yes
*  Community resources currently utilized
None
*  Please name any agencies selected above.
NONE
 
*  Additional services required to return to the preadmission environment?
Yes
*  Can the patient safely return to the preadmission environment?
Yes
*  Has this patient been hospitalized within the prior 30 days at any 
hospital?
Yes
 
 
 
 
 
Coverage Notice
 
Reviewer: [id number] - Albaro Chambers
 
Notice Issued Date-Time: 2019  13:00
Notice Type: Patient Choice Letter
 
Notice Delivered To: Family Member
Relationship to Patient: Other Relationship
Representative Name: JULIANNE BERMUDEZ
 
Delivery Method: HAND - Hand Delivered
Ariadne Days:
Prior Verbal Notification: 
 
Recipient Understood Notice: Yes
Recipient Signature: Yes
Med Rec Note Co-signed by Attending:
 
Coverage Notice Comment:  [facility]
Reviewer: WILLIAN Chambers
 
Notice Issued Date-Time: 2019  12:20
Notice Type: IM Discharge Notice
 
Notice Delivered To: Family Member
Relationship to Patient: Other Relationship
Representative Name: JULIANNE BERMUDEZ
 
Delivery Method: HAND - Hand Delivered
Ariadne Days:
Prior Verbal Notification: 
 
Recipient Understood Notice: Yes
Recipient Signature: Yes
Med Rec Note Co-signed by Attending:
 
Coverage Notice Comment:  
Reviewer: WILLIAN Chambers
 
 
Notice Issued Date-Time: 2019  16:20
Notice Type: IM Discharge Notice
 
Notice Delivered To: Family Member
Relationship to Patient: Other Relationship
Representative Name: AMARI BERMUDEZ
 
Delivery Method: HAND - Hand Delivered
Ariadne Days:
Prior Verbal Notification: 
 
Recipient Understood Notice: Yes
Recipient Signature: Yes
Med Rec Note Co-signed by Attending:
 
Coverage Notice Comment:  
Reviewer: WILLIAN Chambers
 
Notice Issued Date-Time: 2019  16:20
Notice Type: Patient Choice Letter
 
Notice Delivered To: Family Member
Relationship to Patient: Other Relationship
Representative Name: AMARI BERMUDEZ
 
Delivery Method: HAND - Hand Delivered
Ariadne Days:
Prior Verbal Notification: 
 
Recipient Understood Notice: Yes
Recipient Signature: Yes
Med Rec Note Co-signed by Attending:
 
Coverage Notice Comment:  [facility] DP export: 19   6:56 a
Patient Name: CHANTAL TIPTON
 
Encounter #: [id number]
Page [id number]
 
 
 
 
 
Electronically Signed by CHELE RAMON on 19 at 1446
 
 
 
 
 
 
**All edits/amendments must be made on the electronic document**
 
DICTATION DATE: 19 1445     : KARY  19 1445     
RPT#: [id number]                                DC DATE:        
                                               STATUS: ADM IN  
[facility]
[address] [place] [address]
***END OF REPORT***

## 2019-01-25 ENCOUNTER — HOSPITAL ENCOUNTER (INPATIENT)
Dept: HOSPITAL 84 - D.MS | Age: 84
LOS: 4 days | Discharge: HOSPICE | DRG: 193 | End: 2019-01-29
Attending: FAMILY MEDICINE | Admitting: FAMILY MEDICINE
Payer: COMMERCIAL

## 2019-01-25 VITALS — WEIGHT: 143.3 LBS | BODY MASS INDEX: 23.88 KG/M2 | HEIGHT: 65 IN | BODY MASS INDEX: 23.88 KG/M2

## 2019-01-25 VITALS — DIASTOLIC BLOOD PRESSURE: 56 MMHG | SYSTOLIC BLOOD PRESSURE: 103 MMHG

## 2019-01-25 VITALS — SYSTOLIC BLOOD PRESSURE: 134 MMHG | DIASTOLIC BLOOD PRESSURE: 65 MMHG

## 2019-01-25 DIAGNOSIS — R06.03: ICD-10-CM

## 2019-01-25 DIAGNOSIS — I48.91: ICD-10-CM

## 2019-01-25 DIAGNOSIS — E11.9: ICD-10-CM

## 2019-01-25 DIAGNOSIS — R53.2: ICD-10-CM

## 2019-01-25 DIAGNOSIS — J18.9: Primary | ICD-10-CM

## 2019-01-25 LAB
ALBUMIN SERPL-MCNC: 2.2 G/DL (ref 3.4–5)
ALP SERPL-CCNC: 143 U/L (ref 46–116)
ALT SERPL-CCNC: 21 U/L (ref 10–68)
ANION GAP SERPL CALC-SCNC: 13.2 MMOL/L (ref 8–16)
APPEARANCE UR: CLEAR
BASOPHILS NFR BLD AUTO: 0.7 % (ref 0–2)
BILIRUB SERPL-MCNC: 0.54 MG/DL (ref 0.2–1.3)
BILIRUB SERPL-MCNC: NEGATIVE MG/DL
BUN SERPL-MCNC: 26 MG/DL (ref 7–18)
CALCIUM SERPL-MCNC: 8.1 MG/DL (ref 8.5–10.1)
CHLORIDE SERPL-SCNC: 100 MMOL/L (ref 98–107)
CO2 SERPL-SCNC: 30.5 MMOL/L (ref 21–32)
COLOR UR: YELLOW
CREAT SERPL-MCNC: 1.5 MG/DL (ref 0.6–1.3)
EOSINOPHIL NFR BLD: 2.8 % (ref 0–7)
ERYTHROCYTE [DISTWIDTH] IN BLOOD BY AUTOMATED COUNT: 16.8 % (ref 11.5–14.5)
GLOBULIN SER-MCNC: 3.3 G/L
GLUCOSE SERPL-MCNC: 94 MG/DL (ref 74–106)
GLUCOSE SERPL-MCNC: NEGATIVE MG/DL
HCT VFR BLD CALC: 31.1 % (ref 36–48)
HGB BLD-MCNC: 9.8 G/DL (ref 12–16)
IMM GRANULOCYTES NFR BLD: 0.7 % (ref 0–5)
KETONES UR STRIP-MCNC: NEGATIVE MG/DL
LIPASE SERPL-CCNC: 547 U/L (ref 73–393)
LYMPHOCYTES NFR BLD AUTO: 11.5 % (ref 15–50)
MCH RBC QN AUTO: 31.1 PG (ref 26–34)
MCHC RBC AUTO-ENTMCNC: 31.5 G/DL (ref 31–37)
MCV RBC: 98.7 FL (ref 80–100)
MONOCYTES NFR BLD: 10.7 % (ref 2–11)
NEUTROPHILS NFR BLD AUTO: 73.6 % (ref 40–80)
NITRITE UR-MCNC: NEGATIVE MG/ML
OSMOLALITY SERPL CALC.SUM OF ELEC: 282 MOSM/KG (ref 275–300)
PH UR STRIP: 7.5 [PH] (ref 5–6)
PLATELET # BLD: 424 10X3/UL (ref 130–400)
PMV BLD AUTO: 9.9 FL (ref 7.4–10.4)
POTASSIUM SERPL-SCNC: 4.7 MMOL/L (ref 3.5–5.1)
PROT SERPL-MCNC: 5.5 G/DL (ref 6.4–8.2)
PROT UR-MCNC: NEGATIVE MG/DL
RBC # BLD AUTO: 3.15 10X6/UL (ref 4–5.4)
SODIUM SERPL-SCNC: 139 MMOL/L (ref 136–145)
SP GR UR STRIP: 1.01 (ref 1–1.02)
TROPONIN I SERPL-MCNC: 0.02 NG/ML (ref 0–0.06)
UROBILINOGEN UR-MCNC: NORMAL MG/DL
WBC # BLD AUTO: 8.9 10X3/UL (ref 4.8–10.8)

## 2019-01-25 NOTE — MORECARE
CASE MANAGEMENT DISCHARGE SUMMARY
 
 
PATIENT: CHANTAL TIPTON                        UNIT: [id number]
ACCOUNT#: [id number]                       ADM DATE: 19
AGE: 88     : [birth year]  SEX: F            ROOM/BED: D.2216    
AUTHOR: CHELE RAMON                             PHYSICIAN:                               
 
REFERRING PHYSICIAN: ALEM LEPE MD          
DATE OF SERVICE: 19
Discharge Plan
 
 
Patient Name: CHANTAL TIPTON
Facility: [facility]:[place]
Encounter #: [id number]
Medical Record #: [id number]
: 1931
Planned Disposition: 
Anticipated Discharge Date: 
 
Discharge Date: 
Expected LOS: 
Initial Reviewer: [id number]
Initial Review Date: 2019
Generated: 19   5:46 pm 
Comments
 
DCP- Discharge Planning
 
Updated by [email or web address]: Emilie Olvera on 19   3:46 pm CT
SPOKE WITH MAGED (POA, NEPHEW) &  AMARI (NIECE) ABOUT HOSPICE IN [facility]. 
THEY WOULD LIKE HER TO DO IN PATIENT HOSPICE IF POSSIBLE EXPLAINED TO THEM 
THEIR 3 CHOICES (IF SHE WOULD QUALIFY, FOR GIP) THEIR FIRST CHOICE IS [facility] 
HERE AT [facility], THEN IF THEY SAY SHE IS NOT GIP THEY WOULD LIKE HOSPICE OF 
[facility] AND [facility] TO COME AND EVALUATE HER FOR GIP. IF 
ALL THREE SAY NO, THEN THEY WOULD LIKE TO DO HOSPICE AT [place]. I HAVE 
CONTACTED GABY WITH [facility]. I HAVE SPOKEN WITH KARISSA KUHN ABOUT THE ABOVE 
CM WILL CONTIUE TO FOLLOW AND ASSIST WITH DC PLANNING  
  
MAGED BERMUDEZ (PAUL) [phone]  
AMARI BERMUDEZ [phone]
  
 
 
External Providers
External Provider: [facility]
 
Next Contact Date: 
Service Request Date: 
 
Service Type: 
Resolution: 
 
Reviewer: 
Comments: 
 
 
 
 
 
Patient Name: CHANTAL TIPTON
Encounter #: [id number]
Page [phone]
 
 
 
 
 
Electronically Signed by CHELE RAMON on 19 at 1647
 
 
 
 
 
 
**All edits/amendments must be made on the electronic document**
 
DICTATION DATE: 19     : KARY  19     
RPT#: [id number]                                DC DATE:        
                                               STATUS: ADM IN  
[facility]
[address] [place] [address]
***END OF REPORT***

## 2019-01-25 NOTE — NUR
PT CHECKED FOR INCONT. DRY AT THE MOMENT. TURNED AND POSITIONED ON RIGHT SIDE
FOR COMFORT. FAMILY VISITING WITH HOSPICE IN [place]. FALL PRECAUTIONS IN
PLACE

## 2019-01-25 NOTE — NUR
PT LYING IN BED ANSWERS APPROPRIATELY, NO FAMILY AT BEDSIDE, NO ORDERS FOR
DIET PLACED, WILL SEE WHAT DIET PT NEEDS TO BE ON, NO NEEDS VOICED, CONTINUE
WITH PLAN OF CARE

## 2019-01-25 NOTE — NUR
PT CLEANED OF INCONT STOOL AND THEN I/O CATH DONE FOR URINE AS ORDERED. PEG
SITE CLEAN AND DRY WITH NO REDDNESS OR DRAINAGE NOTED. FLUSHES EASILY FOR
MEDS. DRESSINGS REMOVED FROM BILAT HEELS. NO SKIN BREAKDOWN NOTED ON LEFT AND
QUARTER SIZE STAGE 2 NOTED TO RIGHT HEEL-MEDIPLEX DRESSING APPLIED AND DATED.
SPOUSE AT BEDSIDE. FALL PRECAUTIONS IN PLACE

## 2019-01-26 VITALS — SYSTOLIC BLOOD PRESSURE: 190 MMHG | DIASTOLIC BLOOD PRESSURE: 69 MMHG

## 2019-01-26 VITALS — DIASTOLIC BLOOD PRESSURE: 84 MMHG | SYSTOLIC BLOOD PRESSURE: 155 MMHG

## 2019-01-26 VITALS — DIASTOLIC BLOOD PRESSURE: 78 MMHG | SYSTOLIC BLOOD PRESSURE: 179 MMHG

## 2019-01-26 NOTE — NUR
REPORT RECEIVED AND CARE OF PT ASSUMED. PT LYING IN LOW BRIGGS'S POSITION WITH
EYES CLOSED. LEFT MIDLINE PATENT WITH NS INFUSING AT KVO. ALEXANDER CATHETER
DRAINING TO GRAVITY WITH YELLOW URINE IN COLLECTION BAG. WILL MONITOR FOR
NEEDS.  SIDE RAILS UP X2 FOR SAFETY.

## 2019-01-26 NOTE — NUR
ASSISTED CNA'S WITH BED CHANGE FOR PT, PT STATED SHE NEEDED TO HAVE A BM BUT
TOO WEAK TO PUSH IT OUT, AFTER TURNING PT ON SIDE PT STARTED TO BLEED ADVISED
HOSPICE NURSE DEJON MALDONADO RN

## 2019-01-26 NOTE — NUR
RECEIVED CALL FROM [facility] SUPERVISOR GABY, ASKED IF PT FAMILY HAS
ASKED ABOUT FEEDINGS, ADVISED HIM PT  AND FAMILY HAS NOT ASKED ME ABOUT
FEEDINGS AND PT DID NOT TELL ME SHE WAS HUNGRY, HOSPICE NURSE IN [place] BUT
DID NOT KNOW ABOUT REQUEST EITHER, WILL CONTINUE WITH PLAN OF CARE

## 2019-01-27 VITALS — SYSTOLIC BLOOD PRESSURE: 141 MMHG | DIASTOLIC BLOOD PRESSURE: 75 MMHG

## 2019-01-27 VITALS — SYSTOLIC BLOOD PRESSURE: 127 MMHG | DIASTOLIC BLOOD PRESSURE: 73 MMHG

## 2019-01-27 VITALS — DIASTOLIC BLOOD PRESSURE: 73 MMHG | SYSTOLIC BLOOD PRESSURE: 172 MMHG

## 2019-01-27 VITALS — SYSTOLIC BLOOD PRESSURE: 146 MMHG | DIASTOLIC BLOOD PRESSURE: 93 MMHG

## 2019-01-27 NOTE — NUR
REPORT RECIEVED. PATIENT IN BED RESTING QUIETLY WITH NO COMPLAINTS OR SIGNS OF
DISTRESS. IV INTACT. CALL LIGHT WITHIN REACH.

## 2019-01-27 NOTE — NUR
PATIENT IN BED WITH IV INTACT. NO COMPLAINTS OR SIGNS OF DISTRESS. FAMILY AT
BEDSIDE. RECIEVED MORPHINE FOR PAIN BY BALDO LOERA. STATED SHE HAD BACK PAIN.
PATIENT WANTING TO DRINK WATER. CALL HOSPICE NURSE AT THIS TIME. WAITING FOR
CALL BACK. CALL LIGHT WITHIN REACH.

## 2019-01-27 NOTE — NUR
PATIENT IN BED WITH EYES CLOSED RESTING QUIETLY. NO COMPLAINTS OR SIGNS OF
DISTRESS. CALL LIGHT WITHIN REACH.

## 2019-01-27 NOTE — NUR
HOSPICE NURSE HERE VISITING WITH FAMILY MEMBERS. NEW ORDERS TO RE-START TUBE
FEEDING IN AM: JEVITY 1.2 @ 60 ML/HR; AND START REGULAR / SOFT DIET.

## 2019-01-27 NOTE — NUR
REPORT RECEIVED AND CARE OF PT ASSUMED. PT LYING IN HIGH BRIGGS'S POSITION
VISITING WITH FAMILY MEMBERS. LEFT MIDLINE PATENT WITH NS INFUSING AT KVO.
FAMILY ASKING ABOUT PT BEGINNING PO INTAKE.

## 2019-01-27 NOTE — NUR
ASSESSMENT COMPLETE, VS STABLE. PATIENT IN BED WITH IV INTACT. EYES OPEN,
DENIES PAIN. REFUSES TO TURN AT THIS TIME. CALL LIGHT WITHIN REACH.

## 2019-01-27 NOTE — NUR
PATIENT IN BED WITH EYES OPEN. NO COMPLAINTS OR SIGNS OF DISTRESS. STATES SHE
WANTS HER CLOTHES. EXPLAINED TO PATIENT THAT SHE DOESNT HAVE ANY RIGHT NOW AND
THAT IF SHE WAS COLD I COULD GIVE HER A BLANKET. VEBALIZED UNDERSTANDING. CALL
LIGHT WITHIN REACH.

## 2019-01-28 VITALS — SYSTOLIC BLOOD PRESSURE: 156 MMHG | DIASTOLIC BLOOD PRESSURE: 62 MMHG

## 2019-01-28 VITALS — DIASTOLIC BLOOD PRESSURE: 77 MMHG | SYSTOLIC BLOOD PRESSURE: 173 MMHG

## 2019-01-28 VITALS — DIASTOLIC BLOOD PRESSURE: 81 MMHG | SYSTOLIC BLOOD PRESSURE: 110 MMHG

## 2019-01-28 NOTE — NUR
NUTRITION MONITORING AND EVAL
SPOKE WITH NURSING, TUBE FEEDS JEVITY 1.2 TO RESUME WITH GOAL RATE 60 CC/HR.
RECOMMEND GOAL RATE BE 40 CC/HR WITH 20 CC H2O FLUSH Q HOUR.
RD FOLLOWING

## 2019-01-28 NOTE — NUR
PT IN BED EYES OPEN ALERTS TO VERBAL STIMULI. PT CONFUSED PLACE TIME
SITUATION. NO SIGNS OF DISTRESS. BREATHING EVEN AND UNLABORED.  PT STATES NO
PROBLEMS AT THIS TIME. IV SITE LT UPPER ARM MIDLINE. DRESSING CLEAN DRY AND
INTACT. NO SIGNS OF INFECTION. 2LO2 NASAL CANNULA. BOWEL SOUNDS ACTIVE. PEG
TUBE LT UPPER ABD. JEVITY UP TO 30 ML PER HOUR WITH GOAL OF 40. ALEXANDER IN PLACE
NO SIGNS OF INFECTION CLEAN DRY AND INTACT WITH YELLOW CLEAR URINE DRAINING.
WILL CONTINUE PLAN OF CARE. CALL LIGHT IN REACH. BED ALARM ON. BED LOWERED AND
LOCKED BED RAILS X3.

## 2019-01-28 NOTE — NUR
GAE MORPHINE IVP PER PT REQUEST FOR BACK PAIN. RE-POSITIONED PT FOR COMFORT
ONTO LEFT SIDE PROPPED WITH PILLOWS. SIDE RAILS UP X2 FOR SAFETY.

## 2019-01-29 VITALS — SYSTOLIC BLOOD PRESSURE: 178 MMHG | DIASTOLIC BLOOD PRESSURE: 70 MMHG

## 2019-01-29 VITALS — SYSTOLIC BLOOD PRESSURE: 142 MMHG | DIASTOLIC BLOOD PRESSURE: 53 MMHG

## 2019-01-29 NOTE — MORECARE
CASE MANAGEMENT DISCHARGE SUMMARY
 
 
PATIENT: CHANTAL TIPTON                        UNIT: [id number]
ACCOUNT#: [id number]                       ADM DATE: 19
AGE: 88     : 31  SEX: F            ROOM/BED: D.2216    
AUTHOR: CHELE RAMON                             PHYSICIAN:                               
 
REFERRING PHYSICIAN: YOAN OLGUIN MD            
DATE OF SERVICE: 19
Discharge Plan
 
 
Patient Name: CHANTAL TIPTON
[facility]
Encounter #: [id number]
Medical Record #: [id number]
: 1931
Planned Disposition: 
Anticipated Discharge Date: 
 
Discharge Date: 
Expected LOS: 
Initial Reviewer: [email or web address]
Initial Review Date: 2019
Generated: 19   5:18 pm 
Comments
 
DCP- Discharge Planning
 
Updated by [id number]: Emilie Olvera on 19   3:18 pm CT
SPOKE WITH LUCAS AT [place], THEY ARE ACCEPTING PATIENT TODAY AND HAVE 
EVERYTHING THEY WILL NEED. PATIENT WILL TRANSFER THERE VIA EMS
DCP- Discharge Planning
 
Updated by [email or web address]: Emilie Olvera on 19  12:47 pm CT
Patient no longer meets GIP and will be discharging to [place] [facility].   
CM will follow as needed
  
 
 
 
 
 
 
 
 
Last DP export: 19  12:49 p
Patient Name: CHANTAL TIPTON
 
Encounter #: [id number]
Page [phone]
 
 
 
 
 
Electronically Signed by CHELE RAMON on 19 at 1618
 
 
 
 
 
 
**All edits/amendments must be made on the electronic document**
 
DICTATION DATE: 19     : KARY  19     
RPT#: [id number]                                DC DATE:        
                                               STATUS: ADM IN  
[facility]
 [place] [address]
***END OF REPORT***

## 2019-01-29 NOTE — MORECARE
CASE MANAGEMENT DISCHARGE SUMMARY
 
 
PATIENT: CHANTAL TIPTON                        UNIT: [id number]
ACCOUNT#: [id number]                       ADM DATE: 19
AGE: 88     : 31  SEX: F            ROOM/BED: D.2216    
AUTHOR: CHELE RAMON                             PHYSICIAN:                               
 
REFERRING PHYSICIAN: YOAN OLGUIN MD            
DATE OF SERVICE: 19
Discharge Plan
 
 
Patient Name: CHANTAL TIPTON
[facility]
Encounter #: [id number]
Medical Record #: [id number]
: 1931
Planned Disposition: 
Anticipated Discharge Date: 
 
Discharge Date: 
Expected LOS: 
Initial Reviewer: [email or web address]
Initial Review Date: 2019
Generated: 19   2:49 pm 
Comments
 
DCP- Discharge Planning
 
Updated by [email or web address]: Emilie Olvera on 19  12:47 pm CT
Patient no longer meets GIP and will be discharging to [place] [facility].   
CM will follow as needed
  
 
 
 
 
 
 
 
Patient Name: CHANTAL TIPTON
 
Encounter #: [id number]
Page [id number]
 
 
 
 
 
Electronically Signed by CHELE RAMON on 19 at 1349
 
 
 
 
 
 
**All edits/amendments must be made on the electronic document**
 
DICTATION DATE: 19 1349     : KARY  19 1349     
RPT#: [id number]                                DC DATE:        
                                               STATUS: ADM IN  
[facility]
 [place] [address]
***END OF REPORT***

## 2019-02-01 NOTE — EC
PATIENT:CHANTAL TIPTON                    DATE OF SERVICE: 01/24/19
SEX: F                                  MEDICAL RECORD: [id number]
DATE OF BIRTH: [birth year]                        LOCATION:[facility]
AGE OF PATIENT: 88                             ADMISSION DATE: 01/24/19
 
REFERRING PHYSICIAN:                               
 
INTERPRETING PHYSICIAN: BENNY LAUREANO MD             
 
 
 
                             ECHOCARDIOGRAM REPORT
  ECHO CHARGES 5               ECHO LIMITED                  Date: 01/25/19
 
 
 
CLINICAL DIAGNOSIS: PLEURAL EFFUSION,   HX OF     
                    PACER,HTN/AFIB,CVA,MVR([facility])  
                         ECHOCARDIOGRAPHIC MEASUREMENTS
      (adult normal given)
   AC root (d.<3.7cm)      cm   LV Septum d (<1.2 cm>      cm
      Valve Excursion      cm     LV Septum (systole)      cm
Left Atria (s.<4.0cm>      cm          LVPW d(<1.2cm)      cm
        RV (d.<2.3cm)      cm           LVPW (sytole)      cm
  LV diastole(<5.6CM)      cm       MV E-F(>70mm/sec)      cm
           LV systole      cm           LVOT Diameter      cm
       MV exc.(>10mm)      cm
Est.ejection fraction (50-75%)     %
 
   DOPPLER:
     LVIT      cm/sec A      cm/sec E       cm/sec
       LA      cm/sec      RVSP 56   mmHg
     LVOT      cm/sec   AOP1/2T      m/s
  Asc. Ao      cm/sec
     RVOT      cm/sec
       RA      cm/sec
       PA      cm/sec
 AV Gradient Peak      mmHg  AV Mean      mmHg  AV Area      cm
 MV Gradient Peak      mmHg  MV Mean      mmHg  MV Area      cm
   COMMENTS:                                              
 
 
 Cardiac Sonographer: Kandy CLOUD              
      Cardiologist: 1          Dr. Laureano                
             TAPE# PACS           
                                       Pericardial Effusion N                        
 
 
DATE OF SERVICE:  
 
FINDINGS:
1. Left ventricular chamber size is within normal limits.  Left ventricular
systolic function is normal.  Overall ejection fraction estimated at 55%.
2. Left atrium is enlarged at 5.1 cm.  Right atrium and right ventricle chamber
sizes are as well mildly dilated.
3. 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
with normal structure and function in this position.  The remaining valvular
structures have normal structure and motion.
 
 
 
ECHOCARDIOGRAM REPORT                          [id number]    CHANTAL TIPTON            
 
 
4. Doppler interrogation reveals only mild mitral regurgitation, severe
tricuspid regurgitation.  Pulmonary systolic pressure is elevated estimated at
56 mmHg.
5. A large pleural effusion is present, but no pericardial effusion is present. 
No evidence of left ventricular thrombus.
 
TRANSINT:[id number] Voice Confirmation ID: [id number] DOCUMENT ID: [id number]
                                           
                                           BENNY LAUREANO MD             
 
 
 
Electronically Signed by BENNY LAUREANO on 02/01/19 at 1211
 
 
 
 
 
 
 
 
 
 
 
 
 
 
 
 
 
 
 
 
 
 
 
 
 
 
 
 
 
 
 
 
 
CC:                                                             [id number]
DICTATION DATE: 01/25/19 1511     :     01/25/19 1958      DIS IN  
                                                                      01/25/19
[place] [id number] [place] [id number]

## 2019-02-04 NOTE — MORECARE
CASE MANAGEMENT DISCHARGE SUMMARY
 
 
PATIENT: CHANTAL TIPTON                        UNIT: [id number]
ACCOUNT#: [id number]                       ADM DATE: 19
AGE: 88     : 31  SEX: F            ROOM/BED: D.2216    
AUTHOR: CHELE RAMON                             PHYSICIAN:                               
 
REFERRING PHYSICIAN: YOAN OLGUIN MD            
DATE OF SERVICE: 19
Discharge Plan
 
 
Patient Name: CHANTAL TIPTON
[facility]
Encounter #: [id number]
Medical Record #: [id number]
: 1931
Planned Disposition: 
Anticipated Discharge Date: 
 
Discharge Date: 2019
Expected LOS: 0
Initial Reviewer: [id number]
Initial Review Date: 2019
Generated: 19  11:34 am 
Comments
 
DCP- Discharge Planning
 
Updated by [id number]: Emilie Olvera on 19   3:18 pm CT
SPOKE WITH LUCAS AT [place], THEY ARE ACCEPTING PATIENT TODAY AND HAVE 
EVERYTHING THEY WILL NEED. PATIENT WILL TRANSFER THERE VIA EMS
DCP- Discharge Planning
 
Updated by [id number]: Emilie Olvera on 19  12:47 pm CT
Patient no longer meets GIP and will be discharging to [place] [facility].   
CM will follow as needed
  
 
 
 
 
 
 
 
 
Last DP export: 19   3:18 p
Patient Name: CHANTAL TIPTON
 
Encounter #: [id number]
Page [phone]
 
 
 
 
 
Electronically Signed by CHELE RAMON on 19 at 1034
 
 
 
 
 
 
**All edits/amendments must be made on the electronic document**
 
DICTATION DATE: 19 1034     : KARY  19 1034     
RPT#: [id number]                                DC DATE:19
                                               STATUS: DIS IN  
[facility]
[address] [place] [address]
***END OF REPORT***

## 2019-03-31 ENCOUNTER — HOSPITAL ENCOUNTER (EMERGENCY)
Dept: HOSPITAL 84 - D.ER | Age: 84
Discharge: NURSING, REHAB OR LONG-TERM CARE | End: 2019-03-31
Payer: COMMERCIAL

## 2019-03-31 VITALS
HEIGHT: 65 IN | WEIGHT: 127.27 LBS | BODY MASS INDEX: 21.2 KG/M2 | BODY MASS INDEX: 21.2 KG/M2 | HEIGHT: 65 IN | WEIGHT: 127.27 LBS

## 2019-03-31 VITALS — DIASTOLIC BLOOD PRESSURE: 80 MMHG | SYSTOLIC BLOOD PRESSURE: 142 MMHG

## 2019-03-31 DIAGNOSIS — K59.00: ICD-10-CM

## 2019-03-31 DIAGNOSIS — K94.23: Primary | ICD-10-CM

## 2019-09-04 NOTE — NUR
PROVIDED PT WITH AM MEDICATIONS WITH A SIP OF WATER. PT IS NPO FOR PROCEDURE
TODAY AND VEBRALIZED UNDERSTANDING. CONSENTS OBTAINED AND PLACED IN CHART. PT
DENIES ANY CURRENT NEEDS. WILL CTM. no

## 2021-07-05 NOTE — NUR
RESTING IN BED WITH EYES CLOSED. HOB ELEVATED. N/G TUBE IN PLACE WITH OSMOLITE
1.O INFUSING AT 65CC/HR. NO S/S OF DISTRESS OBSERVED. 97.2

## 2023-10-26 NOTE — NUR
ALERT/AWAKE REQUESTED DINNER TRAY REMOVED. SHE DID NOT EAT ANY OF IT. INITIAL
ASSESSMENTS PER NSG FLOWCHART DONE. LEFT DOOR OPENED TO MONITOR CLOSELY. No